# Patient Record
Sex: MALE | Race: WHITE | Employment: OTHER | URBAN - METROPOLITAN AREA
[De-identification: names, ages, dates, MRNs, and addresses within clinical notes are randomized per-mention and may not be internally consistent; named-entity substitution may affect disease eponyms.]

---

## 2018-12-14 ENCOUNTER — OFFICE VISIT (OUTPATIENT)
Dept: FAMILY MEDICINE CLINIC | Age: 53
End: 2018-12-14
Payer: COMMERCIAL

## 2018-12-14 VITALS
OXYGEN SATURATION: 97 % | HEIGHT: 72 IN | SYSTOLIC BLOOD PRESSURE: 138 MMHG | TEMPERATURE: 98.1 F | WEIGHT: 212 LBS | HEART RATE: 88 BPM | BODY MASS INDEX: 28.71 KG/M2 | DIASTOLIC BLOOD PRESSURE: 88 MMHG

## 2018-12-14 DIAGNOSIS — E34.9 HYPOTESTOSTERONISM: ICD-10-CM

## 2018-12-14 DIAGNOSIS — F33.0 MILD EPISODE OF RECURRENT MAJOR DEPRESSIVE DISORDER (HCC): ICD-10-CM

## 2018-12-14 DIAGNOSIS — G47.00 INSOMNIA, UNSPECIFIED TYPE: ICD-10-CM

## 2018-12-14 DIAGNOSIS — I10 HYPERTENSION, UNSPECIFIED TYPE: Primary | ICD-10-CM

## 2018-12-14 DIAGNOSIS — F41.9 ANXIETY: ICD-10-CM

## 2018-12-14 DIAGNOSIS — G47.33 OSA (OBSTRUCTIVE SLEEP APNEA): ICD-10-CM

## 2018-12-14 PROCEDURE — 99205 OFFICE O/P NEW HI 60 MIN: CPT | Performed by: FAMILY MEDICINE

## 2018-12-14 RX ORDER — LISINOPRIL 30 MG/1
60 TABLET ORAL DAILY
COMMUNITY
End: 2018-12-14 | Stop reason: SDUPTHER

## 2018-12-14 RX ORDER — ANASTROZOLE 1 MG/1
1 TABLET ORAL WEEKLY
Refills: 0 | COMMUNITY
Start: 2018-11-15 | End: 2018-12-21

## 2018-12-14 RX ORDER — ZOLPIDEM TARTRATE 12.5 MG/1
12.5 TABLET, FILM COATED, EXTENDED RELEASE ORAL NIGHTLY PRN
Qty: 30 TABLET | Refills: 0 | Status: SHIPPED | OUTPATIENT
Start: 2018-12-14 | End: 2019-01-13

## 2018-12-14 RX ORDER — TESTOSTERONE GEL, 1% 10 MG/G
GEL TRANSDERMAL DAILY
COMMUNITY
End: 2019-02-15 | Stop reason: SDUPTHER

## 2018-12-14 RX ORDER — FENOFIBRATE 160 MG/1
160 TABLET ORAL DAILY
Status: ON HOLD | COMMUNITY
End: 2019-06-09 | Stop reason: ALTCHOICE

## 2018-12-14 RX ORDER — LISINOPRIL 40 MG/1
40 TABLET ORAL DAILY
Qty: 30 TABLET | Refills: 2 | Status: SHIPPED | OUTPATIENT
Start: 2018-12-14 | End: 2019-01-15 | Stop reason: SDUPTHER

## 2018-12-14 RX ORDER — HYDROCHLOROTHIAZIDE 25 MG/1
25 TABLET ORAL DAILY
Qty: 30 TABLET | Refills: 2 | Status: SHIPPED | OUTPATIENT
Start: 2018-12-14 | End: 2019-01-15 | Stop reason: SDUPTHER

## 2018-12-14 RX ORDER — ZOLPIDEM TARTRATE 10 MG/1
TABLET ORAL
Refills: 0 | COMMUNITY
Start: 2018-10-21 | End: 2018-12-14 | Stop reason: ALTCHOICE

## 2018-12-14 ASSESSMENT — PATIENT HEALTH QUESTIONNAIRE - PHQ9
2. FEELING DOWN, DEPRESSED OR HOPELESS: 2
SUM OF ALL RESPONSES TO PHQ9 QUESTIONS 1 & 2: 2
SUM OF ALL RESPONSES TO PHQ QUESTIONS 1-9: 2
1. LITTLE INTEREST OR PLEASURE IN DOING THINGS: 0
SUM OF ALL RESPONSES TO PHQ QUESTIONS 1-9: 2

## 2018-12-14 NOTE — PROGRESS NOTES
[facility]  Office Progress Note - Dr. Girish Carr  12/14/18    Chief Complaint   Patient presents with    New Patient        S: Establishing    Patient presents with many concerns today. He reports that he is generally felt unwell on his current medication regimens. He has been struggling with insomnia, anxiety and depression, low testosterone, and hypertension. Insomnia  Chronic problem. He has tried many different medications. He is currently taking Ambien 10 mg nightly, Serzone 100 mg twice a day, and Klonopin 1 mg once to twice daily. He has a history of sleep apnea test about 3 years ago. He has not been able to tolerate a CPAP machine, though he tried many different times. He finds that he has difficulty sleeping at home at nighttime, but notes that after his family leaves for the day, he usually is able to sleep for 3-4 hours. He finds himself waking frequently after initially falling to sleep with use of the medication. Obstructive sleep apnea  As above, but  Dx with sleep apnea about 3 years ago. Tried CPAP machine and just wasn't able to adjust to it. He did attempt to lose some weight. Was about 240 pounds, and now 212 today. Walking 2-3 miles 6 days weekly. Had irregular heartbeat - PVCs. Saw cardio. Had Cath.stress test, All good. He hasn't been retested since his weight loss. Hypertension  BP has not been well controlled. Often sees 160s-170s / 90s - 100s/. Worsens throughout the day   Has tried arbs, beta blocker and calcium channel blockers. Had sexual side effects from some meds. This is important to him to not have the side effects. He occasionally thinks he has anginal symptoms. He currently takes 60 mg of lisinopril daily. We discussed that this is an unusual dose. He has never had a workup for renal artery stenosis. Question if other causes of secondary hypertension have been worked up.   He is not able to tolerate treatment for obstructive

## 2018-12-21 ENCOUNTER — TELEPHONE (OUTPATIENT)
Dept: FAMILY MEDICINE CLINIC | Age: 53
End: 2018-12-21

## 2018-12-21 DIAGNOSIS — F41.9 ANXIETY: ICD-10-CM

## 2018-12-21 DIAGNOSIS — E34.9 HYPOTESTOSTERONISM: ICD-10-CM

## 2018-12-21 DIAGNOSIS — I10 HYPERTENSION, UNSPECIFIED TYPE: ICD-10-CM

## 2018-12-27 ENCOUNTER — HOSPITAL ENCOUNTER (OUTPATIENT)
Dept: ULTRASOUND IMAGING | Age: 53
Discharge: HOME | End: 2018-12-29
Payer: COMMERCIAL

## 2018-12-27 DIAGNOSIS — I10 HYPERTENSION, ESSENTIAL: ICD-10-CM

## 2018-12-27 DIAGNOSIS — I10 HYPERTENSION, UNSPECIFIED TYPE: ICD-10-CM

## 2018-12-27 PROCEDURE — 93975 VASCULAR STUDY: CPT

## 2018-12-27 PROCEDURE — 76770 US EXAM ABDO BACK WALL COMP: CPT

## 2019-01-15 ENCOUNTER — OFFICE VISIT (OUTPATIENT)
Dept: FAMILY MEDICINE CLINIC | Age: 54
End: 2019-01-15
Payer: COMMERCIAL

## 2019-01-15 VITALS
OXYGEN SATURATION: 97 % | SYSTOLIC BLOOD PRESSURE: 120 MMHG | DIASTOLIC BLOOD PRESSURE: 76 MMHG | TEMPERATURE: 98.3 F | WEIGHT: 215 LBS | HEIGHT: 72 IN | BODY MASS INDEX: 29.12 KG/M2 | HEART RATE: 99 BPM

## 2019-01-15 DIAGNOSIS — I10 HYPERTENSION, UNSPECIFIED TYPE: ICD-10-CM

## 2019-01-15 DIAGNOSIS — F32.A ANXIETY AND DEPRESSION: ICD-10-CM

## 2019-01-15 DIAGNOSIS — F41.9 ANXIETY AND DEPRESSION: ICD-10-CM

## 2019-01-15 DIAGNOSIS — F51.01 PRIMARY INSOMNIA: ICD-10-CM

## 2019-01-15 DIAGNOSIS — I10 ESSENTIAL HYPERTENSION: ICD-10-CM

## 2019-01-15 DIAGNOSIS — E34.9 HYPOTESTOSTERONEMIA: Primary | ICD-10-CM

## 2019-01-15 PROCEDURE — 99214 OFFICE O/P EST MOD 30 MIN: CPT | Performed by: FAMILY MEDICINE

## 2019-01-15 RX ORDER — LISINOPRIL 40 MG/1
40 TABLET ORAL DAILY
Qty: 90 TABLET | Refills: 1 | Status: SHIPPED | OUTPATIENT
Start: 2019-01-15 | End: 2019-10-22 | Stop reason: SDUPTHER

## 2019-01-15 RX ORDER — HYDROCHLOROTHIAZIDE 25 MG/1
25 TABLET ORAL DAILY
Qty: 90 TABLET | Refills: 1 | Status: SHIPPED | OUTPATIENT
Start: 2019-01-15 | End: 2019-05-21

## 2019-01-25 ENCOUNTER — NURSE ONLY (OUTPATIENT)
Dept: FAMILY MEDICINE CLINIC | Age: 54
End: 2019-01-25
Payer: COMMERCIAL

## 2019-01-25 ENCOUNTER — HOSPITAL ENCOUNTER (OUTPATIENT)
Age: 54
Discharge: HOME | End: 2019-01-27
Payer: COMMERCIAL

## 2019-01-25 DIAGNOSIS — E34.9 HYPOTESTOSTERONEMIA: Primary | ICD-10-CM

## 2019-01-25 DIAGNOSIS — I10 ESSENTIAL HYPERTENSION: ICD-10-CM

## 2019-01-25 DIAGNOSIS — E34.9 HYPOTESTOSTERONEMIA: ICD-10-CM

## 2019-01-25 LAB
ALBUMIN SERPL-MCNC: 4.5 G/DL (ref 3.5–5.2)
ALP BLD-CCNC: 37 U/L (ref 40–129)
ALT SERPL-CCNC: 19 U/L (ref 0–40)
ANION GAP SERPL CALCULATED.3IONS-SCNC: 13 MMOL/L (ref 7–16)
AST SERPL-CCNC: 14 U/L (ref 0–39)
BILIRUB SERPL-MCNC: 0.2 MG/DL (ref 0–1.2)
BUN BLDV-MCNC: 17 MG/DL (ref 6–20)
CALCIUM SERPL-MCNC: 9.1 MG/DL (ref 8.6–10.2)
CHLORIDE BLD-SCNC: 102 MMOL/L (ref 98–107)
CO2: 29 MMOL/L (ref 22–29)
CREAT SERPL-MCNC: 1 MG/DL (ref 0.7–1.2)
FOLLICLE STIMULATING HORMONE: <0.1 MIU/ML
GFR AFRICAN AMERICAN: >60
GFR NON-AFRICAN AMERICAN: >60 ML/MIN/1.73
GLUCOSE BLD-MCNC: 93 MG/DL (ref 74–99)
LUTEINIZING HORMONE: <0.1 MIU/ML
POTASSIUM SERPL-SCNC: 4.1 MMOL/L (ref 3.5–5)
SODIUM BLD-SCNC: 144 MMOL/L (ref 132–146)
TOTAL PROTEIN: 7.2 G/DL (ref 6.4–8.3)

## 2019-01-25 PROCEDURE — 83001 ASSAY OF GONADOTROPIN (FSH): CPT

## 2019-01-25 PROCEDURE — 80053 COMPREHEN METABOLIC PANEL: CPT

## 2019-01-25 PROCEDURE — 83002 ASSAY OF GONADOTROPIN (LH): CPT

## 2019-01-25 PROCEDURE — 36415 COLL VENOUS BLD VENIPUNCTURE: CPT | Performed by: FAMILY MEDICINE

## 2019-01-25 PROCEDURE — 84403 ASSAY OF TOTAL TESTOSTERONE: CPT

## 2019-01-25 PROCEDURE — 84270 ASSAY OF SEX HORMONE GLOBUL: CPT

## 2019-01-29 LAB
SEX HORMONE BINDING GLOBULIN: 19 NMOL/L (ref 11–80)
TESTOSTERONE FREE-NONMALE: 59.2 PG/ML (ref 47–244)
TESTOSTERONE TOTAL: 232 NG/DL (ref 220–1000)

## 2019-02-15 ENCOUNTER — OFFICE VISIT (OUTPATIENT)
Dept: FAMILY MEDICINE CLINIC | Age: 54
End: 2019-02-15
Payer: COMMERCIAL

## 2019-02-15 VITALS
SYSTOLIC BLOOD PRESSURE: 120 MMHG | BODY MASS INDEX: 29.57 KG/M2 | DIASTOLIC BLOOD PRESSURE: 80 MMHG | HEART RATE: 72 BPM | TEMPERATURE: 97.5 F | WEIGHT: 218 LBS | OXYGEN SATURATION: 97 %

## 2019-02-15 DIAGNOSIS — F41.9 ANXIETY: ICD-10-CM

## 2019-02-15 DIAGNOSIS — E34.9 HYPOTESTOSTERONISM: ICD-10-CM

## 2019-02-15 DIAGNOSIS — F33.1 MODERATE EPISODE OF RECURRENT MAJOR DEPRESSIVE DISORDER (HCC): ICD-10-CM

## 2019-02-15 DIAGNOSIS — F51.01 PRIMARY INSOMNIA: Primary | ICD-10-CM

## 2019-02-15 PROCEDURE — 99214 OFFICE O/P EST MOD 30 MIN: CPT | Performed by: FAMILY MEDICINE

## 2019-02-15 RX ORDER — TESTOSTERONE GEL, 1% 10 MG/G
GEL TRANSDERMAL
Qty: 120 TUBE | Refills: 0 | Status: SHIPPED | OUTPATIENT
Start: 2019-02-15 | End: 2019-04-15 | Stop reason: SDUPTHER

## 2019-02-15 ASSESSMENT — PATIENT HEALTH QUESTIONNAIRE - PHQ9
1. LITTLE INTEREST OR PLEASURE IN DOING THINGS: 0
SUM OF ALL RESPONSES TO PHQ QUESTIONS 1-9: 1
SUM OF ALL RESPONSES TO PHQ QUESTIONS 1-9: 1
SUM OF ALL RESPONSES TO PHQ9 QUESTIONS 1 & 2: 1
2. FEELING DOWN, DEPRESSED OR HOPELESS: 1

## 2019-02-22 ENCOUNTER — TELEPHONE (OUTPATIENT)
Dept: FAMILY MEDICINE CLINIC | Age: 54
End: 2019-02-22

## 2019-03-21 ENCOUNTER — OFFICE VISIT (OUTPATIENT)
Dept: ENDOCRINOLOGY | Age: 54
End: 2019-03-21
Payer: COMMERCIAL

## 2019-03-21 VITALS
WEIGHT: 219.8 LBS | BODY MASS INDEX: 29.77 KG/M2 | SYSTOLIC BLOOD PRESSURE: 122 MMHG | HEIGHT: 72 IN | OXYGEN SATURATION: 98 % | HEART RATE: 85 BPM | RESPIRATION RATE: 16 BRPM | DIASTOLIC BLOOD PRESSURE: 70 MMHG

## 2019-03-21 DIAGNOSIS — E66.3 OVERWEIGHT: ICD-10-CM

## 2019-03-21 DIAGNOSIS — E23.0 HYPOGONADOTROPIC HYPOGONADISM (HCC): Primary | ICD-10-CM

## 2019-03-21 DIAGNOSIS — G47.00 INSOMNIA, UNSPECIFIED TYPE: ICD-10-CM

## 2019-03-21 PROCEDURE — 99204 OFFICE O/P NEW MOD 45 MIN: CPT | Performed by: INTERNAL MEDICINE

## 2019-03-25 ENCOUNTER — TELEPHONE (OUTPATIENT)
Dept: FAMILY MEDICINE CLINIC | Age: 54
End: 2019-03-25

## 2019-03-25 ENCOUNTER — OFFICE VISIT (OUTPATIENT)
Dept: FAMILY MEDICINE CLINIC | Age: 54
End: 2019-03-25
Payer: COMMERCIAL

## 2019-03-25 VITALS
SYSTOLIC BLOOD PRESSURE: 118 MMHG | OXYGEN SATURATION: 94 % | TEMPERATURE: 97.7 F | HEART RATE: 78 BPM | DIASTOLIC BLOOD PRESSURE: 62 MMHG | BODY MASS INDEX: 29.7 KG/M2 | WEIGHT: 219 LBS

## 2019-03-25 DIAGNOSIS — F51.01 PRIMARY INSOMNIA: Primary | ICD-10-CM

## 2019-03-25 DIAGNOSIS — F33.1 MODERATE EPISODE OF RECURRENT MAJOR DEPRESSIVE DISORDER (HCC): ICD-10-CM

## 2019-03-25 PROCEDURE — 99213 OFFICE O/P EST LOW 20 MIN: CPT | Performed by: FAMILY MEDICINE

## 2019-03-25 RX ORDER — CLONAZEPAM 1 MG/1
1 TABLET ORAL NIGHTLY PRN
Qty: 30 TABLET | Refills: 0 | Status: SHIPPED | OUTPATIENT
Start: 2019-03-25 | End: 2019-03-25 | Stop reason: SDUPTHER

## 2019-03-25 RX ORDER — CLONAZEPAM 1 MG/1
1 TABLET ORAL NIGHTLY PRN
Qty: 30 TABLET | Refills: 0 | Status: SHIPPED | OUTPATIENT
Start: 2019-03-25 | End: 2019-04-19 | Stop reason: SDUPTHER

## 2019-03-25 RX ORDER — BUPROPION HYDROCHLORIDE 150 MG/1
150 TABLET ORAL EVERY MORNING
Qty: 30 TABLET | Refills: 1 | Status: SHIPPED | OUTPATIENT
Start: 2019-03-25 | End: 2019-05-13 | Stop reason: SDUPTHER

## 2019-04-08 DIAGNOSIS — F51.01 PRIMARY INSOMNIA: ICD-10-CM

## 2019-04-08 RX ORDER — CLONAZEPAM 1 MG/1
1 TABLET ORAL NIGHTLY PRN
Qty: 30 TABLET | Refills: 0 | OUTPATIENT
Start: 2019-04-08 | End: 2019-05-08

## 2019-04-15 DIAGNOSIS — E34.9 HYPOTESTOSTERONISM: ICD-10-CM

## 2019-04-15 RX ORDER — TESTOSTERONE GEL, 1% 10 MG/G
GEL TRANSDERMAL
Qty: 600 G | Refills: 2 | Status: SHIPPED | OUTPATIENT
Start: 2019-04-15 | End: 2019-10-22 | Stop reason: SDUPTHER

## 2019-04-19 DIAGNOSIS — F51.01 PRIMARY INSOMNIA: ICD-10-CM

## 2019-04-23 RX ORDER — CLONAZEPAM 1 MG/1
1 TABLET ORAL NIGHTLY PRN
Qty: 30 TABLET | Refills: 0 | Status: SHIPPED | OUTPATIENT
Start: 2019-04-23 | End: 2019-05-13 | Stop reason: ALTCHOICE

## 2019-05-13 ENCOUNTER — OFFICE VISIT (OUTPATIENT)
Dept: FAMILY MEDICINE CLINIC | Age: 54
End: 2019-05-13
Payer: COMMERCIAL

## 2019-05-13 ENCOUNTER — HOSPITAL ENCOUNTER (OUTPATIENT)
Age: 54
Discharge: HOME | End: 2019-05-15
Payer: COMMERCIAL

## 2019-05-13 VITALS
DIASTOLIC BLOOD PRESSURE: 98 MMHG | HEIGHT: 72 IN | BODY MASS INDEX: 28.71 KG/M2 | OXYGEN SATURATION: 97 % | HEART RATE: 74 BPM | SYSTOLIC BLOOD PRESSURE: 140 MMHG | WEIGHT: 212 LBS | TEMPERATURE: 98.5 F

## 2019-05-13 DIAGNOSIS — F33.1 MODERATE EPISODE OF RECURRENT MAJOR DEPRESSIVE DISORDER (HCC): ICD-10-CM

## 2019-05-13 DIAGNOSIS — R10.30 LOWER ABDOMINAL PAIN: ICD-10-CM

## 2019-05-13 DIAGNOSIS — F51.01 PRIMARY INSOMNIA: Primary | ICD-10-CM

## 2019-05-13 DIAGNOSIS — I10 ESSENTIAL HYPERTENSION: ICD-10-CM

## 2019-05-13 PROCEDURE — 83735 ASSAY OF MAGNESIUM: CPT

## 2019-05-13 PROCEDURE — 80053 COMPREHEN METABOLIC PANEL: CPT

## 2019-05-13 PROCEDURE — 99214 OFFICE O/P EST MOD 30 MIN: CPT | Performed by: FAMILY MEDICINE

## 2019-05-13 RX ORDER — BUPROPION HYDROCHLORIDE 150 MG/1
150 TABLET ORAL EVERY MORNING
Qty: 90 TABLET | Refills: 1 | Status: SHIPPED | OUTPATIENT
Start: 2019-05-13 | End: 2019-10-22 | Stop reason: SDUPTHER

## 2019-05-13 RX ORDER — CLONAZEPAM 0.5 MG/1
0.75 TABLET ORAL NIGHTLY PRN
Qty: 45 TABLET | Refills: 0 | Status: SHIPPED | OUTPATIENT
Start: 2019-05-13 | End: 2019-06-11

## 2019-05-13 NOTE — PROGRESS NOTES
headaches, +cramping  No tingling, No numbness, No weakness,   +bowel changes, No hematochezia, No melena,  No bladder changes, No hematuria  No skin rashes, No skin lesions. No vision changes, No hearing changes,   No polyuria, polydipsia, polyphagia. Improved mood. ROS otherwise negative unless as listed in HPI. Chart reviewed and updated where appropriate for PMH, Fam, and Soc Hx. Physical Exam   BP (!) 140/98 (Site: Right Upper Arm, Position: Sitting, Cuff Size: Large Adult)   Pulse 74   Temp 98.5 °F (36.9 °C) (Oral)   Ht 6' (1.829 m)   Wt 212 lb (96.2 kg)   SpO2 97%   BMI 28.75 kg/m²   Wt Readings from Last 3 Encounters:   05/13/19 212 lb (96.2 kg)   03/25/19 219 lb (99.3 kg)   03/21/19 219 lb 12.8 oz (99.7 kg)       Constitutional:    He is oriented to person, place, and time. He appears well-developed and well-nourished. HENT:    Nose: Nose normal.    Mouth/Throat: Oropharynx is clear and moist.   Eyes:    Conjunctivae are normal.    Pupils are equal, round, and reactive to light. EOMI. Neck:    Normal range of motion. No thyromegaly or nodules noted. No bruit. Cardiovascular:    Normal rate, regular rhythm and normal heart sounds. No murmur. No gallop and no friction rub. Pulmonary/Chest:    Effort normal and breath sounds normal.    No wheezes. No rales or rhonchi. Abdominal:    Soft. Bowel sounds are normal.    No distension. No tenderness. Musculoskeletal:    Normal range of motion. No joint swelling noted. No peripheral edema. Skin:    Skin is warm and dry. No rashes, lesions. Psychiatric:    He has a normal mood and affect. Normal groom and dress.     Current Outpatient Medications on File Prior to Visit   Medication Sig Dispense Refill    testosterone (ANDROGEL; TESTIM) 50 MG/5GM (1%) GEL 1% gel APPLY THE CONTENTS OF 2 TUBES ON MONDAY, WEDNESDAY, AND FRIDAY AND APPLY THE CONTENTS OF 1 TUBE ON THE OTHER DAYS OF THE WEEK 600 g 2    nefazodone (SERZONE) 100 MG tablet Take 2 tablets by mouth in the evening and one tablet by mouth in the morning. 270 tablet 0    lisinopril (PRINIVIL;ZESTRIL) 40 MG tablet Take 1 tablet by mouth daily 90 tablet 1    hydrochlorothiazide (HYDRODIURIL) 25 MG tablet Take 1 tablet by mouth daily 90 tablet 1    fenofibrate 160 MG tablet Take 160 mg by mouth daily       No current facility-administered medications on file prior to visit. Patient Active Problem List   Diagnosis Code    Moderate episode of recurrent major depressive disorder ([facility] [address] F33.1    Primary insomnia F51.01    Anxiety F41.9    Hypogonadotropic hypogonadism ([facility] [address] E23.0    Hypertriglyceridemia E78.1    HTN (hypertension) I10    ZIA (obstructive sleep apnea) G47.33    Overweight E66.3    Insomnia G47.00       Assessment / Ruiz Terry was seen today for depression. Diagnoses and all orders for this visit:    Primary insomnia, improving with mood improvement  -   Continue to wean:  clonazePAM (KLONOPIN) 0.5 MG tablet; Take 1.5 tablets by mouth nightly as needed (sleep) for up to 30 days. 0.5mg nightly next month. Moderate episode of recurrent major depressive disorder (HCC), improving  -  Continue   buPROPion (WELLBUTRIN XL) 150 MG extended release tablet; Take 1 tablet by mouth every morning    Essential hypertension, elevated today  -     Comprehensive Metabolic Panel; Future  -     MAGNESIUM; Future  Continue current BP med regimen. Intensify or alter next month if still elevated. He thinks potassium supplement seems to improve symptoms. K+ ok on 2 most recent checks. If wanted to try 10mEq dose and see if feel improvement, I wouldn't be opposed, but would want to recheck K+    Abdominal Pain / cramping  Due for colonoscopy follow up after obtaining and reviewing last Cscope report. Will refer. He relates the symptom to HCTZ use, will explore further with him. May try K+ daily prescribed. No red flag symptoms.      Return in about 1 month (around 6/13/2019). Patient counseled to follow up sooner or seek more acute care if symptoms worsening. Electronically signed by Delilah Elias MD on 5/14/2019    This note may have been created using dictation software.  Efforts were made to reduce grammatical or syntax errors, but some may persist.

## 2019-05-14 DIAGNOSIS — I10 ESSENTIAL HYPERTENSION: ICD-10-CM

## 2019-05-14 LAB
ALBUMIN SERPL-MCNC: 4.3 G/DL (ref 3.5–5.2)
ALP BLD-CCNC: 46 U/L (ref 40–129)
ALT SERPL-CCNC: 15 U/L (ref 0–40)
ANION GAP SERPL CALCULATED.3IONS-SCNC: 8 MMOL/L (ref 7–16)
AST SERPL-CCNC: 18 U/L (ref 0–39)
BILIRUB SERPL-MCNC: <0.2 MG/DL (ref 0–1.2)
BUN BLDV-MCNC: 13 MG/DL (ref 6–20)
CALCIUM SERPL-MCNC: 9.3 MG/DL (ref 8.6–10.2)
CHLORIDE BLD-SCNC: 101 MMOL/L (ref 98–107)
CO2: 30 MMOL/L (ref 22–29)
CREAT SERPL-MCNC: 0.8 MG/DL (ref 0.7–1.2)
GFR AFRICAN AMERICAN: >60
GFR NON-AFRICAN AMERICAN: >60 ML/MIN/1.73
GLUCOSE BLD-MCNC: 88 MG/DL (ref 74–99)
MAGNESIUM: 2.3 MG/DL (ref 1.6–2.6)
POTASSIUM SERPL-SCNC: 4.4 MMOL/L (ref 3.5–5)
SODIUM BLD-SCNC: 139 MMOL/L (ref 132–146)
TOTAL PROTEIN: 7.4 G/DL (ref 6.4–8.3)

## 2019-05-20 ENCOUNTER — TELEPHONE (OUTPATIENT)
Dept: FAMILY MEDICINE CLINIC | Age: 54
End: 2019-05-20

## 2019-05-20 NOTE — TELEPHONE ENCOUNTER
Spoke with pt today and he states he stopped taking his HCTZ and is no longer experiencing the angina and fatigue. He states he is feeling much better. His BP was 140/90. Please advise.

## 2019-05-21 DIAGNOSIS — F41.9 ANXIETY: ICD-10-CM

## 2019-05-21 RX ORDER — AMLODIPINE BESYLATE 2.5 MG/1
2.5 TABLET ORAL DAILY
Qty: 30 TABLET | Refills: 0 | Status: SHIPPED | OUTPATIENT
Start: 2019-05-21 | End: 2019-06-13 | Stop reason: SDUPTHER

## 2019-06-08 ENCOUNTER — APPOINTMENT (OUTPATIENT)
Dept: CT IMAGING | Age: 54
End: 2019-06-08
Payer: COMMERCIAL

## 2019-06-08 ENCOUNTER — HOSPITAL ENCOUNTER (EMERGENCY)
Age: 54
Discharge: NURSING, REHAB OR LONG-TERM CARE | End: 2019-06-08
Attending: EMERGENCY MEDICINE
Payer: COMMERCIAL

## 2019-06-08 ENCOUNTER — HOSPITAL ENCOUNTER (OUTPATIENT)
Age: 54
Discharge: HOME | End: 2019-06-08
Payer: COMMERCIAL

## 2019-06-08 ENCOUNTER — HOSPITAL ENCOUNTER (OUTPATIENT)
Age: 54
Setting detail: OBSERVATION
Discharge: LEFT BEFORE DISCHARGE | End: 2019-06-09
Attending: INTERNAL MEDICINE | Admitting: INTERNAL MEDICINE
Payer: COMMERCIAL

## 2019-06-08 VITALS
WEIGHT: 215 LBS | HEART RATE: 70 BPM | RESPIRATION RATE: 16 BRPM | TEMPERATURE: 98.4 F | OXYGEN SATURATION: 96 % | BODY MASS INDEX: 29.12 KG/M2 | SYSTOLIC BLOOD PRESSURE: 158 MMHG | HEIGHT: 72 IN | DIASTOLIC BLOOD PRESSURE: 100 MMHG

## 2019-06-08 DIAGNOSIS — R10.9 ABDOMINAL PAIN, UNSPECIFIED ABDOMINAL LOCATION: ICD-10-CM

## 2019-06-08 DIAGNOSIS — G45.9 TIA (TRANSIENT ISCHEMIC ATTACK): Primary | ICD-10-CM

## 2019-06-08 LAB
ALBUMIN SERPL-MCNC: 4.4 G/DL (ref 3.5–5.2)
ALP BLD-CCNC: 47 U/L (ref 40–129)
ALT SERPL-CCNC: 17 U/L (ref 0–40)
ANION GAP SERPL CALCULATED.3IONS-SCNC: 14 MMOL/L (ref 7–16)
AST SERPL-CCNC: 14 U/L (ref 0–39)
BASOPHILS ABSOLUTE: 0.08 E9/L (ref 0–0.2)
BASOPHILS RELATIVE PERCENT: 1.1 % (ref 0–2)
BILIRUB SERPL-MCNC: 0.5 MG/DL (ref 0–1.2)
BILIRUBIN URINE: NEGATIVE
BLOOD, URINE: NEGATIVE
BUN BLDV-MCNC: 17 MG/DL (ref 6–20)
CALCIUM SERPL-MCNC: 10.1 MG/DL (ref 8.6–10.2)
CHLORIDE BLD-SCNC: 99 MMOL/L (ref 98–107)
CLARITY: CLEAR
CO2: 24 MMOL/L (ref 22–29)
COLOR: YELLOW
CREAT SERPL-MCNC: 0.8 MG/DL (ref 0.7–1.2)
EOSINOPHILS ABSOLUTE: 0.22 E9/L (ref 0.05–0.5)
EOSINOPHILS RELATIVE PERCENT: 3 % (ref 0–6)
GFR AFRICAN AMERICAN: >60
GFR NON-AFRICAN AMERICAN: >60 ML/MIN/1.73
GLUCOSE BLD-MCNC: 104 MG/DL (ref 74–99)
GLUCOSE URINE: NEGATIVE MG/DL
HCT VFR BLD CALC: 46.8 % (ref 37–54)
HEMOGLOBIN: 15.7 G/DL (ref 12.5–16.5)
IMMATURE GRANULOCYTES #: 0.02 E9/L
IMMATURE GRANULOCYTES %: 0.3 % (ref 0–5)
KETONES, URINE: ABNORMAL MG/DL
LACTIC ACID: 1.2 MMOL/L (ref 0.5–2.2)
LEUKOCYTE ESTERASE, URINE: NEGATIVE
LIPASE: 28 U/L (ref 13–60)
LYMPHOCYTES ABSOLUTE: 2.03 E9/L (ref 1.5–4)
LYMPHOCYTES RELATIVE PERCENT: 27.2 % (ref 20–42)
MCH RBC QN AUTO: 31 PG (ref 26–35)
MCHC RBC AUTO-ENTMCNC: 33.5 % (ref 32–34.5)
MCV RBC AUTO: 92.5 FL (ref 80–99.9)
MONOCYTES ABSOLUTE: 0.57 E9/L (ref 0.1–0.95)
MONOCYTES RELATIVE PERCENT: 7.7 % (ref 2–12)
NEUTROPHILS ABSOLUTE: 4.53 E9/L (ref 1.8–7.3)
NEUTROPHILS RELATIVE PERCENT: 60.7 % (ref 43–80)
NITRITE, URINE: NEGATIVE
PDW BLD-RTO: 13.3 FL (ref 11.5–15)
PH UA: 8.5 (ref 5–9)
PLATELET # BLD: 244 E9/L (ref 130–450)
PMV BLD AUTO: 9.7 FL (ref 7–12)
POTASSIUM SERPL-SCNC: 3.6 MMOL/L (ref 3.5–5)
PROTEIN UA: NEGATIVE MG/DL
RBC # BLD: 5.06 E12/L (ref 3.8–5.8)
SODIUM BLD-SCNC: 137 MMOL/L (ref 132–146)
SPECIFIC GRAVITY UA: 1.02 (ref 1–1.03)
TOTAL PROTEIN: 7.1 G/DL (ref 6.4–8.3)
TROPONIN: <0.01 NG/ML (ref 0–0.03)
TROPONIN: <0.01 NG/ML (ref 0–0.03)
UROBILINOGEN, URINE: 0.2 E.U./DL
WBC # BLD: 7.5 E9/L (ref 4.5–11.5)

## 2019-06-08 PROCEDURE — 84484 ASSAY OF TROPONIN QUANT: CPT

## 2019-06-08 PROCEDURE — 2580000003 HC RX 258: Performed by: INTERNAL MEDICINE

## 2019-06-08 PROCEDURE — 6360000002 HC RX W HCPCS: Performed by: STUDENT IN AN ORGANIZED HEALTH CARE EDUCATION/TRAINING PROGRAM

## 2019-06-08 PROCEDURE — A0425 GROUND MILEAGE: HCPCS

## 2019-06-08 PROCEDURE — 36415 COLL VENOUS BLD VENIPUNCTURE: CPT

## 2019-06-08 PROCEDURE — 99285 EMERGENCY DEPT VISIT HI MDM: CPT

## 2019-06-08 PROCEDURE — G0378 HOSPITAL OBSERVATION PER HR: HCPCS

## 2019-06-08 PROCEDURE — 70450 CT HEAD/BRAIN W/O DYE: CPT

## 2019-06-08 PROCEDURE — 2580000003 HC RX 258: Performed by: STUDENT IN AN ORGANIZED HEALTH CARE EDUCATION/TRAINING PROGRAM

## 2019-06-08 PROCEDURE — 83690 ASSAY OF LIPASE: CPT

## 2019-06-08 PROCEDURE — 96374 THER/PROPH/DIAG INJ IV PUSH: CPT

## 2019-06-08 PROCEDURE — 6370000000 HC RX 637 (ALT 250 FOR IP): Performed by: STUDENT IN AN ORGANIZED HEALTH CARE EDUCATION/TRAINING PROGRAM

## 2019-06-08 PROCEDURE — 96375 TX/PRO/DX INJ NEW DRUG ADDON: CPT

## 2019-06-08 PROCEDURE — A0426 ALS 1: HCPCS

## 2019-06-08 PROCEDURE — 85025 COMPLETE CBC W/AUTO DIFF WBC: CPT

## 2019-06-08 PROCEDURE — 81003 URINALYSIS AUTO W/O SCOPE: CPT

## 2019-06-08 PROCEDURE — 6370000000 HC RX 637 (ALT 250 FOR IP): Performed by: INTERNAL MEDICINE

## 2019-06-08 PROCEDURE — 83605 ASSAY OF LACTIC ACID: CPT

## 2019-06-08 PROCEDURE — 6360000004 HC RX CONTRAST MEDICATION: Performed by: RADIOLOGY

## 2019-06-08 PROCEDURE — 74177 CT ABD & PELVIS W/CONTRAST: CPT

## 2019-06-08 PROCEDURE — 80053 COMPREHEN METABOLIC PANEL: CPT

## 2019-06-08 PROCEDURE — 93005 ELECTROCARDIOGRAM TRACING: CPT | Performed by: EMERGENCY MEDICINE

## 2019-06-08 PROCEDURE — 2500000003 HC RX 250 WO HCPCS: Performed by: STUDENT IN AN ORGANIZED HEALTH CARE EDUCATION/TRAINING PROGRAM

## 2019-06-08 RX ORDER — ATORVASTATIN CALCIUM 40 MG/1
40 TABLET, FILM COATED ORAL NIGHTLY
Status: DISCONTINUED | OUTPATIENT
Start: 2019-06-08 | End: 2019-06-09 | Stop reason: HOSPADM

## 2019-06-08 RX ORDER — ASPIRIN 81 MG/1
81 TABLET ORAL DAILY
Status: DISCONTINUED | OUTPATIENT
Start: 2019-06-09 | End: 2019-06-09 | Stop reason: HOSPADM

## 2019-06-08 RX ORDER — 0.9 % SODIUM CHLORIDE 0.9 %
1000 INTRAVENOUS SOLUTION INTRAVENOUS ONCE
Status: COMPLETED | OUTPATIENT
Start: 2019-06-08 | End: 2019-06-08

## 2019-06-08 RX ORDER — SODIUM CHLORIDE 0.9 % (FLUSH) 0.9 %
10 SYRINGE (ML) INJECTION EVERY 12 HOURS SCHEDULED
Status: DISCONTINUED | OUTPATIENT
Start: 2019-06-08 | End: 2019-06-09 | Stop reason: HOSPADM

## 2019-06-08 RX ORDER — SODIUM CHLORIDE 0.9 % (FLUSH) 0.9 %
10 SYRINGE (ML) INJECTION PRN
Status: DISCONTINUED | OUTPATIENT
Start: 2019-06-08 | End: 2019-06-09 | Stop reason: HOSPADM

## 2019-06-08 RX ORDER — PROCHLORPERAZINE EDISYLATE 5 MG/ML
10 INJECTION INTRAMUSCULAR; INTRAVENOUS ONCE
Status: COMPLETED | OUTPATIENT
Start: 2019-06-08 | End: 2019-06-08

## 2019-06-08 RX ORDER — ONDANSETRON 2 MG/ML
4 INJECTION INTRAMUSCULAR; INTRAVENOUS EVERY 6 HOURS PRN
Status: DISCONTINUED | OUTPATIENT
Start: 2019-06-08 | End: 2019-06-09 | Stop reason: HOSPADM

## 2019-06-08 RX ADMIN — ATORVASTATIN CALCIUM 40 MG: 40 TABLET, FILM COATED ORAL at 23:31

## 2019-06-08 RX ADMIN — SODIUM CHLORIDE 1000 ML: 9 INJECTION, SOLUTION INTRAVENOUS at 17:36

## 2019-06-08 RX ADMIN — Medication 10 ML: at 23:31

## 2019-06-08 RX ADMIN — FAMOTIDINE 20 MG: 10 INJECTION, SOLUTION INTRAVENOUS at 18:57

## 2019-06-08 RX ADMIN — LIDOCAINE HYDROCHLORIDE: 20 SOLUTION ORAL; TOPICAL at 18:57

## 2019-06-08 RX ADMIN — PROCHLORPERAZINE EDISYLATE 10 MG: 5 INJECTION INTRAMUSCULAR; INTRAVENOUS at 17:36

## 2019-06-08 RX ADMIN — IOPAMIDOL 110 ML: 755 INJECTION, SOLUTION INTRAVENOUS at 18:24

## 2019-06-08 ASSESSMENT — ENCOUNTER SYMPTOMS
SHORTNESS OF BREATH: 0
BACK PAIN: 0
WHEEZING: 0
VOMITING: 0
COUGH: 0
ABDOMINAL PAIN: 1
DIARRHEA: 0
CONSTIPATION: 0
NAUSEA: 1
RHINORRHEA: 0
BLOOD IN STOOL: 0
CHEST TIGHTNESS: 0
SORE THROAT: 0

## 2019-06-08 ASSESSMENT — PAIN DESCRIPTION - DESCRIPTORS: DESCRIPTORS: ACHING

## 2019-06-08 ASSESSMENT — PAIN SCALES - GENERAL
PAINLEVEL_OUTOF10: 0
PAINLEVEL_OUTOF10: 4

## 2019-06-08 ASSESSMENT — PAIN DESCRIPTION - ORIENTATION: ORIENTATION: UPPER

## 2019-06-08 ASSESSMENT — PAIN DESCRIPTION - PROGRESSION: CLINICAL_PROGRESSION: GRADUALLY WORSENING

## 2019-06-08 ASSESSMENT — PAIN DESCRIPTION - PAIN TYPE: TYPE: ACUTE PAIN

## 2019-06-08 ASSESSMENT — PAIN DESCRIPTION - LOCATION: LOCATION: ABDOMEN

## 2019-06-08 ASSESSMENT — PAIN DESCRIPTION - ONSET: ONSET: GRADUAL

## 2019-06-08 ASSESSMENT — PAIN DESCRIPTION - FREQUENCY: FREQUENCY: CONTINUOUS

## 2019-06-08 NOTE — ED PROVIDER NOTES
Patient is 82-year-old male with past medical history of hypertension who presents emergency department for abdominal pain. Patient states that he's had this abdominal pain for a few months. Stated that it makes it difficult for him to feel comfortable eating. States that the pain starts in the epigastric region and then will move up into his mid chest. States that earlier today  to him feeling like he was given have near-syncope event. States he did not pass out or hit his head at all. Just felt very weak all of a sudden. Patient also states that he was having some slurred speech at that time. Presentation emergency department he has 90 symptoms apart from the abdominal pain. Denies any nausea/vomiting, fevers, chest pain, shortness of breath. States that he has seen his primary care physician for this concern and they said lets follow-up in a few weeks and checked again if you're still having it. He is taking no medications to address his abdominal pain. Denies any hematemesis. Also denies any dark stool. Review of Systems   Constitutional: Negative for diaphoresis and fever. HENT: Negative for congestion, rhinorrhea and sore throat. Eyes: Negative for visual disturbance. Respiratory: Negative for cough, chest tightness, shortness of breath and wheezing. Cardiovascular: Negative for chest pain, palpitations and leg swelling. Gastrointestinal: Positive for abdominal pain and nausea. Negative for blood in stool, constipation, diarrhea and vomiting. Endocrine: Negative for polyuria. Genitourinary: Negative for decreased urine volume, discharge and dysuria. Musculoskeletal: Negative for back pain, neck pain and neck stiffness. Skin: Negative for rash. Neurological: Positive for light-headedness. Negative for syncope, weakness and headaches. Hematological: Negative for adenopathy. Psychiatric/Behavioral: Negative for confusion.        Physical Exam   Constitutional: He is limb holds 90 (or 45) degrees for full 10 seconds   5B: Test Right Arm Motor Drift 0 - no drift, limb holds 90 (or 45) degrees for full 10 seconds   6A: Test Left Leg Motor Drift 0 - no drift; leg holds 30 degree position for full 5 seconds   6B: Test Right Leg Motor Drift 0 - no drift; leg holds 30 degree position for full 5 seconds   7: Test Limb Ataxia   (FNF/Heel-Shin) 0 - absent   8: Test Sensation 0 - normal; no sensory loss   9: Test Language/Aphasia 0 - no aphasia, normal   10: Test Dysarthria 0 - normal   11: Test Extinction/Inattention 0 - no abnormality   Total NIH Stroke Score: 0     tPA Criteria*  Inclusion criteria:  - Ischemic stroke onset within 3 hours of drug administration  - Age 25 or older  - No hemorrhage or non-stroke cause of deficit on CT  - Measurable deficit on NIH Stroke Scale    Exclusion criteria: If the patient. ...  - has minor or improving symptoms  - had seizure at onset of stroke  - has had another stroke or serious head trauma within the last 3 months  - has had major surgery within the last 14 days  - has known history of intracranial hemorrhage  - has sustained systolic blood pressure >608 mmHg  - has sustained diastolic blood pressure >430 mmHg  - requires aggressive treatment is necessary to lower their blood pressure  - has symptoms suggestive of subarachnoid hemorrhage  - has had GI or urinary tract hemorrhage within the last 21 days  - has had an arterial puncture at a non-compressible site within the last 7 days  - received heparin within the last 48 hours and has an elevated PTT  - has a prothrombin time (PT) >15 seconds  - has a platelet count <153,492 uL  - serum blood glucose is <50 mg/dL or >400 mg/dL    Relative Contraindications:  - NIH Stroke score >22  - Patient's CT shows evidence of large MCA territory infarction (>1/3 the MCA territory)    *[facility] Policy Paper      Acute CVA Core Measures:       --------------------------------------------- PAST HISTORY ---------------------------------------------  Past Medical History:  has a past medical history of Anxiety, Depression, HTN (hypertension), Hypertriglyceridemia, Hypogonadotropic hypogonadism ([address] Insomnia, ZIA (obstructive sleep apnea), and Overweight. Past Surgical History:  has a past surgical history that includes Colonoscopy (04/05/2012) and Foot surgery. Social History:  reports that he quit smoking about 29 years ago. His smoking use included cigarettes. He has a 8.00 pack-year smoking history. He has never used smokeless tobacco. He reports that he drinks alcohol. He reports that he does not use drugs. Family History: family history includes ADHD in his daughter; Alcohol Abuse in his father; Depression in his daughter; No Known Problems in his mother; Other in his father. The patients home medications have been reviewed.     Allergies: Codeine    -------------------------------------------------- RESULTS -------------------------------------------------    Lab  Results for orders placed or performed during the hospital encounter of 06/08/19   CBC Auto Differential   Result Value Ref Range    WBC 7.5 4.5 - 11.5 E9/L    RBC 5.06 3.80 - 5.80 E12/L    Hemoglobin 15.7 12.5 - 16.5 g/dL    Hematocrit 46.8 37.0 - 54.0 %    MCV 92.5 80.0 - 99.9 fL    MCH 31.0 26.0 - 35.0 pg    MCHC 33.5 32.0 - 34.5 %    RDW 13.3 11.5 - 15.0 fL    Platelets 313 008 - 592 E9/L    MPV 9.7 7.0 - 12.0 fL    Neutrophils % 60.7 43.0 - 80.0 %    Immature Granulocytes % 0.3 0.0 - 5.0 %    Lymphocytes % 27.2 20.0 - 42.0 %    Monocytes % 7.7 2.0 - 12.0 %    Eosinophils % 3.0 0.0 - 6.0 %    Basophils % 1.1 0.0 - 2.0 %    Neutrophils # 4.53 1.80 - 7.30 E9/L    Immature Granulocytes # 0.02 E9/L    Lymphocytes # 2.03 1.50 - 4.00 E9/L    Monocytes # 0.57 0.10 - 0.95 E9/L    Eosinophils # 0.22 0.05 - 0.50 E9/L    Basophils # 0.08 0.00 - 0.20 E9/L   Comprehensive Metabolic Panel   Result Value Ref Range    Sodium 137 132 - 146 mmol/L Potassium 3.6 3.5 - 5.0 mmol/L    Chloride 99 98 - 107 mmol/L    CO2 24 22 - 29 mmol/L    Anion Gap 14 7 - 16 mmol/L    Glucose 104 (H) 74 - 99 mg/dL    BUN 17 6 - 20 mg/dL    CREATININE 0.8 0.7 - 1.2 mg/dL    GFR Non-African American >60 >=60 mL/min/1.73    GFR African American >60     Calcium 10.1 8.6 - 10.2 mg/dL    Total Protein 7.1 6.4 - 8.3 g/dL    Alb 4.4 3.5 - 5.2 g/dL    Total Bilirubin 0.5 0.0 - 1.2 mg/dL    Alkaline Phosphatase 47 40 - 129 U/L    ALT 17 0 - 40 U/L    AST 14 0 - 39 U/L   Lactic Acid, Plasma   Result Value Ref Range    Lactic Acid 1.2 0.5 - 2.2 mmol/L   Lipase   Result Value Ref Range    Lipase 28 13 - 60 U/L   Urinalysis   Result Value Ref Range    Color, UA Yellow Straw/Yellow    Clarity, UA Clear Clear    Glucose, Ur Negative Negative mg/dL    Bilirubin Urine Negative Negative    Ketones, Urine TRACE (A) Negative mg/dL    Specific Gravity, UA 1.020 1.005 - 1.030    Blood, Urine Negative Negative    pH, UA 8.5 5.0 - 9.0    Protein, UA Negative Negative mg/dL    Urobilinogen, Urine 0.2 <2.0 E.U./dL    Nitrite, Urine Negative Negative    Leukocyte Esterase, Urine Negative Negative   Troponin   Result Value Ref Range    Troponin <0.01 0.00 - 0.03 ng/mL       Radiology  CT Head WO Contrast   Final Result      1. This study done after a CT scan abdomen and pelvis IV contrast   which is demonstrate normal expected enhancement of major intracranial   arteries and cause some limitations in the evaluation of the more   discrete intracranial hemorrhagic events as above discussed. 2. Cannot see well the left middle cerebral artery M1 segment when   compared with the right middle cerebral artery M1 segment which is   better visualized. This is not a definite finding, as the present   examination is not a CTA of the brain. 3.  In presence of a referred clinical history of stroke, can consider   correlation with the CTA of the head/CT Perfusion, alternative would   be MRI with diffusion images/MRA of the brain. ALERT:  ABNORMAL REPORT. CT ABDOMEN PELVIS W IV CONTRAST Additional Contrast? None   Final Result      1. No acute intra-abdominal findings. 2. Enlarged prostate.                           ------------------------- NURSING NOTES AND VITALS REVIEWED ---------------------------  Date / Time Roomed:  6/8/2019  4:56 PM  ED Bed Assignment:  23/23    The nursing notes within the ED encounter and vital signs as below have been reviewed. Patient Vitals for the past 24 hrs:   BP Temp Temp src Pulse Resp SpO2 Height Weight   06/08/19 1700 (!) 157/94 98.4 °F (36.9 °C) Oral 64 16 95 % 6' (1.829 m) 215 lb (97.5 kg)       Oxygen Saturation Interpretation: Normal      ------------------------------------------ PROGRESS NOTES ------------------------------------------  I have spoken with the patient and discussed todays results, in addition to providing specific details for the plan of care and counseling regarding the diagnosis and prognosis. Their questions are answered at this time and they are agreeable with the plan. I have discussed the risks and benefits of transfer and they wish to proceed with the transfer. --------------------------------- ADDITIONAL PROVIDER NOTES ---------------------------------  Consultations:  Spoke with Dr. Lucero Mathews (Medicine). Discussed case. They will accept transfer patient to [place] [address].    Reason for transfer: Neurology not available this facility. This patient's ED course included: a personal history and physicial examination, re-evaluation prior to disposition, multiple bedside re-evaluations, cardiac monitoring, continuous pulse oximetry and complex medical decision making and emergency management     Discussed imaging and laboratory results the patient. Patient's CT head was negative for acute pathology. Abdomen was also negative for acute pathology.  Due to patient's complaint of slurred speech, consideration for TIA

## 2019-06-09 ENCOUNTER — APPOINTMENT (OUTPATIENT)
Dept: MRI IMAGING | Age: 54
End: 2019-06-09
Attending: INTERNAL MEDICINE
Payer: COMMERCIAL

## 2019-06-09 VITALS
HEART RATE: 87 BPM | SYSTOLIC BLOOD PRESSURE: 128 MMHG | TEMPERATURE: 98 F | RESPIRATION RATE: 16 BRPM | DIASTOLIC BLOOD PRESSURE: 82 MMHG | OXYGEN SATURATION: 96 %

## 2019-06-09 LAB
CHOLESTEROL, TOTAL: 179 MG/DL (ref 0–199)
EKG ATRIAL RATE: 75 BPM
EKG P AXIS: 23 DEGREES
EKG P-R INTERVAL: 188 MS
EKG Q-T INTERVAL: 390 MS
EKG QRS DURATION: 102 MS
EKG QTC CALCULATION (BAZETT): 435 MS
EKG R AXIS: 13 DEGREES
EKG T AXIS: 24 DEGREES
EKG VENTRICULAR RATE: 75 BPM
HDLC SERPL-MCNC: 49 MG/DL
LDL CHOLESTEROL CALCULATED: 102 MG/DL (ref 0–99)
LV EF: 65 %
LVEF MODALITY: NORMAL
TRIGL SERPL-MCNC: 139 MG/DL (ref 0–149)
TROPONIN: <0.01 NG/ML (ref 0–0.03)
VLDLC SERPL CALC-MCNC: 28 MG/DL

## 2019-06-09 PROCEDURE — 70551 MRI BRAIN STEM W/O DYE: CPT

## 2019-06-09 PROCEDURE — 36415 COLL VENOUS BLD VENIPUNCTURE: CPT

## 2019-06-09 PROCEDURE — G0378 HOSPITAL OBSERVATION PER HR: HCPCS

## 2019-06-09 PROCEDURE — 93010 ELECTROCARDIOGRAM REPORT: CPT | Performed by: INTERNAL MEDICINE

## 2019-06-09 PROCEDURE — 99253 IP/OBS CNSLTJ NEW/EST LOW 45: CPT | Performed by: PSYCHIATRY & NEUROLOGY

## 2019-06-09 PROCEDURE — 2580000003 HC RX 258: Performed by: INTERNAL MEDICINE

## 2019-06-09 PROCEDURE — 84484 ASSAY OF TROPONIN QUANT: CPT

## 2019-06-09 PROCEDURE — 80061 LIPID PANEL: CPT

## 2019-06-09 PROCEDURE — 6360000002 HC RX W HCPCS: Performed by: INTERNAL MEDICINE

## 2019-06-09 PROCEDURE — 6370000000 HC RX 637 (ALT 250 FOR IP): Performed by: INTERNAL MEDICINE

## 2019-06-09 PROCEDURE — 93306 TTE W/DOPPLER COMPLETE: CPT

## 2019-06-09 PROCEDURE — 97165 OT EVAL LOW COMPLEX 30 MIN: CPT

## 2019-06-09 RX ORDER — CLONAZEPAM 0.5 MG/1
0.75 TABLET ORAL NIGHTLY PRN
Status: DISCONTINUED | OUTPATIENT
Start: 2019-06-09 | End: 2019-06-09 | Stop reason: HOSPADM

## 2019-06-09 RX ORDER — FENOFIBRATE 54 MG/1
54 TABLET ORAL DAILY
Status: DISCONTINUED | OUTPATIENT
Start: 2019-06-09 | End: 2019-06-09 | Stop reason: HOSPADM

## 2019-06-09 RX ORDER — ASPIRIN 81 MG/1
81 TABLET ORAL DAILY
Qty: 30 TABLET | Refills: 3 | Status: SHIPPED | OUTPATIENT
Start: 2019-06-09

## 2019-06-09 RX ORDER — LISINOPRIL 20 MG/1
40 TABLET ORAL DAILY
Status: DISCONTINUED | OUTPATIENT
Start: 2019-06-09 | End: 2019-06-09 | Stop reason: CLARIF

## 2019-06-09 RX ORDER — AMLODIPINE BESYLATE 2.5 MG/1
2.5 TABLET ORAL DAILY
Status: DISCONTINUED | OUTPATIENT
Start: 2019-06-09 | End: 2019-06-09 | Stop reason: HOSPADM

## 2019-06-09 RX ORDER — NEFAZODONE HYDROCHLORIDE 50 MG/1
100 TABLET ORAL 2 TIMES DAILY
Status: DISCONTINUED | OUTPATIENT
Start: 2019-06-09 | End: 2019-06-09 | Stop reason: DRUGHIGH

## 2019-06-09 RX ORDER — ATORVASTATIN CALCIUM 40 MG/1
40 TABLET, FILM COATED ORAL NIGHTLY
Qty: 30 TABLET | Refills: 3 | Status: SHIPPED | OUTPATIENT
Start: 2019-06-09 | End: 2019-10-22 | Stop reason: SDUPTHER

## 2019-06-09 RX ORDER — BUPROPION HYDROCHLORIDE 150 MG/1
150 TABLET ORAL EVERY MORNING
Status: DISCONTINUED | OUTPATIENT
Start: 2019-06-09 | End: 2019-06-09 | Stop reason: HOSPADM

## 2019-06-09 RX ORDER — LISINOPRIL 40 MG/1
40 TABLET ORAL DAILY
Status: DISCONTINUED | OUTPATIENT
Start: 2019-06-09 | End: 2019-06-09 | Stop reason: HOSPADM

## 2019-06-09 RX ORDER — NEFAZODONE HYDROCHLORIDE 50 MG/1
100 TABLET ORAL DAILY
Status: DISCONTINUED | OUTPATIENT
Start: 2019-06-09 | End: 2019-06-09 | Stop reason: SDUPTHER

## 2019-06-09 RX ORDER — NEFAZODONE HYDROCHLORIDE 50 MG/1
200 TABLET ORAL NIGHTLY
Status: DISCONTINUED | OUTPATIENT
Start: 2019-06-09 | End: 2019-06-09 | Stop reason: SDUPTHER

## 2019-06-09 RX ADMIN — BUPROPION HYDROCHLORIDE 150 MG: 150 TABLET ORAL at 09:17

## 2019-06-09 RX ADMIN — LISINOPRIL 40 MG: 40 TABLET ORAL at 09:17

## 2019-06-09 RX ADMIN — ASPIRIN 81 MG: 81 TABLET ORAL at 09:17

## 2019-06-09 RX ADMIN — Medication 10 ML: at 09:17

## 2019-06-09 RX ADMIN — Medication 100 MG: at 12:02

## 2019-06-09 RX ADMIN — AMLODIPINE BESYLATE 2.5 MG: 2.5 TABLET ORAL at 09:17

## 2019-06-09 ASSESSMENT — PAIN SCALES - GENERAL: PAINLEVEL_OUTOF10: 0

## 2019-06-09 NOTE — PROGRESS NOTES
[facility] called with a bed assignment at this time patient going to [id number]  27 main room 3940 call Nurse to Nurse to [phone]  [address] 30 min. Approx.  [phone]

## 2019-06-09 NOTE — PROGRESS NOTES
Dr. Stephanie Burrows notified that patient is wanting to leave against medical advice and not waiting for Echo to be read.

## 2019-06-09 NOTE — PROGRESS NOTES
Physical Therapy    Facility/Department: [address] [facility]    NAME: Julienne Cortez  : 1965  MRN: [id number]    Date of Service: 2019  Chart reviewed. Observed pt ambulating in room independently to bathroom this morning. Per OT pam, pt is independent with all mobility. Will discontinue skilled PT as pt has no acute care needs.     Aleena Yeh, PT, DPT  [id number]

## 2019-06-09 NOTE — PROGRESS NOTES
Hospitalist Progress Note      PCP: Vaughn Peres MD    Date of Admission: 6/8/2019  Days in the hospital: 0    Chief Complaint: slurred speech     Hospital Course:     Admitted for slurred speech that had resolved. CT of the head reportedly showed no acute process. Neurology was consulted. Subjective  Patient seen and examined at bedside. NAD, feels back to his baseline, has no complaints. Patient denies any fevers, chills, chest pain, shortness of breath, nausea, vomiting. Medications:  Reviewed    Infusion Medications   Scheduled Medications    amLODIPine  2.5 mg Oral Daily    buPROPion  150 mg Oral QAM    fenofibrate  54 mg Oral Daily    lisinopril  40 mg Oral Daily    sodium chloride flush  10 mL Intravenous 2 times per day    enoxaparin  40 mg Subcutaneous Daily    aspirin  81 mg Oral Daily    atorvastatin  40 mg Oral Nightly     PRN Meds: clonazePAM, perflutren lipid microspheres, sodium chloride flush, magnesium hydroxide, ondansetron      Intake/Output Summary (Last 24 hours) at 6/9/2019 0832  Last data filed at 6/9/2019 0726  Gross per 24 hour   Intake --   Output 600 ml   Net -600 ml       Exam:    /88   Pulse 104   Temp 98.2 °F (36.8 °C) (Temporal)   Resp 16   SpO2 96%     General appearance: No apparent distress, appears stated age and cooperative. HEENT: Conjunctivae/corneas clear. Pupils equal and round. No injections noted. Neck: Supple. No lesions, scars or masses. Trachea midline. Respiratory:  Normal respiratory effort. Clear to auscultation, bilaterally without Rales/Wheezes/Rhonchi. Cardiovascular: Regular rate and rhythm with normal S1/S2 without murmurs, rubs or gallops. Abdomen: Soft, non-tender, non-distended with normal bowel sounds. Musculoskeletal: No clubbing, cyanosis or edema bilaterally. Brisk capillary refill. 2+ lower extremity pulses (dorsalis pedis).    Skin:  No rashes    Neurologic: awake, alert and following commands       Labs: Recent Labs     06/08/19  1730   WBC 7.5   HGB 15.7   HCT 46.8        Recent Labs     06/08/19  1730      K 3.6   CL 99   CO2 24   BUN 17   CREATININE 0.8   CALCIUM 10.1     Recent Labs     06/08/19  1730   AST 14   ALT 17   BILITOT 0.5   ALKPHOS 47     No results for input(s): INR in the last 72 hours. Recent Labs     06/08/19  1730 06/08/19  2316 06/09/19  0330   TROPONINI <0.01 <0.01 <0.01       Imaging:  VL DUP CAROTID BILATERAL    (Results Pending)         Assessment/Plan:  Active Hospital Problems    Diagnosis Date Noted    TIA (transient ischemic attack) [G45.9] 06/08/2019     Slurred speech: Symptoms completely resolved at this time. Possible differentials include TIA. At this time he has no neurological deficits. CT of the head reportedly showed no acute process. , us carotids. TTE - pending. Will continue to observe. Monitor closely. Consult neurology.     Hypertension protection: Continue home medications with holding parameters:      Hyperlipidemia:  Continue home medications with holding parameters     Depression: Continue home medications with holding parameters      · DVT Prophylaxis  · lovenox    · Diet  · DIET LOW SODIUM 2 GM;    · Code Status  · Full Code    · PT/OT Eval Status  · eval and treat    · Disposition  · obs/tele          Amber Olson MD  [employer]  Please contact me through perfect serve    NOTE: This report was transcribed using voice recognition software. Every effort was made to ensure accuracy; however, inadvertent computerized transcription errors may be present.

## 2019-06-09 NOTE — H&P
Hospital Medicine History & Physical      PCP: Lamonte Vitale MD    Date of Admission: 6/8/2019      Chief Complaint:  Slurred speech      History Of Present Illness: This is a 51-year-old man with a history of depression, hypertension, anxiety, who presents with an episode of slurred speech. This started at about 2 PM while shopping. It was associated with fatigue. He went home however his symptoms did not improve so he went to the emergency room. However by the time he arrived to the [facility] ER his symptoms had completely resolved. He reports a similar episode in the past to much lesser degree. He denies  fever, chills, nausea, vomiting, diarrhea, constipation, chest pain,  LOC,syncope, orthopnea, PND, leg swelling, cough. At this time he has no new complaints. Except for mild headache and occasional palpitations. Past Medical History:          Diagnosis Date    Anxiety     Depression     HTN (hypertension)     Hypertriglyceridemia     Hypogonadotropic hypogonadism (HCC)     Insomnia     ZIA (obstructive sleep apnea)     Overweight        Past Surgical History:          Procedure Laterality Date    COLONOSCOPY  04/05/2012    tubular adenoma and tubulovillous adenoma.  FOOT SURGERY         Medications Prior to Admission:      Prior to Admission medications    Medication Sig Start Date End Date Taking? Authorizing Provider   amLODIPine (NORVASC) 2.5 MG tablet Take 1 tablet by mouth daily 5/21/19   Nevaeh Kiran MD   nefazodone (SERZONE) 100 MG tablet TAKE 2 TABLETS IN THE      EVENING AND 1 TABLET IN [facility] 5/21/19   Nevaeh Kiran MD   clonazePAM (KLONOPIN) 0.5 MG tablet Take 1.5 tablets by mouth nightly as needed (sleep) for up to 30 days.  5/13/19 6/12/19  Nevaeh Kiran MD   buPROPion (WELLBUTRIN XL) 150 MG extended release tablet Take 1 tablet by mouth every morning 5/13/19   Nevaeh Kiran MD   testosterone (ANDROGEL; TESTIM) 50 MG/5GM (1%) GEL 1% gel APPLY THE CONTENTS OF 2 TUBES ON MONDAY, WEDNESDAY, AND FRIDAY AND APPLY THE CONTENTS OF 1 TUBE ON THE OTHER DAYS OF THE WEEK 4/15/19 7/14/19  Krystle Phelps MD   lisinopril (PRINIVIL;ZESTRIL) 40 MG tablet Take 1 tablet by mouth daily 1/15/19   Krystle Phelps MD   fenofibrate 160 MG tablet Take 160 mg by mouth daily    Historical Provider, MD       Allergies:  Codeine    Social History:          TOBACCO:   reports that he quit smoking about 29 years ago. His smoking use included cigarettes. He has a 8.00 pack-year smoking history. He has never used smokeless tobacco.  ETOH:   reports that he drinks alcohol. Family History:       Reviewed in detail and negative for DM, CAD, Cancer, CVA. Positive as follows:        Problem Relation Age of Onset    No Known Problems Mother     Alcohol Abuse Father     Other Father          meningitis, low testosterone    Depression Daughter     ADHD Daughter        REVIEW OF SYSTEMS:   Pertinent positives as noted in the HPI. All other systems reviewed and negative. PHYSICAL EXAM:    There were no vitals taken for this visit. General appearance: No apparent distress, appears stated age and cooperative. HEENT: Normal cephalic, atraumatic without obvious deformity. . Conjunctivae/corneas clear. Neck: Supple, No jugular venous distention. Respiratory:  Normal respiratory effort. Clear to auscultation, bilaterally without Rales/Wheezes/Rhonchi. Cardiovascular: Regular rate and rhythm with normal S1/S2 without murmurs, rubs or gallops. Abdomen: Soft, non-tender, non-distended with normal bowel sounds. Musculoskeletal: No  edema bilaterally. Skin:  No rashes or lesions. Neurologic:  Cranial nerves: II-XII intact, grossly non-focal.  Psychiatric: Alert and oriented, thought content appropriate.         Labs:     Recent Labs     06/08/19  1730   WBC 7.5   HGB 15.7   HCT 46.8        Recent Labs     06/08/19  1730      K 3.6   CL 99   CO2 24   BUN 17   CREATININE 0. 8   CALCIUM 10.1     Recent Labs     06/08/19  1730   AST 14   ALT 17   BILITOT 0.5   ALKPHOS 47     No results for input(s): INR in the last 72 hours. Recent Labs     06/08/19  1730   TROPONINI <0.01       Urinalysis:      Lab Results   Component Value Date    NITRU Negative 06/08/2019    WBCUA 2-4 08/24/2018    RBCUA 2-4 08/24/2018    BLOODU Negative 06/08/2019    SPECGRAV 1.020 06/08/2019    GLUCOSEU Negative 06/08/2019         ASSESSMENT:    Active Hospital Problems    Diagnosis Date Noted    TIA (transient ischemic attack) [G45.9] 06/08/2019       PLAN:  Slurred speech: Symptoms completely resolved at this time. Possible differentials include TIA. At this time he has no neurological deficits. CT of the head reportedly showed no acute process. Get echo , us carotids. Will continue to observe. Monitor closely. Consult neurology.     Hypertension protection: Continue home medications with holding parameters:     Hyperlipidemia:  Continue home medications with holding parameters    Depression: Continue home medications with holding parameters          DVT Prophylaxis: Lovenox  Diet: No diet orders on file  Code Status: No Order         Brady Coleman MD

## 2019-06-09 NOTE — PROGRESS NOTES
Dr. Christel Oconnor notified that patient is currently refusing MRI even with medication, will await response

## 2019-06-09 NOTE — PROGRESS NOTES
Occupational Therapy  OCCUPATIONAL THERAPY INITIAL EVALUATION      Date:2019  Patient Name: Brenda Patel  MRN: [id number]  : 1965  Room: [address] [place], [place] [address]    Modified [place] Scale   Score     Description  0             No symptoms  1             No significant disability despite symptoms  2             Slight disability; able to look after own affairs  3             Moderate disability; able to ambulate without assist/ requires assist with ADLs  4             Moderate/Severe disability;requires assist to ambulate/assist with ADLs  5             Severe disability;bedridden/incontinent   6               Score:  0    Evaluating OT: Sunshine Whitehead OTR/L [id number]     AM-PAC Daily Activity Raw Score:   Recommended Adaptive Equipment: none     Diagnosis: TIA   Surgery: none    Pertinent Medical History: HTN, Anxiety     Precautions:  Low fall risk     Home Living: Pt lives with wife  in a 2 story home with 6 step(s) to enter and B hand  rail(s); bed/bath on 2nd floor. There is a full bath on th 1st floor    Bathroom setup: tub./shower    Equipment owned: none   Prior Level of Function: Independent  with ADLs , Independent  with IADLs; using no for ambulation. Driving: pt drives   Occupation: works at [employer]     Pain Level: none   Cognition: A&O: 4/4; Follows multi  step directions   Memory:  good    Sequencing:  good    Problem solving:  good    Judgement/safety:  good      Functional Assessment:   Initial Eval Status  Date: 19  Treatment Status  Date: Short Term Goals  Treatment frequency: N/A   Feeding Independent      Grooming Independent      UB Dressing Independent       LB Dressing Independent       Bathing NT      Toileting Independent      Bed Mobility  Supine to sit: Independent   Sit to supine: Independent       Functional Transfers Sit to stand: Independent   Stand to sit:  Independent   Stand pivot: Independent      Functional Mobility Independent      Balance Sitting: Static:  Independent     Dynamic:independent   Standing: independent      Activity Tolerance Good      Visual/  Perceptual Glasses: pt does not wear glasses                 Hand dominance: right   UE ROM: RUE:  WFL  LUE:  WFL  Strength: RUE: grossly 5/5 LUE: grossly 5/5   Strength: B WFL  Fine Motor Coordination:  WFL    Hearing: WFL  Sensation:  No c/o numbness or tingling  Tone:  WFL  Edema: none                             Comments/Treatment: Upon arrival, patient was supine in bed and agreeable to OT eval .  At end of session, patient was seated edge of bed with wife present and  call light and phone within reach. Pt does not require any skilled OT services at this time . Eval Complexity: Low    Assessment of current deficits N/A OT eval only   Functional mobility []  ADLs [] Strength []  Cognition []  Functional transfers  [] IADLs [] Safety Awareness []  Endurance []  Fine Motor Coordination [] Balance [] Vision/perception [] Sensation []   Gross Motor Coordination [] ROM [] Delirium []                  Motor Control []    Plan of Care: N/A OT eval only   ADL retraining []   Equipment needs []   Neuromuscular re-education [] Energy Conservation Techniques []  Functional Transfer training [] Patient and/or Family Education []  Functional Mobility training []  Environmental Modifications []  Cognitive re-training []   Compensatory techniques for ADLs []  Splinting Needs []   Positioning to improve overall function []   Therapeutic Activity []  Therapeutic Exercise  []  Visual/Perceptual: []    Delirium prevention/treatment  []   Other:  []    Rehab Potential: N/A OT eval only. No skilled OT services recommended pt is independent with self care, transfers and mobility     Patient / Family Goal: get home     Patient and/or family were instructed diagnosis, prognosis/goals and plan of care. Demonstrated good  understanding.     [] Malnutrition indicators have been identified and nursing has been notified to

## 2019-06-09 NOTE — PROGRESS NOTES
Left message with Echo regarding Echo needing read in morning so patient can discharge, will inform oncoming staff

## 2019-06-09 NOTE — PROGRESS NOTES
Dr. Isatu Mosher notified that Dr. Randall Woodward inquiring about possible atrial fibrillation on telemetry readings and consult to EP if necessary, will await orders

## 2019-06-09 NOTE — CONSULTS
Consult to Neurology  Consult performed by: Darien Gonzalez MD  Consult ordered by: Suki Hackett MD        Neurology Consult Note    6/9/2019     REASON FOR CONSULTATION:   TIA     HISTORY OF PRESENT ILLNESS:     65-year-old man with a history of hypertension presented to the hospital because of abdominal pain. Over in the ED he revealed that he felt suddenly weak on the day of admission with some slurred speech. He was outside with his wife shopping when he gradually felt nausea, lightheaded and his speech per wife was as if he was drunk to the extent that she was not able to make sense of some of the words. This resolved gradually over the next 2 hours. At the time there was no double vision, blurriness, weakness or numbness in face, arms or legs. He is now back to baseline with no visual disturbances, headache, dysphagia. He says he has hx of PVC and yesterday he felt the episode was related to frequent PVCs. He has similar symptoms in past.     Past Medical History:   Diagnosis Date    Anxiety     Depression     HTN (hypertension)     Hypertriglyceridemia     Hypogonadotropic hypogonadism (HCC)     Insomnia     ZIA (obstructive sleep apnea)     Overweight         Past Surgical History:   Procedure Laterality Date    COLONOSCOPY  04/05/2012    tubular adenoma and tubulovillous adenoma.  FOOT SURGERY         Prior to Admission medications    Medication Sig Start Date End Date Taking?  Authorizing Provider   aspirin 81 MG EC tablet Take 1 tablet by mouth daily 6/9/19  Yes Doreen Arambula MD   atorvastatin (LIPITOR) 40 MG tablet Take 1 tablet by mouth nightly 6/9/19  Yes Doreen Arambula MD   amLODIPine (NORVASC) 2.5 MG tablet Take 1 tablet by mouth daily 5/21/19   Aaron Santillan MD   nefazodone (SERZONE) 100 MG tablet TAKE 2 TABLETS IN THE      EVENING AND 1 TABLET IN [facility] 5/21/19   Aaron Santillan MD   clonazePAM (KLONOPIN) 0.5 MG tablet Take 1.5 tablets by mouth nightly as needed (sleep) for up to 30 days. 19  Carlos Mccarty MD   buPROPion (WELLBUTRIN XL) 150 MG extended release tablet Take 1 tablet by mouth every morning 19   Carlos Mccarty MD   testosterone (ANDROGEL; TESTIM) 50 MG/5GM (1%) GEL 1% gel APPLY THE CONTENTS OF 2 TUBES ON MONDAY, WEDNESDAY, AND FRIDAY AND APPLY THE CONTENTS OF 1 TUBE ON THE OTHER DAYS OF THE WEEK 4/15/19 7/14/19  Carlos Mccarty MD   lisinopril (PRINIVIL;ZESTRIL) 40 MG tablet Take 1 tablet by mouth daily 1/15/19   Carlos Mccarty MD       Allergies:  Codeine     Family History   Problem Relation Age of Onset    No Known Problems Mother     Alcohol Abuse Father     Other Father          meningitis, low testosterone    Depression Daughter     ADHD Daughter        Social History     Tobacco Use    Smoking status: Former Smoker     Packs/day: 1.00     Years: 8.00     Pack years: 8.00     Types: Cigarettes     Last attempt to quit:      Years since quittin.4    Smokeless tobacco: Never Used   Substance Use Topics    Alcohol use: Yes     Comment: occasional     Drug use: No          ROS:  14 points review of sytem were checked and were neg except as above      EXAMINATION:  /88   Pulse 104   Temp 98.2 °F (36.8 °C) (Temporal)   Resp 16   SpO2 96%     AAOx3, follows commands, no aphasia/dysarthria. PERRL, EOMI, VFF, normal saccades and pursuit, no gaze preference/nystagmus. Intact facial sensation, no asymmetry. Intact hearing bilaterally. Tongue midline. Symmetric palate elevation. Neck muscles and shoulder shrug 5/5. Sensation: intact all over to LT and proprioception, no neglect. Motor: 5/5 all four extremities. Normal bulk and tone, No drift/orbiting. DTRs: +2 biceps/triceps/BR/Knees, +1 ankles, plantars down B/L. Coordination: intact F-N and H-S B/L. No dysmetria.  Intact PATRICIA.    ASSESSMENT:  In the CT head done after contrasted CT abdomen the brain tissue didn't reveal any significant intracranial process but per report by radiologist the left MCA was not visualized as good as the right side. LDL was 102. He refused MRI due to claustrophobia even with pre-medication. PLAN:   He received contrast yesterday. Therefore CTA head and neck can be done as outpatient in next week. HbA1C was added as \"add on\". He was started on aspirin 81 mg and Lipitor 40 mg. If CTA head and neck do not reveal any significant stenosis, ASA can be discontinued. I tried to define the \"irregular RR\" on the tele monitoring. I am not able to rule out atrial fibrillation and I defer this to primary doctor here and if appropriate per him/her, EP consult. Neurology will sign off. Please call us with questions/additional, persistent or new concerns.       Electronically signed by Dyan Elias MD on 6/9/2019 at 11:00 AM

## 2019-06-11 ENCOUNTER — OFFICE VISIT (OUTPATIENT)
Dept: FAMILY MEDICINE CLINIC | Age: 54
End: 2019-06-11
Payer: COMMERCIAL

## 2019-06-11 VITALS
HEIGHT: 72 IN | TEMPERATURE: 97.9 F | BODY MASS INDEX: 28.04 KG/M2 | WEIGHT: 207 LBS | SYSTOLIC BLOOD PRESSURE: 90 MMHG | HEART RATE: 82 BPM | OXYGEN SATURATION: 98 % | DIASTOLIC BLOOD PRESSURE: 62 MMHG

## 2019-06-11 DIAGNOSIS — R10.30 LOWER ABDOMINAL PAIN: ICD-10-CM

## 2019-06-11 DIAGNOSIS — R29.818 TRANSIENT NEUROLOGICAL SYMPTOMS: Primary | ICD-10-CM

## 2019-06-11 DIAGNOSIS — F51.01 PRIMARY INSOMNIA: ICD-10-CM

## 2019-06-11 DIAGNOSIS — I10 ESSENTIAL HYPERTENSION: ICD-10-CM

## 2019-06-11 PROCEDURE — 1111F DSCHRG MED/CURRENT MED MERGE: CPT | Performed by: FAMILY MEDICINE

## 2019-06-11 PROCEDURE — 99214 OFFICE O/P EST MOD 30 MIN: CPT | Performed by: FAMILY MEDICINE

## 2019-06-11 RX ORDER — CLONAZEPAM 0.5 MG/1
0.5 TABLET ORAL NIGHTLY PRN
Qty: 30 TABLET | Refills: 0 | Status: SHIPPED | OUTPATIENT
Start: 2019-06-11 | End: 2019-07-15 | Stop reason: SDUPTHER

## 2019-06-11 NOTE — PROGRESS NOTES
Post-Discharge Transitional Care Management Services or Hospital Follow Up      Kellie Desai   YOB: 1965    Date of Office Visit:  6/11/2019  Date of Hospital Admission: 6/8/19  Date of Hospital Discharge: 6/9/19  Risk of hospital readmission (high >=14%. Medium >=10%) :Readmission Risk Score: 0      Care management risk score Rising risk (score 2-5) and Complex Care (Scores >=6): 1     Non face to face  following discharge, date last encounter closed (first attempt may have been earlier): *No documented post hospital discharge outreach found in the last 14 days    Call initiated 2 business days of discharge: *No response recorded in the last 14 days    Patient Active Problem List   Diagnosis    Moderate episode of recurrent major depressive disorder ([facility] [address]    Primary insomnia    Anxiety    Hypogonadotropic hypogonadism ([facility] [address]    Hypertriglyceridemia    HTN (hypertension)    ZIA (obstructive sleep apnea)    Overweight    Insomnia    TIA (transient ischemic attack)       Allergies   Allergen Reactions    Codeine Other (See Comments)     Hallucinations and sweating    Hctz [Hydrochlorothiazide]      Causes fatigue, angina. Medications listed as ordered at the time of discharge from [facility] Medication Instructions ERICKA:    Printed on:06/11/19 2751   Medication Information                      amLODIPine (NORVASC) 2.5 MG tablet  Take 1 tablet by mouth daily             aspirin 81 MG EC tablet  Take 1 tablet by mouth daily             atorvastatin (LIPITOR) 40 MG tablet  Take 1 tablet by mouth nightly             buPROPion (WELLBUTRIN XL) 150 MG extended release tablet  Take 1 tablet by mouth every morning             clonazePAM (KLONOPIN) 0.5 MG tablet  Take 1 tablet by mouth nightly as needed (sleep) for up to 30 days.              lisinopril (PRINIVIL;ZESTRIL) 40 MG tablet  Take 1 tablet by mouth daily             nefazodone (SERZONE) 100 MG tablet  TAKE 2 TABLETS IN THE      EVENING AND 1 TABLET IN THEMORNING             testosterone (ANDROGEL; TESTIM) 50 MG/5GM (1%) GEL 1% gel  APPLY THE CONTENTS OF 2 TUBES ON MONDAY, WEDNESDAY, AND FRIDAY AND APPLY THE CONTENTS OF 1 TUBE ON THE OTHER DAYS OF THE WEEK                   Medications marked \"taking\" at this time  Outpatient Medications Marked as Taking for the 6/11/19 encounter (Office Visit) with Silverio Mares MD   Medication Sig Dispense Refill    clonazePAM (KLONOPIN) 0.5 MG tablet Take 1 tablet by mouth nightly as needed (sleep) for up to 30 days. 30 tablet 0    aspirin 81 MG EC tablet Take 1 tablet by mouth daily 30 tablet 3    atorvastatin (LIPITOR) 40 MG tablet Take 1 tablet by mouth nightly 30 tablet 3    amLODIPine (NORVASC) 2.5 MG tablet Take 1 tablet by mouth daily 30 tablet 0    nefazodone (SERZONE) 100 MG tablet TAKE 2 TABLETS IN THE      EVENING AND 1 TABLET IN THEMORNING 270 tablet 0    buPROPion (WELLBUTRIN XL) 150 MG extended release tablet Take 1 tablet by mouth every morning 90 tablet 1    testosterone (ANDROGEL; TESTIM) 50 MG/5GM (1%) GEL 1% gel APPLY THE CONTENTS OF 2 TUBES ON MONDAY, WEDNESDAY, AND FRIDAY AND APPLY THE CONTENTS OF 1 TUBE ON THE OTHER DAYS OF THE WEEK 600 g 2    lisinopril (PRINIVIL;ZESTRIL) 40 MG tablet Take 1 tablet by mouth daily 90 tablet 1        Medications patient taking as of now reconciled against medications ordered at time of hospital discharge: Yes    Chief Complaint   Patient presents with    Care Management     TCM-TIA       History of Present illness - Follow up of Hospital diagnosis(es): abdominal pain, ? TIA    Recently, he was transitioned to amlodipine from HCTZ bc he thought the HCTZ was giving him problems -see telephone encounter. He ended up switching back to the HCTZ about 2 days prior to hospital admission.   He was shopping at [facility] when he became somewhat confused really had to think about what he was doing, had some slurred speech and record. Since returning home, he is actually felt excellent. His blood pressure has been controlled, which was unexpected. It is actually low today, but he is asymptomatic. On my recheck I got 96/66. His abdominal pain has been resolved. We discussed that if he actually did have a stroke or TIA, testosterone therapy probably ought to be discontinued. He has already considered that. He said that \"from the patient point of view, if you told me I had to pick [between stopping the testosterone and having a low to risk of heart attack and stroke or continuing the testosterone and having a high risk of heart attack or stroke] - id take the testosterone because I feel useless without out. \"  As such, we are discontinuing it yet. It was recommended by neurology that he have outpatient CTA of her head and neck performed to see if he truly does have decreased flow to the left M1 segment of the middle cerebral artery. He did not have any right-sided weakness during his possible neurologic event. Vitals:    06/11/19 1350   BP: 90/62   Site: Right Upper Arm   Position: Sitting   Cuff Size: Medium Adult   Pulse: 82   Temp: 97.9 °F (36.6 °C)   TempSrc: Oral   SpO2: 98%   Weight: 207 lb (93.9 kg)   Height: 6' (1.829 m)     Body mass index is 28.07 kg/m².    Wt Readings from Last 3 Encounters:   06/11/19 207 lb (93.9 kg)   06/08/19 215 lb (97.5 kg)   05/13/19 212 lb (96.2 kg)     BP Readings from Last 3 Encounters:   06/11/19 90/62   06/09/19 128/82   06/08/19 (!) 158/100        Physical Exam:  General Appearance: alert and oriented to person, place and time, well developed and well- nourished, in no acute distress  Skin: warm and dry, no rash or erythema  Head: normocephalic and atraumatic  Eyes: pupils equal, round, and reactive to light, extraocular eye movements intact, conjunctivae normal  ENT: tympanic membrane, external ear and ear canal normal bilaterally, nose without deformity, nasal mucosa and turbinates normal without polyps  Neck: supple and non-tender without mass, no thyromegaly or thyroid nodules, no cervical lymphadenopathy  Pulmonary/Chest: clear to auscultation bilaterally- no wheezes, rales or rhonchi, normal air movement, no respiratory distress  Cardiovascular: normal rate, regular rhythm, normal S1 and S2, no murmurs, rubs, clicks, or gallops, distal pulses intact, no carotid bruits  Abdomen: soft, non-tender, non-distended, normal bowel sounds, no masses or organomegaly  Extremities: no cyanosis, clubbing or edema  Musculoskeletal: normal range of motion, no joint swelling, deformity or tenderness  Neurologic: reflexes normal and symmetric, no cranial nerve deficit, gait, coordination and speech normal    Assessment/Plan:  1. Transient neurological symptoms  Unclear etiology. This could have potentially been from hypotension while shopping in [employer]. His blood pressure was low today, though he is asymptomatic. Symptoms also could have been from the reaction to hydrochlorothiazide which he has not done well with in the past.  I added it to his medication intolerances. His symptoms could have potentially been from a true neurologic event. There is no evidence of this on CT scan and there was an incomplete evaluation of his intracranial vasculature. He should have follow-up scans performed in an outpatient setting. These have been ordered, and needs scheduled. In the meantime he will continue aspirin and Lipitor. 2. Essential hypertension  Blood pressure is now controlled, possibly overtreated with current doses of lisinopril and amlodipine. This will be continued given that he is asymptomatic and he will monitor his blood pressure closely. 3. Primary insomnia  He continues to tolerate decreasing doses of Klonopin.   We will lower his dose from 0.75 mg nightly down to 0.5 mg nightly as this is the dose he was taking while hospitalized and did fine with it.  - clonazePAM (Abbe Bears)

## 2019-06-11 NOTE — Clinical Note
Please make sure patient gets scheduled for CTA of head and neck that was ordered during his hospitalization and to be completed as an outpatient.

## 2019-06-13 RX ORDER — AMLODIPINE BESYLATE 2.5 MG/1
2.5 TABLET ORAL DAILY
Qty: 30 TABLET | Refills: 3 | Status: SHIPPED
Start: 2019-06-13 | End: 2019-07-15 | Stop reason: SDUPTHER

## 2019-06-24 ENCOUNTER — HOSPITAL ENCOUNTER (OUTPATIENT)
Dept: CT IMAGING | Age: 54
Discharge: HOME | End: 2019-06-26
Payer: COMMERCIAL

## 2019-06-24 DIAGNOSIS — G45.9 TIA (TRANSIENT ISCHEMIC ATTACK): ICD-10-CM

## 2019-06-24 PROCEDURE — 70498 CT ANGIOGRAPHY NECK: CPT

## 2019-06-24 PROCEDURE — 6360000004 HC RX CONTRAST MEDICATION: Performed by: RADIOLOGY

## 2019-06-24 PROCEDURE — 70496 CT ANGIOGRAPHY HEAD: CPT

## 2019-06-24 RX ADMIN — IOPAMIDOL 110 ML: 755 INJECTION, SOLUTION INTRAVENOUS at 15:08

## 2019-07-02 NOTE — DISCHARGE SUMMARY
Hospital Medicine Discharge Summary    Patient ID: Hieu Hernandez      Patient's PCP: Fred Lr MD    Admit Date: 6/8/2019     Discharge Date:  6/9/2019      Admitting Physician: Tammy Hagan MD     Discharge Physician: Tyesha Caraballo MD     Discharge Diagnoses: Active Hospital Problems    Diagnosis Date Noted    TIA (transient ischemic attack) [G45.9] 06/08/2019       The patient was seen and examined on day of discharge and this discharge summary is in conjunction with any daily progress note from day of discharge. Admission HPI: This is a 27-year-old man with a history of depression, hypertension, anxiety, who presents with an episode of slurred speech. This started at about 2 PM while shopping. It was associated with fatigue. He went home however his symptoms did not improve so he went to the emergency room. However by the time he arrived to the [place] [address] ER his symptoms had completely resolved. He reports a similar episode in the past to much lesser degree. He denies  fever, chills, nausea, vomiting, diarrhea, constipation, chest pain,  LOC,syncope, orthopnea, PND, leg swelling, cough. At this time he has no new complaints. Except for mild headache and occasional palpitations. Hospital Course:   Mr. Hieu Hernandez, a 48y.o. year old male  who  has a past medical history of Anxiety, Depression, HTN (hypertension), Hypertriglyceridemia, Hypogonadotropic hypogonadism ([address] Insomnia, ZIA (obstructive sleep apnea), and Overweight. During the course the patient's hospital stay Admitted for slurred speech that had resolved. CT of the head reportedly showed no acute process. Neurology was consulted. He was started on aspirin 81 mg and Lipitor 40 mg. He refused MRI. He was in stable condition and signed out AMA.         Consults:     IP CONSULT TO NEUROLOGY    Significant Diagnostic Studies:  As above      Discharge Instructions/Follow-up:  As above       Activity: activity as tolerated    Physical Exam:  Vitals:    06/09/19 1120   BP: 128/82   Pulse: 87   Resp: 16   Temp: 98 °F (36.7 °C)   SpO2:        General appearance: No apparent distress, appears stated age and cooperative. HEENT: Conjunctivae/corneas clear. Pupils equal and round. No injections noted. Neck: Supple. No lesions, scars or masses. Trachea midline. Respiratory:  Normal respiratory effort. Clear to auscultation, bilaterally without Rales/Wheezes/Rhonchi. Cardiovascular: Regular rate and rhythm with normal S1/S2 without murmurs, rubs or gallops. Abdomen: Soft, non-tender, non-distended with normal bowel sounds. Musculoskeletal: No clubbing, cyanosis or edema bilaterally. Brisk capillary refill. 2+ lower extremity pulses (dorsalis pedis). Skin:  No rashes    Neurologic: awake, alert and following commands         Labs:  For convenience and continuity at follow-up the following most recent labs are provided:      CBC:    Lab Results   Component Value Date    WBC 7.5 06/08/2019    HGB 15.7 06/08/2019    HCT 46.8 06/08/2019     06/08/2019       Renal:    Lab Results   Component Value Date     06/08/2019    K 3.6 06/08/2019    CL 99 06/08/2019    CO2 24 06/08/2019    BUN 17 06/08/2019    CREATININE 0.8 06/08/2019    CALCIUM 10.1 06/08/2019       Imaging:  No orders to display         Discharge Medications:     Discharge Medication List as of 6/9/2019  8:27 PM           Details   aspirin 81 MG EC tablet Take 1 tablet by mouth daily, Disp-30 tablet, R-3Normal      atorvastatin (LIPITOR) 40 MG tablet Take 1 tablet by mouth nightly, Disp-30 tablet, R-3Normal              Details   nefazodone (SERZONE) 100 MG tablet TAKE 2 TABLETS IN THE      EVENING AND 1 TABLET IN THEMORNING, Disp-270 tablet, R-0Normal      amLODIPine (NORVASC) 2.5 MG tablet Take 1 tablet by mouth daily, Disp-30 tablet, R-0Normal      buPROPion (WELLBUTRIN XL) 150 MG extended release tablet Take 1 tablet by mouth every morning, Disp-90 tablet, R-1Normal      clonazePAM (KLONOPIN) 0.5 MG tablet Take 1.5 tablets by mouth nightly as needed (sleep) for up to 30 days. , Disp-45 tablet, R-0Normal      testosterone (ANDROGEL; TESTIM) 50 MG/5GM (1%) GEL 1% gel APPLY THE CONTENTS OF 2 TUBES ON MONDAY, WEDNESDAY, AND FRIDAY AND APPLY THE CONTENTS OF 1 TUBE ON THE OTHER DAYS OF THE WEEK, Disp-600 g, R-2Normal      lisinopril (PRINIVIL;ZESTRIL) 40 MG tablet Take 1 tablet by mouth daily, Disp-90 tablet, R-1Normal             Time Spent on discharge is more than 31 min in the examination, evaluation, counseling and review of medications and discharge plan. Toni Benoit MD   7/2/2019      Thank you Fred Lr MD for the opportunity to be involved in this patient's care. If you have any questions or concerns please feel free to contact me. NOTE: This report was transcribed using voice recognition software. Every effort was made to ensure accuracy; however, inadvertent computerized transcription errors may be present.

## 2019-07-15 ENCOUNTER — OFFICE VISIT (OUTPATIENT)
Dept: FAMILY MEDICINE CLINIC | Age: 54
End: 2019-07-15
Payer: COMMERCIAL

## 2019-07-15 VITALS
OXYGEN SATURATION: 95 % | TEMPERATURE: 98.1 F | BODY MASS INDEX: 28.48 KG/M2 | DIASTOLIC BLOOD PRESSURE: 80 MMHG | HEART RATE: 92 BPM | WEIGHT: 210 LBS | SYSTOLIC BLOOD PRESSURE: 122 MMHG

## 2019-07-15 DIAGNOSIS — F51.01 PRIMARY INSOMNIA: ICD-10-CM

## 2019-07-15 DIAGNOSIS — I10 ESSENTIAL HYPERTENSION: Primary | ICD-10-CM

## 2019-07-15 PROCEDURE — 99213 OFFICE O/P EST LOW 20 MIN: CPT | Performed by: FAMILY MEDICINE

## 2019-07-15 RX ORDER — CLONAZEPAM 0.5 MG/1
.25-.5 TABLET ORAL NIGHTLY PRN
Qty: 20 TABLET | Refills: 0 | Status: SHIPPED | OUTPATIENT
Start: 2019-07-15 | End: 2019-08-13 | Stop reason: SDUPTHER

## 2019-07-15 RX ORDER — AMLODIPINE BESYLATE 2.5 MG/1
2.5 TABLET ORAL DAILY
Qty: 90 TABLET | Refills: 1 | Status: SHIPPED | OUTPATIENT
Start: 2019-07-15 | End: 2019-12-16

## 2019-07-15 NOTE — PROGRESS NOTES
sob, No cough,   No abd pain, No heartburn,   No headaches,   No tingling, No numbness, No weakness,   No bowel changes, No hematochezia, No melena,  No bladder changes, No hematuria  No skin rashes, No skin lesions. No vision changes, No hearing changes,   No polyuria, polydipsia, polyphagia. Stable mood. ROS otherwise negative unless as listed in HPI. Chart reviewed and updated where appropriate for PMH, Fam, and Soc Hx. Physical Exam   /80 (Site: Left Upper Arm, Position: Sitting, Cuff Size: Medium Adult)   Pulse 92   Temp 98.1 °F (36.7 °C) (Oral)   Wt 210 lb (95.3 kg)   SpO2 95%   BMI 28.48 kg/m²   Wt Readings from Last 3 Encounters:   07/15/19 210 lb (95.3 kg)   06/11/19 207 lb (93.9 kg)   06/08/19 215 lb (97.5 kg)       Constitutional:    He is oriented to person, place, and time. He appears well-developed and well-nourished. HENT:    Right Ear: Tympanic membrane, external ear and ear canal normal.  Suspect probable effusions bilaterally. Left Ear: Tympanic membrane, external ear and ear canal normal.    Nose: Nose normal.    Mouth/Throat: Oropharynx is clear and moist.   Eyes:    Conjunctivae are normal.    Pupils are equal, round, and reactive to light. EOMI. Neck:    Normal range of motion. No thyromegaly or nodules noted. No bruit. Cardiovascular:    Normal rate, regular rhythm and normal heart sounds. No murmur. No gallop and no friction rub. Pulmonary/Chest:    Effort normal and breath sounds normal.    No wheezes. No rales or rhonchi. Abdominal:    Soft. Bowel sounds are normal.    No distension. No tenderness. Psychiatric:    He has a normal mood and affect. Normal groom and dress.     Current Outpatient Medications on File Prior to Visit   Medication Sig Dispense Refill    aspirin 81 MG EC tablet Take 1 tablet by mouth daily 30 tablet 3    atorvastatin (LIPITOR) 40 MG tablet Take 1 tablet by mouth nightly 30 tablet 3    nefazodone (SERZONE) 100 MG tablet TAKE 2 TABLETS IN THE      EVENING AND 1 TABLET IN THEMORNING 270 tablet 0    buPROPion (WELLBUTRIN XL) 150 MG extended release tablet Take 1 tablet by mouth every morning 90 tablet 1    lisinopril (PRINIVIL;ZESTRIL) 40 MG tablet Take 1 tablet by mouth daily 90 tablet 1    testosterone (ANDROGEL; TESTIM) 50 MG/5GM (1%) GEL 1% gel APPLY THE CONTENTS OF 2 TUBES ON MONDAY, WEDNESDAY, AND FRIDAY AND APPLY THE CONTENTS OF 1 TUBE ON THE OTHER DAYS OF THE WEEK 600 g 2     No current facility-administered medications on file prior to visit. Patient Active Problem List   Diagnosis Code    Moderate episode of recurrent major depressive disorder ([facility] F33.1    Primary insomnia F51.01    Anxiety F41.9    Hypogonadotropic hypogonadism ([facility] E23.0    Hypertriglyceridemia E78.1    HTN (hypertension) I10    ZIA (obstructive sleep apnea) G47.33    Overweight E66.3    Insomnia G47.00    TIA (transient ischemic attack) G45.9       Assessment / Sarabjit Marrero was seen today for other. Diagnoses and all orders for this visit:    Essential hypertension  Continue lisinopril at same dose. Refill amlodipine. Blood pressure nicely controlled and stable. Primary insomnia, stable  -Lower dose. ClonazePAM (KLONOPIN) 0.5 MG tablet; Take 0.5-1 tablets by mouth nightly as needed (sleep) for up to 30 days. Continue goal of stopping medication. He has weaned down over time. Oars report reviewed and consistent. Down to 0.25 mg nightly, with the option to take 0.5 mg nightly if needed/doing poorly. Other orders  -     amLODIPine (NORVASC) 2.5 MG tablet; Take 1 tablet by mouth daily    Return in about 3 months (around 10/15/2019). Patient counseled to follow up sooner or seek more acute care if symptoms worsening. Electronically signed by Ailyn Lozano MD on 7/15/2019    This note may have been created using dictation software.  Efforts were made to reduce grammatical or syntax errors, but some may

## 2019-08-13 DIAGNOSIS — F51.01 PRIMARY INSOMNIA: ICD-10-CM

## 2019-08-13 RX ORDER — CLONAZEPAM 0.5 MG/1
0.5 TABLET ORAL NIGHTLY PRN
Qty: 30 TABLET | Refills: 0 | Status: SHIPPED | OUTPATIENT
Start: 2019-08-13 | End: 2019-09-10 | Stop reason: SDUPTHER

## 2019-08-19 ENCOUNTER — OFFICE VISIT (OUTPATIENT)
Dept: FAMILY MEDICINE CLINIC | Age: 54
End: 2019-08-19
Payer: COMMERCIAL

## 2019-08-19 VITALS
TEMPERATURE: 98.3 F | BODY MASS INDEX: 28.44 KG/M2 | HEART RATE: 85 BPM | SYSTOLIC BLOOD PRESSURE: 130 MMHG | HEIGHT: 72 IN | DIASTOLIC BLOOD PRESSURE: 80 MMHG | OXYGEN SATURATION: 97 % | WEIGHT: 210 LBS

## 2019-08-19 DIAGNOSIS — F41.9 ANXIETY: ICD-10-CM

## 2019-08-19 DIAGNOSIS — F51.01 PRIMARY INSOMNIA: ICD-10-CM

## 2019-08-19 DIAGNOSIS — R10.84 GENERALIZED ABDOMINAL CRAMPING: Primary | ICD-10-CM

## 2019-08-19 DIAGNOSIS — R19.4 BOWEL HABIT CHANGES: ICD-10-CM

## 2019-08-19 PROCEDURE — 99214 OFFICE O/P EST MOD 30 MIN: CPT | Performed by: FAMILY MEDICINE

## 2019-08-19 RX ORDER — DICYCLOMINE HCL 20 MG
20 TABLET ORAL EVERY 6 HOURS
Qty: 120 TABLET | Refills: 3 | Status: SHIPPED | OUTPATIENT
Start: 2019-08-19 | End: 2020-01-17 | Stop reason: ALTCHOICE

## 2019-08-19 NOTE — PROGRESS NOTES
obstruction. The appendix is   unremarkable. MSK: No acute osseous findings. OTHER: None. Family History   Problem Relation Age of Onset    No Known Problems Mother     Alcohol Abuse Father     Other Father          meningitis, low testosterone    Depression Daughter     ADHD Daughter        Past Surgical History:   Procedure Laterality Date    COLONOSCOPY  2012    tubular adenoma and tubulovillous adenoma.  FOOT SURGERY         Social History     Tobacco Use    Smoking status: Former Smoker     Packs/day: 1.00     Years: 8.00     Pack years: 8.00     Types: Cigarettes     Last attempt to quit: 1990     Years since quittin.6    Smokeless tobacco: Never Used   Substance Use Topics    Alcohol use: Yes     Comment: occasional     Drug use: No       ROS:  No CP, No palpitations,   No sob, No cough,   Yes abd pain, No heartburn,   No headaches,   No tingling, No numbness, No weakness,   + for bowel changes, No hematochezia, No melena,  No bladder changes, No hematuria  No skin rashes, No skin lesions. No vision changes, No hearing changes,   No polyuria, polydipsia, polyphagia.  + for racing thoughts and anxiety. ROS otherwise negative unless as listed in HPI. Chart reviewed and updated where appropriate for PMH, Fam, and Soc Hx. Physical Exam   /80 (Site: Left Upper Arm, Position: Sitting, Cuff Size: Large Adult)   Pulse 85   Temp 98.3 °F (36.8 °C) (Oral)   Ht 6' (1.829 m)   Wt 210 lb (95.3 kg)   SpO2 97%   BMI 28.48 kg/m²    Wt Readings from Last 3 Encounters:   19 210 lb (95.3 kg)   07/15/19 210 lb (95.3 kg)   19 207 lb (93.9 kg)       Constitutional:    He is oriented to person, place, and time. He appears well-developed and well-nourished.    HENT:    Right Ear: Tympanic membrane, external ear and ear canal normal.    Left Ear: Tympanic membrane, external ear and ear canal normal.    Nose: Nose normal.    Mouth/Throat: Oropharynx is clear and moist.   Eyes:    Conjunctivae are normal.    Pupils are equal, round, and reactive to light. EOMI. Neck:    Normal range of motion. No thyromegaly or nodules noted. No bruit. Cardiovascular:    Normal rate, regular rhythm and normal heart sounds. No murmur. No gallop and no friction rub. Pulmonary/Chest:    Effort normal and breath sounds normal.    No wheezes. No rales or rhonchi. Abdominal:    Soft. Bowel sounds are normal. No peritoneal signs    No distension. Mild discomfort to palpation generally. Musculoskeletal:    Normal range of motion. No joint swelling noted. No peripheral edema. Skin:    Skin is warm and dry. No rashes, lesions. Psychiatric:    He has a normal mood and affect. Normal groom and dress. Current Outpatient Medications on File Prior to Visit   Medication Sig Dispense Refill    clonazePAM (KLONOPIN) 0.5 MG tablet Take 1 tablet by mouth nightly as needed (sleep) for up to 30 days. 30 tablet 0    amLODIPine (NORVASC) 2.5 MG tablet Take 1 tablet by mouth daily 90 tablet 1    aspirin 81 MG EC tablet Take 1 tablet by mouth daily 30 tablet 3    atorvastatin (LIPITOR) 40 MG tablet Take 1 tablet by mouth nightly 30 tablet 3    nefazodone (SERZONE) 100 MG tablet TAKE 2 TABLETS IN THE      EVENING AND 1 TABLET IN THEMORNING 270 tablet 0    buPROPion (WELLBUTRIN XL) 150 MG extended release tablet Take 1 tablet by mouth every morning 90 tablet 1    lisinopril (PRINIVIL;ZESTRIL) 40 MG tablet Take 1 tablet by mouth daily 90 tablet 1    testosterone (ANDROGEL; TESTIM) 50 MG/5GM (1%) GEL 1% gel APPLY THE CONTENTS OF 2 TUBES ON MONDAY, WEDNESDAY, AND FRIDAY AND APPLY THE CONTENTS OF 1 TUBE ON THE OTHER DAYS OF THE WEEK 600 g 2     No current facility-administered medications on file prior to visit.         Patient Active Problem List   Diagnosis Code    Moderate episode of recurrent major depressive disorder ([facility] [address] F33.1    Primary insomnia F51.01    Anxiety

## 2019-08-26 ENCOUNTER — TELEPHONE (OUTPATIENT)
Dept: FAMILY MEDICINE CLINIC | Age: 54
End: 2019-08-26

## 2019-08-28 DIAGNOSIS — F41.9 ANXIETY: ICD-10-CM

## 2019-09-10 DIAGNOSIS — F51.01 PRIMARY INSOMNIA: ICD-10-CM

## 2019-09-10 RX ORDER — CLONAZEPAM 0.5 MG/1
0.5 TABLET ORAL NIGHTLY PRN
Qty: 30 TABLET | Refills: 1 | Status: SHIPPED | OUTPATIENT
Start: 2019-09-12 | End: 2019-11-01 | Stop reason: SDUPTHER

## 2019-10-22 ENCOUNTER — OFFICE VISIT (OUTPATIENT)
Dept: FAMILY MEDICINE CLINIC | Age: 54
End: 2019-10-22
Payer: COMMERCIAL

## 2019-10-22 VITALS
HEIGHT: 72 IN | SYSTOLIC BLOOD PRESSURE: 128 MMHG | DIASTOLIC BLOOD PRESSURE: 78 MMHG | HEART RATE: 86 BPM | TEMPERATURE: 97.9 F | BODY MASS INDEX: 29.04 KG/M2 | WEIGHT: 214.4 LBS | OXYGEN SATURATION: 95 %

## 2019-10-22 DIAGNOSIS — M54.2 NECK PAIN: Primary | ICD-10-CM

## 2019-10-22 DIAGNOSIS — R22.1 NECK SWELLING: ICD-10-CM

## 2019-10-22 DIAGNOSIS — I10 HYPERTENSION, UNSPECIFIED TYPE: ICD-10-CM

## 2019-10-22 DIAGNOSIS — F33.1 MODERATE EPISODE OF RECURRENT MAJOR DEPRESSIVE DISORDER (HCC): ICD-10-CM

## 2019-10-22 DIAGNOSIS — E34.9 HYPOTESTOSTERONISM: ICD-10-CM

## 2019-10-22 PROCEDURE — 99214 OFFICE O/P EST MOD 30 MIN: CPT | Performed by: FAMILY MEDICINE

## 2019-10-22 PROCEDURE — 90471 IMMUNIZATION ADMIN: CPT | Performed by: FAMILY MEDICINE

## 2019-10-22 PROCEDURE — 90686 IIV4 VACC NO PRSV 0.5 ML IM: CPT | Performed by: FAMILY MEDICINE

## 2019-10-22 RX ORDER — TESTOSTERONE GEL, 1% 10 MG/G
GEL TRANSDERMAL
Qty: 600 G | Refills: 2 | Status: SHIPPED | OUTPATIENT
Start: 2019-10-22 | End: 2019-12-16 | Stop reason: DRUGHIGH

## 2019-10-22 RX ORDER — LISINOPRIL 40 MG/1
40 TABLET ORAL DAILY
Qty: 90 TABLET | Refills: 0 | Status: SHIPPED
Start: 2019-10-22 | End: 2020-03-01 | Stop reason: SDUPTHER

## 2019-10-22 RX ORDER — BUPROPION HYDROCHLORIDE 150 MG/1
150 TABLET ORAL EVERY MORNING
Qty: 90 TABLET | Refills: 0 | Status: SHIPPED | OUTPATIENT
Start: 2019-10-22 | End: 2019-12-27

## 2019-10-22 RX ORDER — CYCLOBENZAPRINE HCL 5 MG
5 TABLET ORAL 2 TIMES DAILY PRN
Qty: 30 TABLET | Refills: 0 | Status: SHIPPED | OUTPATIENT
Start: 2019-10-22 | End: 2019-11-01

## 2019-10-22 RX ORDER — ATORVASTATIN CALCIUM 40 MG/1
40 TABLET, FILM COATED ORAL NIGHTLY
Qty: 90 TABLET | Refills: 0 | Status: SHIPPED | OUTPATIENT
Start: 2019-10-22 | End: 2020-01-22 | Stop reason: SDUPTHER

## 2019-11-01 DIAGNOSIS — F51.01 PRIMARY INSOMNIA: ICD-10-CM

## 2019-11-04 RX ORDER — CLONAZEPAM 0.5 MG/1
0.5 TABLET ORAL NIGHTLY PRN
Qty: 30 TABLET | Refills: 2 | Status: SHIPPED | OUTPATIENT
Start: 2019-11-07 | End: 2020-01-17 | Stop reason: ALTCHOICE

## 2019-11-05 ENCOUNTER — TELEPHONE (OUTPATIENT)
Dept: FAMILY MEDICINE CLINIC | Age: 54
End: 2019-11-05

## 2019-12-12 ENCOUNTER — HOSPITAL ENCOUNTER (OUTPATIENT)
Age: 54
Discharge: HOME | End: 2019-12-14
Payer: COMMERCIAL

## 2019-12-12 DIAGNOSIS — E34.9 HYPOTESTOSTERONISM: ICD-10-CM

## 2019-12-12 DIAGNOSIS — I10 HYPERTENSION, UNSPECIFIED TYPE: ICD-10-CM

## 2019-12-12 LAB
ALBUMIN SERPL-MCNC: 4.1 G/DL (ref 3.5–5.2)
ALP BLD-CCNC: 62 U/L (ref 40–129)
ALT SERPL-CCNC: 30 U/L (ref 0–40)
ANION GAP SERPL CALCULATED.3IONS-SCNC: 14 MMOL/L (ref 7–16)
AST SERPL-CCNC: 17 U/L (ref 0–39)
BASOPHILS ABSOLUTE: 0.06 E9/L (ref 0–0.2)
BASOPHILS RELATIVE PERCENT: 1 % (ref 0–2)
BILIRUB SERPL-MCNC: 0.3 MG/DL (ref 0–1.2)
BUN BLDV-MCNC: 13 MG/DL (ref 6–20)
CALCIUM SERPL-MCNC: 8.8 MG/DL (ref 8.6–10.2)
CHLORIDE BLD-SCNC: 105 MMOL/L (ref 98–107)
CHOLESTEROL, TOTAL: 136 MG/DL (ref 0–199)
CO2: 23 MMOL/L (ref 22–29)
CREAT SERPL-MCNC: 0.8 MG/DL (ref 0.7–1.2)
EOSINOPHILS ABSOLUTE: 0.26 E9/L (ref 0.05–0.5)
EOSINOPHILS RELATIVE PERCENT: 4.4 % (ref 0–6)
GFR AFRICAN AMERICAN: >60
GFR NON-AFRICAN AMERICAN: >60 ML/MIN/1.73
GLUCOSE BLD-MCNC: 157 MG/DL (ref 74–99)
HCT VFR BLD CALC: 45.4 % (ref 37–54)
HDLC SERPL-MCNC: 50 MG/DL
HEMOGLOBIN: 14.4 G/DL (ref 12.5–16.5)
IMMATURE GRANULOCYTES #: 0.03 E9/L
IMMATURE GRANULOCYTES %: 0.5 % (ref 0–5)
LDL CHOLESTEROL CALCULATED: 54 MG/DL (ref 0–99)
LYMPHOCYTES ABSOLUTE: 1.76 E9/L (ref 1.5–4)
LYMPHOCYTES RELATIVE PERCENT: 29.7 % (ref 20–42)
MCH RBC QN AUTO: 30 PG (ref 26–35)
MCHC RBC AUTO-ENTMCNC: 31.7 % (ref 32–34.5)
MCV RBC AUTO: 94.6 FL (ref 80–99.9)
MONOCYTES ABSOLUTE: 0.44 E9/L (ref 0.1–0.95)
MONOCYTES RELATIVE PERCENT: 7.4 % (ref 2–12)
NEUTROPHILS ABSOLUTE: 3.38 E9/L (ref 1.8–7.3)
NEUTROPHILS RELATIVE PERCENT: 57 % (ref 43–80)
PDW BLD-RTO: 13.5 FL (ref 11.5–15)
PLATELET # BLD: 236 E9/L (ref 130–450)
PMV BLD AUTO: 10.2 FL (ref 7–12)
POTASSIUM SERPL-SCNC: 4 MMOL/L (ref 3.5–5)
RBC # BLD: 4.8 E12/L (ref 3.8–5.8)
SODIUM BLD-SCNC: 142 MMOL/L (ref 132–146)
TESTOSTERONE TOTAL: 514 NG/DL
TOTAL PROTEIN: 7 G/DL (ref 6.4–8.3)
TRIGL SERPL-MCNC: 160 MG/DL (ref 0–149)
VLDLC SERPL CALC-MCNC: 32 MG/DL
WBC # BLD: 5.9 E9/L (ref 4.5–11.5)

## 2019-12-12 PROCEDURE — 82671 ASSAY OF ESTROGENS: CPT

## 2019-12-12 PROCEDURE — 80053 COMPREHEN METABOLIC PANEL: CPT

## 2019-12-12 PROCEDURE — 84403 ASSAY OF TOTAL TESTOSTERONE: CPT

## 2019-12-12 PROCEDURE — 85025 COMPLETE CBC W/AUTO DIFF WBC: CPT

## 2019-12-12 PROCEDURE — 36415 COLL VENOUS BLD VENIPUNCTURE: CPT

## 2019-12-12 PROCEDURE — 80061 LIPID PANEL: CPT

## 2019-12-16 ENCOUNTER — HOSPITAL ENCOUNTER (OUTPATIENT)
Age: 54
Discharge: HOME | End: 2019-12-18
Payer: COMMERCIAL

## 2019-12-16 ENCOUNTER — OFFICE VISIT (OUTPATIENT)
Dept: FAMILY MEDICINE CLINIC | Age: 54
End: 2019-12-16
Payer: COMMERCIAL

## 2019-12-16 VITALS
WEIGHT: 217 LBS | HEIGHT: 72 IN | TEMPERATURE: 96.7 F | DIASTOLIC BLOOD PRESSURE: 82 MMHG | OXYGEN SATURATION: 97 % | SYSTOLIC BLOOD PRESSURE: 136 MMHG | HEART RATE: 73 BPM | BODY MASS INDEX: 29.39 KG/M2

## 2019-12-16 DIAGNOSIS — M54.2 NECK PAIN: Primary | ICD-10-CM

## 2019-12-16 DIAGNOSIS — R19.4 BOWEL HABIT CHANGES: ICD-10-CM

## 2019-12-16 DIAGNOSIS — R60.0 SALIVARY GLAND SWELLING: ICD-10-CM

## 2019-12-16 DIAGNOSIS — Z12.11 COLON CANCER SCREENING: ICD-10-CM

## 2019-12-16 DIAGNOSIS — E34.9 HYPOTESTOSTERONISM: ICD-10-CM

## 2019-12-16 LAB
ESTRADIOL LEVEL: 31.6 PG/ML (ref 10–42)
ESTROGEN TOTAL: 59.6 PG/ML (ref 19–69)
ESTRONE: 28 PG/ML (ref 9–36)

## 2019-12-16 PROCEDURE — 86431 RHEUMATOID FACTOR QUANT: CPT

## 2019-12-16 PROCEDURE — 36415 COLL VENOUS BLD VENIPUNCTURE: CPT

## 2019-12-16 PROCEDURE — 86235 NUCLEAR ANTIGEN ANTIBODY: CPT

## 2019-12-16 PROCEDURE — 85651 RBC SED RATE NONAUTOMATED: CPT

## 2019-12-16 PROCEDURE — 86140 C-REACTIVE PROTEIN: CPT

## 2019-12-16 PROCEDURE — 86038 ANTINUCLEAR ANTIBODIES: CPT

## 2019-12-16 PROCEDURE — 99214 OFFICE O/P EST MOD 30 MIN: CPT | Performed by: FAMILY MEDICINE

## 2019-12-16 RX ORDER — TESTOSTERONE GEL, 1% 10 MG/G
GEL TRANSDERMAL
Qty: 600 G | Refills: 2 | Status: SHIPPED
Start: 2019-12-16 | End: 2020-01-22 | Stop reason: SDUPTHER

## 2019-12-17 LAB
ANTI-NUCLEAR ANTIBODY (ANA): NEGATIVE
C-REACTIVE PROTEIN: <0.1 MG/DL (ref 0–0.4)
RHEUMATOID FACTOR: <10 IU/ML (ref 0–13)
SEDIMENTATION RATE, ERYTHROCYTE: 0 MM/HR (ref 0–15)

## 2019-12-18 LAB
ENA TO SSA (RO) ANTIBODY: NEGATIVE
ENA TO SSB (LA) ANTIBODY: NEGATIVE

## 2019-12-27 RX ORDER — BUPROPION HYDROCHLORIDE 150 MG/1
150 TABLET, EXTENDED RELEASE ORAL 2 TIMES DAILY
Qty: 60 TABLET | Refills: 0 | Status: SHIPPED
Start: 2019-12-27 | End: 2020-02-04 | Stop reason: SDUPTHER

## 2020-01-02 ENCOUNTER — PREP FOR PROCEDURE (OUTPATIENT)
Dept: SURGERY | Age: 55
End: 2020-01-02

## 2020-01-02 ENCOUNTER — OFFICE VISIT (OUTPATIENT)
Dept: SURGERY | Age: 55
End: 2020-01-02
Payer: COMMERCIAL

## 2020-01-02 VITALS
TEMPERATURE: 97.5 F | DIASTOLIC BLOOD PRESSURE: 85 MMHG | OXYGEN SATURATION: 94 % | BODY MASS INDEX: 29.12 KG/M2 | HEART RATE: 108 BPM | HEIGHT: 72 IN | WEIGHT: 215 LBS | SYSTOLIC BLOOD PRESSURE: 132 MMHG | RESPIRATION RATE: 20 BRPM

## 2020-01-02 PROCEDURE — 99243 OFF/OP CNSLTJ NEW/EST LOW 30: CPT | Performed by: SURGERY

## 2020-01-02 RX ORDER — SODIUM CHLORIDE 9 MG/ML
INJECTION, SOLUTION INTRAVENOUS CONTINUOUS
Status: CANCELLED | OUTPATIENT
Start: 2020-01-02

## 2020-01-02 NOTE — LETTER
[facility] [place] [facility]  [address] [place] [address]  [id number]  Phone: [phone]  Fax: [phone]    Tiffany Allan MD        January 2, 2020       Patient: Alissa Lim   MR Number: [id number]   YOB: 1965   Date of Visit: 1/2/2020       Dear Dr. Traore Epp: Thank you for the request for consultation for Rod Jamil to me for the evaluation of his colon. Below are the relevant portions of my assessment and plan of care. ASSESSMENT AND PLAN:       Assessment: Alissa Lim is an 48 y.o. male who presents for a colonoscopy with LLQ pain and diarrhea    Plan: I will set the patient up for a colonoscopy, possible biopsy, possible polypectomy. If you have questions, please do not hesitate to call me. I look forward to following Marybel Leonardo along with you. Sincerely,        Ritika Roche MD    CC providers: MD Nataliya Wolf [address]  [address] [facility]

## 2020-01-02 NOTE — PROGRESS NOTES
General Surgery History and Physical    Patient's Name/Date of Birth: Cl Persaud / 1965    Date: 1/2/2020    PCP: Darline Sommer MD    Referring Physician:   Yessy Carcamo MD  [phone]    CHIEF COMPLAINT:    Chief Complaint   Patient presents with    Colon Cancer Screening     PREV COLONOSCOPY SEVEN YEARS AGO WITH POLYPS, NO FM HX OR PERSONAL HX OF COLON CANCER. HISTORY OF PRESENT ILLNESS:    Cl Persaud is an 48 y.o. male who presents for a colonoscopy. The patient said he has left sided pain and gurgling. This has been going on for about 7 or 8 months. He said he has diarrhea intermittently. He said he also has LLQ pain. His symptoms have been getting worse. No nausea, vomiting, constipation. No changes in stool caliber. No bloody or black stools. No unintentional weight loss. No family history of colon cancer. The patient has a known history of: colon polyps. The patient has had a colonoscopy before - 7 years ago he said he had polyps. Past Medical History:   Past Medical History:   Diagnosis Date    Anxiety     Depression     HTN (hypertension)     Hypertriglyceridemia     Hypogonadotropic hypogonadism (HCC)     Insomnia     ZIA (obstructive sleep apnea)     Overweight         Past Surgical History:   Past Surgical History:   Procedure Laterality Date    COLONOSCOPY  04/05/2012    tubular adenoma and tubulovillous adenoma.      FOOT SURGERY      TONSILLECTOMY  1972        Allergies: Codeine and Hctz [hydrochlorothiazide]     Medications:   Current Outpatient Medications   Medication Sig Dispense Refill    buPROPion (WELLBUTRIN SR) 150 MG extended release tablet Take 1 tablet by mouth 2 times daily 60 tablet 0    testosterone (ANDROGEL; TESTIM) 50 MG/5GM (1%) GEL 1% gel APPLY THE CONTENTS OF 1 TUBE ON MONDAY, WEDNESDAY, AND FRIDAY AND APPLY THE CONTENTS OF 2 TUBES ON THE OTHER DAYS OF THE WEEK 600 g 2    clonazePAM (KLONOPIN) 0.5 MG tablet Take 1 tablet by non-tender; bowel sounds normal; no masses,  no organomegaly  Skin: No skin abnormalities  Neurologic: Alert and oriented x 3. Grossly normal  Musculoskeletal: No clubbing cyanosis or edema. ASSESSMENT AND PLAN:       Assessment: Stephanie Garcia is an 48 y.o. male who presents for a colonoscopy with LLQ pain and diarrhea    Plan: I will set the patient up for a colonoscopy, possible biopsy, possible polypectomy. I explained the risks including but not limited to bleeding, perforation leading to possible surgery, or infection. The benefits, alternatives, and potential complications associated with the above procedure to be performed and transfusions when applicable with the patient/responsible person prior to the procedure. I discussed the risk of bowel peroration, postoperative bleeding, post-polypectomy syndrome, as well as the possibility of needing emergency surgery or another colonoscopy. All of the patient's questions were answered. The patient understands and agrees to the procedure.      Physician Signature: Electronically signed by Alexander Solis MD, General Surgery    Send copy of H&P to PCP, Lanning Mortimer, MD and referring physician, Abby Kennedy MD

## 2020-01-02 NOTE — PATIENT INSTRUCTIONS
and grape-flavored ice pops. It also includes fruit punch and cherry gelatin. · Drink the \"colon prep\" liquid as your doctor tells you. You will want to stay home, because the liquid will make you go to the bathroom a lot. Your stools will be loose and watery. It is very important to drink all of the liquid. If you have problems drinking it, call your doctor. Some doctors may have you take a tablet rather than drink a liquid. · Do not eat any solid foods after you drink the colon prep. · Stop drinking clear liquids 6 to 8 hours before the test.  What happens on the day of the procedure? · Follow the instructions exactly about when to stop eating and drinking. If you don't, your procedure may be canceled. If your doctor told you to take your medicines on the day of the procedure, take them with only a sip of water. · Take a bath or shower before you come in for your procedure. Do not apply lotions, perfumes, deodorants, or nail polish. · Take off all jewelry and piercings. And take out contact lenses, if you wear them. At the [address] [place] [address] or hospital  · Bring a picture ID. · You will be kept comfortable and safe by your anesthesia provider. The anesthesia may make you sleep. · You will lie on your back or your side with your knees drawn up toward your belly. The doctor will gently put a gloved finger into your anus. Then the doctor puts the scope in and moves it into your colon. The scope goes in easily because it is lubricated. · The doctor may also use small tools to take tissue samples for a biopsy or to remove polyps. This does not hurt. · The test usually takes 30 to 45 minutes. But it may take longer. It depends on what is found and what is done. Going home  · Be sure you have someone to drive you home. Anesthesia and pain medicine make it unsafe for you to drive. · You will be given more specific instructions about recovering from your procedure. When should you call your doctor?   · You have

## 2020-01-15 NOTE — PROGRESS NOTES
MA spoke with [facility] with Akash Valladares ref number [id number] and no prior auth is required for colonoscopy.    Electronically signed by Robert Cruz MA on 1/15/2020 at 2:47 PM

## 2020-01-17 RX ORDER — CLONAZEPAM 0.5 MG/1
0.5 TABLET ORAL NIGHTLY PRN
COMMUNITY
End: 2020-01-23 | Stop reason: SDUPTHER

## 2020-01-17 RX ORDER — M-VIT,TX,IRON,MINS/CALC/FOLIC 27MG-0.4MG
1 TABLET ORAL DAILY
COMMUNITY

## 2020-01-17 RX ORDER — MULTIVIT-MIN/IRON/FOLIC ACID/K 18-600-40
CAPSULE ORAL
COMMUNITY
End: 2020-10-20

## 2020-01-22 NOTE — TELEPHONE ENCOUNTER
Last Appointment:  12/16/2019  Future Appointments   Date Time Provider Rashel Gongora   2/17/2020  2:40 PM Lory Griffiths  [address]

## 2020-01-23 RX ORDER — ATORVASTATIN CALCIUM 40 MG/1
40 TABLET, FILM COATED ORAL NIGHTLY
Qty: 90 TABLET | Refills: 1 | Status: SHIPPED
Start: 2020-01-23 | End: 2020-04-21 | Stop reason: SDUPTHER

## 2020-01-23 RX ORDER — CLONAZEPAM 0.5 MG/1
0.5 TABLET ORAL NIGHTLY PRN
Qty: 30 TABLET | Refills: 2 | Status: SHIPPED
Start: 2020-01-23 | End: 2020-04-19 | Stop reason: SDUPTHER

## 2020-01-23 RX ORDER — TESTOSTERONE GEL, 1% 10 MG/G
GEL TRANSDERMAL
Qty: 600 G | Refills: 2 | Status: SHIPPED
Start: 2020-01-23 | End: 2020-04-21 | Stop reason: SDUPTHER

## 2020-01-27 ENCOUNTER — HOSPITAL ENCOUNTER (OUTPATIENT)
Age: 55
Setting detail: OUTPATIENT SURGERY
Discharge: HOME | End: 2020-01-27
Attending: SURGERY | Admitting: SURGERY
Payer: COMMERCIAL

## 2020-01-27 ENCOUNTER — ANESTHESIA EVENT (OUTPATIENT)
Dept: ENDOSCOPY | Age: 55
End: 2020-01-27
Payer: COMMERCIAL

## 2020-01-27 ENCOUNTER — ANESTHESIA (OUTPATIENT)
Dept: ENDOSCOPY | Age: 55
End: 2020-01-27
Payer: COMMERCIAL

## 2020-01-27 VITALS
RESPIRATION RATE: 19 BRPM | BODY MASS INDEX: 29.12 KG/M2 | HEART RATE: 75 BPM | DIASTOLIC BLOOD PRESSURE: 69 MMHG | SYSTOLIC BLOOD PRESSURE: 121 MMHG | OXYGEN SATURATION: 95 % | HEIGHT: 72 IN | WEIGHT: 215 LBS

## 2020-01-27 VITALS
SYSTOLIC BLOOD PRESSURE: 102 MMHG | OXYGEN SATURATION: 95 % | DIASTOLIC BLOOD PRESSURE: 59 MMHG | RESPIRATION RATE: 17 BRPM

## 2020-01-27 PROCEDURE — 45378 DIAGNOSTIC COLONOSCOPY: CPT | Performed by: SURGERY

## 2020-01-27 PROCEDURE — 3700000001 HC ADD 15 MINUTES (ANESTHESIA): Performed by: SURGERY

## 2020-01-27 PROCEDURE — 6360000002 HC RX W HCPCS: Performed by: NURSE ANESTHETIST, CERTIFIED REGISTERED

## 2020-01-27 PROCEDURE — 2709999900 HC NON-CHARGEABLE SUPPLY: Performed by: SURGERY

## 2020-01-27 PROCEDURE — 7100000011 HC PHASE II RECOVERY - ADDTL 15 MIN: Performed by: SURGERY

## 2020-01-27 PROCEDURE — 2580000003 HC RX 258: Performed by: NURSE ANESTHETIST, CERTIFIED REGISTERED

## 2020-01-27 PROCEDURE — 3700000000 HC ANESTHESIA ATTENDED CARE: Performed by: SURGERY

## 2020-01-27 PROCEDURE — 7100000010 HC PHASE II RECOVERY - FIRST 15 MIN: Performed by: SURGERY

## 2020-01-27 PROCEDURE — 3609027000 HC COLONOSCOPY: Performed by: SURGERY

## 2020-01-27 RX ORDER — PROPOFOL 10 MG/ML
INJECTION, EMULSION INTRAVENOUS PRN
Status: DISCONTINUED | OUTPATIENT
Start: 2020-01-27 | End: 2020-01-27 | Stop reason: SDUPTHER

## 2020-01-27 RX ORDER — SODIUM CHLORIDE 9 MG/ML
INJECTION, SOLUTION INTRAVENOUS CONTINUOUS
Status: DISCONTINUED | OUTPATIENT
Start: 2020-01-27 | End: 2020-01-27 | Stop reason: HOSPADM

## 2020-01-27 RX ORDER — SODIUM CHLORIDE 9 MG/ML
INJECTION, SOLUTION INTRAVENOUS CONTINUOUS PRN
Status: DISCONTINUED | OUTPATIENT
Start: 2020-01-27 | End: 2020-01-27 | Stop reason: SDUPTHER

## 2020-01-27 RX ADMIN — SODIUM CHLORIDE: 9 INJECTION, SOLUTION INTRAVENOUS at 10:12

## 2020-01-27 RX ADMIN — PROPOFOL 400 MG: 10 INJECTION, EMULSION INTRAVENOUS at 10:41

## 2020-01-27 NOTE — ANESTHESIA PRE PROCEDURE
Current Facility-Administered Medications   Medication Dose Route Frequency Provider Last Rate Last Dose    0.9 % sodium chloride infusion   Intravenous Continuous Karl Toney MD           Allergies: Allergies   Allergen Reactions    Codeine Other (See Comments)     Hallucinations and sweating    Hctz [Hydrochlorothiazide]      Causes fatigue, angina. Problem List:    Patient Active Problem List   Diagnosis Code    Moderate episode of recurrent major depressive disorder (HCC) F33.1    Primary insomnia F51.01    Anxiety F41.9    Hypogonadotropic hypogonadism (HCC) E23.0    Hypertriglyceridemia E78.1    HTN (hypertension) I10    ZIA (obstructive sleep apnea) G47.33    Overweight E66.3    Insomnia G47.00    TIA (transient ischemic attack) G45.9       Past Medical History:        Diagnosis Date    Anxiety     Depression     Diarrhea     for OR 20     HTN (hypertension)     Hypertriglyceridemia     Hypogonadotropic hypogonadism (HCC)     Insomnia     ZIA (obstructive sleep apnea)     no CPAP     Overweight        Past Surgical History:        Procedure Laterality Date    COLONOSCOPY  2012    tubular adenoma and tubulovillous adenoma.      CYST REMOVAL      back      FOOT SURGERY      left     KNEE ARTHROSCOPY      left     TONSILLECTOMY  1972       Social History:    Social History     Tobacco Use    Smoking status: Former Smoker     Packs/day: 1.00     Years: 8.00     Pack years: 8.00     Types: Cigarettes     Last attempt to quit:      Years since quittin.0    Smokeless tobacco: Never Used   Substance Use Topics    Alcohol use: Yes     Comment: occasional                                 Counseling given: Not Answered      Vital Signs (Current):   Vitals:    20 1401 20 0930   BP:  123/83   Pulse:  70   Resp:  16   SpO2:  94%   Weight: 215 lb (97.5 kg)    Height: 6' (1.829 m)                                               BP Readings from Last 3 Encounters:   01/27/20 123/83   01/02/20 132/85   12/16/19 136/82       NPO Status: Time of last liquid consumption: 0900                        Time of last solid consumption: 0000                        Date of last liquid consumption: 01/26/20                        Date of last solid food consumption: 01/26/20    BMI:   Wt Readings from Last 3 Encounters:   01/17/20 215 lb (97.5 kg)   01/02/20 215 lb (97.5 kg)   12/16/19 217 lb (98.4 kg)     Body mass index is 29.16 kg/m². CBC:   Lab Results   Component Value Date    WBC 5.9 12/12/2019    RBC 4.80 12/12/2019    HGB 14.4 12/12/2019    HCT 45.4 12/12/2019    MCV 94.6 12/12/2019    RDW 13.5 12/12/2019     12/12/2019       CMP:   Lab Results   Component Value Date     12/12/2019    K 4.0 12/12/2019     12/12/2019    CO2 23 12/12/2019    BUN 13 12/12/2019    CREATININE 0.8 12/12/2019    GFRAA >60 12/12/2019    LABGLOM >60 12/12/2019    LABGLOM >60 08/24/2018    GLUCOSE 157 12/12/2019    GLUCOSE 1+ 08/24/2018    PROT 7.0 12/12/2019    CALCIUM 8.8 12/12/2019    BILITOT 0.3 12/12/2019    BILITOT NEGATIVE 08/24/2018    ALKPHOS 62 12/12/2019    AST 17 12/12/2019    ALT 30 12/12/2019       POC Tests: No results for input(s): POCGLU, POCNA, POCK, POCCL, POCBUN, POCHEMO, POCHCT in the last 72 hours.     Coags: No results found for: PROTIME, INR, APTT    HCG (If Applicable): No results found for: PREGTESTUR, PREGSERUM, HCG, HCGQUANT     ABGs: No results found for: PHART, PO2ART, JPJ2VKI, SHW0TSN, BEART, C1EGXVSU     Type & Screen (If Applicable):  No results found for: [facility]    Anesthesia Evaluation  Patient summary reviewed and Nursing notes reviewed no history of anesthetic complications:   Airway: Mallampati: II  TM distance: >3 FB   Neck ROM: full   Dental: normal exam         Pulmonary: breath sounds clear to auscultation  (+) sleep apnea: on CPAP and noncompliant,                             Cardiovascular:    (+) hypertension:, Rhythm: regular  Rate: normal                    Neuro/Psych:   (+) TIA, psychiatric history:            GI/Hepatic/Renal: Neg GI/Hepatic/Renal ROS            Endo/Other: Negative Endo/Other ROS                    Abdominal:           Vascular: negative vascular ROS. Anesthesia Plan      MAC     ASA 3       Induction: intravenous. Anesthetic plan and risks discussed with patient.       Plan discussed with CRNA and surgical team.                Naomie Mendez MD   1/27/2020

## 2020-01-27 NOTE — H&P
Patient's office history and physical was reviewed. Patient examined. There has been no change in the patient's history and physical.      Physician Signature: Electronically signed by Dr. Karey Jaffe Surgery History and Physical    Patient's Name/Date of Birth: Oj Person / 1965    Date: 1/3/2020    PCP: Ashley Biggs MD    Referring Physician:   Herold Essex, MD  [phone]    CHIEF COMPLAINT:    No chief complaint on file. HISTORY OF PRESENT ILLNESS:    Oj Person is an 48 y.o. male who presents for a colonoscopy. The patient said he has left sided pain and gurgling. This has been going on for about 7 or 8 months. He said he has diarrhea intermittently. He said he also has LLQ pain. His symptoms have been getting worse. No nausea, vomiting, constipation. No changes in stool caliber. No bloody or black stools. No unintentional weight loss. No family history of colon cancer. The patient has a known history of: colon polyps. The patient has had a colonoscopy before - 7 years ago he said he had polyps. Past Medical History:   Past Medical History:   Diagnosis Date    Anxiety     Depression     Diarrhea     for OR 1-27-20     HTN (hypertension)     Hypertriglyceridemia     Hypogonadotropic hypogonadism (HCC)     Insomnia     ZIA (obstructive sleep apnea)     no CPAP     Overweight         Past Surgical History:   Past Surgical History:   Procedure Laterality Date    COLONOSCOPY  04/05/2012    tubular adenoma and tubulovillous adenoma.      CYST REMOVAL      back 2013     FOOT SURGERY      left     KNEE ARTHROSCOPY      left     TONSILLECTOMY  1972        Allergies: Codeine and Hctz [hydrochlorothiazide]     Medications:   Current Facility-Administered Medications   Medication Dose Route Frequency Provider Last Rate Last Dose    0.9 % sodium chloride infusion   Intravenous Continuous Trupti Botello MD             Social History: discussed the risk of bowel peroration, postoperative bleeding, post-polypectomy syndrome, as well as the possibility of needing emergency surgery or another colonoscopy. All of the patient's questions were answered. The patient understands and agrees to the procedure.      Physician Signature: Electronically signed by Kymberly Kurtz MD, General Surgery    Send copy of H&P to PCP, Nunu Da Silva MD and referring physician, Enzo Stroud MD

## 2020-01-28 ENCOUNTER — TELEPHONE (OUTPATIENT)
Dept: FAMILY MEDICINE CLINIC | Age: 55
End: 2020-01-28

## 2020-01-28 NOTE — TELEPHONE ENCOUNTER
Shannon from [facility] calling to verify the directions on testosterone sent in 01/23/2020. What days is he applying one or 2 tubes? This is the final attempt for clarification. They have placed rx on hold. Sig:apply the contents of 1 tube on mon,wed and fri and apply the contents of 2 tubes on the other days of the week. Note to pharmacy: apply the contents of 2 tubes on mon,wed and fri and apply the contents of 1 tube on the other days of the week.

## 2020-01-29 ENCOUNTER — TELEPHONE (OUTPATIENT)
Dept: FAMILY MEDICINE CLINIC | Age: 55
End: 2020-01-29

## 2020-01-29 NOTE — TELEPHONE ENCOUNTER
Sent PA via [employer] 1/29/20 for testosterone gel Key: [id number] - PA Case ID: [id number]. Waiting on reply. Prior auth approved. 2/3/20 approval letter scanned into chart.

## 2020-02-05 RX ORDER — BUPROPION HYDROCHLORIDE 150 MG/1
150 TABLET, EXTENDED RELEASE ORAL 2 TIMES DAILY
Qty: 180 TABLET | Refills: 1 | Status: SHIPPED
Start: 2020-02-05 | End: 2020-07-30 | Stop reason: SDUPTHER

## 2020-02-05 NOTE — TELEPHONE ENCOUNTER
Last Appointment:  12/16/2019  Future Appointments   Date Time Provider Rashel Gongora   2/17/2020  2:40 PM Misty Matt  [address]

## 2020-03-02 RX ORDER — LISINOPRIL 40 MG/1
40 TABLET ORAL DAILY
Qty: 90 TABLET | Refills: 1 | Status: SHIPPED
Start: 2020-03-02 | End: 2020-03-16

## 2020-03-16 ENCOUNTER — OFFICE VISIT (OUTPATIENT)
Dept: FAMILY MEDICINE CLINIC | Age: 55
End: 2020-03-16
Payer: COMMERCIAL

## 2020-03-16 VITALS
BODY MASS INDEX: 30.75 KG/M2 | TEMPERATURE: 98.2 F | SYSTOLIC BLOOD PRESSURE: 148 MMHG | WEIGHT: 227 LBS | OXYGEN SATURATION: 98 % | HEART RATE: 75 BPM | HEIGHT: 72 IN | DIASTOLIC BLOOD PRESSURE: 94 MMHG

## 2020-03-16 PROCEDURE — 99214 OFFICE O/P EST MOD 30 MIN: CPT | Performed by: FAMILY MEDICINE

## 2020-03-16 RX ORDER — LOSARTAN POTASSIUM 25 MG/1
25 TABLET ORAL DAILY
Qty: 30 TABLET | Refills: 0 | Status: SHIPPED
Start: 2020-03-16 | End: 2020-03-26

## 2020-03-16 NOTE — PROGRESS NOTES
[facility]  Office Progress Note - Dr. Wellington Drivers  3/16/20    Chief Complaint   Patient presents with    Hypertension        S:   Hypertension  Follow-up  Blood pressure is not controlled today. BP Readings from Last 3 Encounters:   03/16/20 (!) 148/94   01/27/20 121/69   01/27/20 (!) 102/59     Patient continues SOME medications regularly. Compliance is fair. Denies CP, sob, abd pain, headaches, vision changes, dizziness, hypotensive symptoms. No side effects from medications noted. He chose to stop lisinopril, see below. Chronic insomnia  Had been good for a while but worsened recently. He ended up deciding to stop his lisinopril and after doing that, he did start sleeping better. Much improved. Slept the whole night through for a few nights. Recognizes need to control BP. Has tried a lot of different meds, feels like he has unusual side effects. Willing to try another. Denies CP, sob, abd pain, headaches, vision changes. Continues benxo low dose, nefazodone nightly for sleep and mood. Mood fair recently. Always worse through the winter. Has gained some weight. Is feeling motivated to work on this. Starting to walk more again. Considering looking for a new job. Family History   Problem Relation Age of Onset    No Known Problems Mother     Alcohol Abuse Father     Other Father          meningitis, low testosterone    Depression Daughter     ADHD Daughter        Past Surgical History:   Procedure Laterality Date    COLONOSCOPY  04/05/2012    tubular adenoma and tubulovillous adenoma.      COLONOSCOPY N/A 1/27/2020    COLORECTAL CANCER SCREENING, NOT HIGH RISK performed by Suellen Figueroa MD at [facility] 2013     FOOT SURGERY      left     KNEE ARTHROSCOPY      left     TONSILLECTOMY  1972       Social History     Tobacco Use    Smoking status: Former Smoker     Packs/day: 1.00     Years: 8.00     Pack years: 8.00 Refill    nefazodone (SERZONE) 100 MG tablet TAKE 2 TABLETS IN THE      EVENING AND 1 TABLET IN THEMORNING 270 tablet 1    buPROPion (WELLBUTRIN SR) 150 MG extended release tablet Take 1 tablet by mouth 2 times daily 180 tablet 1    atorvastatin (LIPITOR) 40 MG tablet Take 1 tablet by mouth nightly 90 tablet 1    testosterone (ANDROGEL; TESTIM) 50 MG/5GM (1%) GEL 1% gel APPLY THE CONTENTS OF 1 TUBE ON MONDAY, WEDNESDAY, AND FRIDAY AND APPLY THE CONTENTS OF 2 TUBES ON THE OTHER DAYS OF THE WEEK 600 g 2    clonazePAM (KLONOPIN) 0.5 MG tablet Take 1 tablet by mouth nightly as needed (sleep) for up to 90 days. 30 tablet 2    Multiple Vitamins-Minerals (THERAPEUTIC MULTIVITAMIN-MINERALS) tablet Take 1 tablet by mouth daily LD 1-18-20      Cholecalciferol (VITAMIN D) 50 MCG (2000 UT) CAPS capsule Take by mouth LD 1-18-20      aspirin 81 MG EC tablet Take 1 tablet by mouth daily (Patient taking differently: Take 81 mg by mouth daily LD 1/17/20) 30 tablet 3     No current facility-administered medications on file prior to visit. Patient Active Problem List   Diagnosis Code    Moderate episode of recurrent major depressive disorder ([facility] [address] F33.1    Primary insomnia F51.01    Anxiety F41.9    Hypogonadotropic hypogonadism ([facility] E23.0    Hypertriglyceridemia E78.1    HTN (hypertension) I10    ZIA (obstructive sleep apnea) G47.33    Overweight E66.3    Insomnia G47.00    TIA (transient ischemic attack) G45.9       Anne / Carollee Castleman was seen today for hypertension. Diagnoses and all orders for this visit:    Hypertension, unspecified type, worsened  - START    losartan (COZAAR) 25 MG tablet; Take 1 tablet by mouth daily  Continue other meds as above. Check pressure a few times a week. Call if regularly seeing >140/90    Primary insomnia  Improved with cessation of lisinopril.      Anxiety    Moderate episode of recurrent major depressive disorder (HCC)    Stable to improved recently on current unchanged med regimen. [address] [employer] and CAST[employer] activity seems to be helping. Also considering trying new occupation when [address] down. RTO 3 mos sooner as needed. Patient counseled to follow up sooner or seek more acute care if symptoms worsening. Electronically signed by Ponce Crews MD on 3/17/2020    This note may have been created using dictation software.  Efforts were made to reduce grammatical or syntax errors, but some may persist.

## 2020-03-19 ENCOUNTER — PATIENT MESSAGE (OUTPATIENT)
Dept: FAMILY MEDICINE CLINIC | Age: 55
End: 2020-03-19

## 2020-03-26 RX ORDER — LOSARTAN POTASSIUM 100 MG/1
100 TABLET ORAL DAILY
Qty: 30 TABLET | Refills: 0 | Status: SHIPPED
Start: 2020-03-26 | End: 2020-04-21

## 2020-04-20 ENCOUNTER — PATIENT MESSAGE (OUTPATIENT)
Dept: FAMILY MEDICINE CLINIC | Age: 55
End: 2020-04-20

## 2020-04-21 RX ORDER — CLONAZEPAM 0.5 MG/1
0.5 TABLET ORAL NIGHTLY PRN
Qty: 30 TABLET | Refills: 2 | Status: SHIPPED
Start: 2020-04-21 | End: 2020-06-12

## 2020-04-21 RX ORDER — TESTOSTERONE GEL, 1% 10 MG/G
GEL TRANSDERMAL
Qty: 600 G | Refills: 2 | Status: SHIPPED
Start: 2020-04-21 | End: 2020-08-15 | Stop reason: SDUPTHER

## 2020-04-21 RX ORDER — ATORVASTATIN CALCIUM 40 MG/1
40 TABLET, FILM COATED ORAL NIGHTLY
Qty: 90 TABLET | Refills: 1 | Status: SHIPPED
Start: 2020-04-21 | End: 2020-07-06 | Stop reason: SDUPTHER

## 2020-04-21 RX ORDER — DILTIAZEM HYDROCHLORIDE 180 MG/1
180 CAPSULE, COATED, EXTENDED RELEASE ORAL DAILY
Qty: 30 CAPSULE | Refills: 0 | Status: SHIPPED
Start: 2020-04-21 | End: 2020-05-11 | Stop reason: SDUPTHER

## 2020-05-11 RX ORDER — DILTIAZEM HYDROCHLORIDE 180 MG/1
180 CAPSULE, COATED, EXTENDED RELEASE ORAL DAILY
Qty: 30 CAPSULE | Refills: 5 | Status: SHIPPED
Start: 2020-05-11 | End: 2020-07-30 | Stop reason: SDUPTHER

## 2020-06-10 ENCOUNTER — OFFICE VISIT (OUTPATIENT)
Dept: FAMILY MEDICINE CLINIC | Age: 55
End: 2020-06-10
Payer: COMMERCIAL

## 2020-06-10 ENCOUNTER — HOSPITAL ENCOUNTER (OUTPATIENT)
Age: 55
Discharge: HOME | End: 2020-06-12
Payer: COMMERCIAL

## 2020-06-10 VITALS
WEIGHT: 228 LBS | HEIGHT: 72 IN | HEART RATE: 89 BPM | DIASTOLIC BLOOD PRESSURE: 80 MMHG | BODY MASS INDEX: 30.88 KG/M2 | OXYGEN SATURATION: 94 % | TEMPERATURE: 98.8 F | SYSTOLIC BLOOD PRESSURE: 134 MMHG

## 2020-06-10 LAB
ALBUMIN SERPL-MCNC: 4.4 G/DL (ref 3.5–5.2)
ALP BLD-CCNC: 56 U/L (ref 40–129)
ALT SERPL-CCNC: 28 U/L (ref 0–40)
ANION GAP SERPL CALCULATED.3IONS-SCNC: 18 MMOL/L (ref 7–16)
AST SERPL-CCNC: 23 U/L (ref 0–39)
BASOPHILS ABSOLUTE: 0.04 E9/L (ref 0–0.2)
BASOPHILS RELATIVE PERCENT: 0.5 % (ref 0–2)
BILIRUB SERPL-MCNC: 0.4 MG/DL (ref 0–1.2)
BUN BLDV-MCNC: 12 MG/DL (ref 6–20)
CALCIUM SERPL-MCNC: 9 MG/DL (ref 8.6–10.2)
CHLORIDE BLD-SCNC: 99 MMOL/L (ref 98–107)
CO2: 22 MMOL/L (ref 22–29)
CREAT SERPL-MCNC: 0.9 MG/DL (ref 0.7–1.2)
EOSINOPHILS ABSOLUTE: 0.04 E9/L (ref 0.05–0.5)
EOSINOPHILS RELATIVE PERCENT: 0.5 % (ref 0–6)
GFR AFRICAN AMERICAN: >60
GFR NON-AFRICAN AMERICAN: >60 ML/MIN/1.73
GLUCOSE BLD-MCNC: 123 MG/DL (ref 74–99)
HCT VFR BLD CALC: 46.4 % (ref 37–54)
HEMOGLOBIN: 15.2 G/DL (ref 12.5–16.5)
IMMATURE GRANULOCYTES #: 0.03 E9/L
IMMATURE GRANULOCYTES %: 0.4 % (ref 0–5)
LYMPHOCYTES ABSOLUTE: 1.46 E9/L (ref 1.5–4)
LYMPHOCYTES RELATIVE PERCENT: 20 % (ref 20–42)
MAGNESIUM: 2.5 MG/DL (ref 1.6–2.6)
MCH RBC QN AUTO: 31.2 PG (ref 26–35)
MCHC RBC AUTO-ENTMCNC: 32.8 % (ref 32–34.5)
MCV RBC AUTO: 95.3 FL (ref 80–99.9)
MONOCYTES ABSOLUTE: 0.77 E9/L (ref 0.1–0.95)
MONOCYTES RELATIVE PERCENT: 10.5 % (ref 2–12)
NEUTROPHILS ABSOLUTE: 4.96 E9/L (ref 1.8–7.3)
NEUTROPHILS RELATIVE PERCENT: 68.1 % (ref 43–80)
PDW BLD-RTO: 14.1 FL (ref 11.5–15)
PLATELET # BLD: 252 E9/L (ref 130–450)
PMV BLD AUTO: 10.4 FL (ref 7–12)
POTASSIUM SERPL-SCNC: 4.1 MMOL/L (ref 3.5–5)
RBC # BLD: 4.87 E12/L (ref 3.8–5.8)
SODIUM BLD-SCNC: 139 MMOL/L (ref 132–146)
TESTOSTERONE TOTAL: 794.8 NG/DL
TOTAL CK: 320 U/L (ref 20–200)
TOTAL PROTEIN: 7.6 G/DL (ref 6.4–8.3)
TSH SERPL DL<=0.05 MIU/L-ACNC: 2.23 UIU/ML (ref 0.27–4.2)
WBC # BLD: 7.3 E9/L (ref 4.5–11.5)

## 2020-06-10 PROCEDURE — 80053 COMPREHEN METABOLIC PANEL: CPT

## 2020-06-10 PROCEDURE — 84630 ASSAY OF ZINC: CPT

## 2020-06-10 PROCEDURE — 99215 OFFICE O/P EST HI 40 MIN: CPT | Performed by: FAMILY MEDICINE

## 2020-06-10 PROCEDURE — 84443 ASSAY THYROID STIM HORMONE: CPT

## 2020-06-10 PROCEDURE — 82550 ASSAY OF CK (CPK): CPT

## 2020-06-10 PROCEDURE — 83735 ASSAY OF MAGNESIUM: CPT

## 2020-06-10 PROCEDURE — 84403 ASSAY OF TOTAL TESTOSTERONE: CPT

## 2020-06-10 PROCEDURE — 36415 COLL VENOUS BLD VENIPUNCTURE: CPT

## 2020-06-10 PROCEDURE — 93000 ELECTROCARDIOGRAM COMPLETE: CPT | Performed by: FAMILY MEDICINE

## 2020-06-10 PROCEDURE — 85025 COMPLETE CBC W/AUTO DIFF WBC: CPT

## 2020-06-10 PROCEDURE — 82671 ASSAY OF ESTROGENS: CPT

## 2020-06-12 ENCOUNTER — TELEPHONE (OUTPATIENT)
Dept: FAMILY MEDICINE CLINIC | Age: 55
End: 2020-06-12

## 2020-06-12 RX ORDER — CLONAZEPAM 1 MG/1
1-2 TABLET ORAL NIGHTLY PRN
Qty: 14 TABLET | Refills: 2 | Status: SHIPPED
Start: 2020-06-12 | End: 2020-06-29 | Stop reason: SDUPTHER

## 2020-06-12 NOTE — TELEPHONE ENCOUNTER
Correct. I sent new 1mg klonopin Rx to his pharmacy for next 7 days. Please advise him on strength of pill change so he knows to follow the new directions. Thanks.

## 2020-06-13 LAB — ZINC: 88.8 UG/DL (ref 60–120)

## 2020-06-14 LAB
ESTRADIOL LEVEL: 48.1 PG/ML (ref 10–42)
ESTROGEN TOTAL: 107.4 PG/ML (ref 19–69)
ESTRONE: 59.3 PG/ML (ref 9–36)

## 2020-06-19 ENCOUNTER — OFFICE VISIT (OUTPATIENT)
Dept: FAMILY MEDICINE CLINIC | Age: 55
End: 2020-06-19
Payer: COMMERCIAL

## 2020-06-19 VITALS
WEIGHT: 223 LBS | TEMPERATURE: 98.4 F | HEART RATE: 82 BPM | BODY MASS INDEX: 30.2 KG/M2 | SYSTOLIC BLOOD PRESSURE: 128 MMHG | HEIGHT: 72 IN | DIASTOLIC BLOOD PRESSURE: 74 MMHG | OXYGEN SATURATION: 96 %

## 2020-06-19 PROCEDURE — 99214 OFFICE O/P EST MOD 30 MIN: CPT | Performed by: FAMILY MEDICINE

## 2020-06-19 NOTE — PROGRESS NOTES
[facility]  Office Progress Note - Dr. Tigre Mcnulty  6/19/20    Chief Complaint   Patient presents with    Hypertension        HPI: Anxiety  Patient reports last week is been a little bit better. We increased his Klonopin dose to 2 mg nightly maximum. He has been taking 2 mg nightly and he has been sleeping better. The increased sleep has helped with his daytime mood. He is only sleeping for about 4 hours at a time and then tosses and turns for the rest of the night on and off, which is an improvement. Reviewed labs together and one pointing out that his estrogen was again high, likely from overtreatment with testosterone, he says he experienced this once in the past when he lived in [place] and he actually felt very similar. He reports that he had hot flashes, sleep disruptions, irritability, and abdominal pains. This summarizes his recent feelings. Testosterone was upper normal.  Acknowledged that my goal is to keep him in the low normal range and his goal is to stay in the high normal range. He understands the need to lower the testosterone dose in order to decrease the amount of estrogen that is being converted to. Abdominal pains have improved somewhat since stopping all the supplements he was taking.  ______________________________________________________  Family History   Problem Relation Age of Onset    No Known Problems Mother     Alcohol Abuse Father     Other Father          meningitis, low testosterone    Depression Daughter     ADHD Daughter        Past Surgical History:   Procedure Laterality Date    COLONOSCOPY  04/05/2012    tubular adenoma and tubulovillous adenoma.      COLONOSCOPY N/A 1/27/2020    COLORECTAL CANCER SCREENING, NOT HIGH RISK performed by Tiff Sanchez MD at [facility] 2013     FOOT SURGERY      left     KNEE ARTHROSCOPY      left     TONSILLECTOMY  1972       Social History     Tobacco Use    Smoking status: Former Smoker     Packs/day: 1.00     Years: 8.00     Pack years: 8.00     Types: Cigarettes     Last attempt to quit: 1990     Years since quittin.4    Smokeless tobacco: Never Used   Substance Use Topics    Alcohol use: Yes     Comment: occasional     Drug use: No     ______________________________________________________  ROS: POSITIVE: Anxiety, abdominal pain, restlessness, sleeplessness. Otherwise:  No CP, No palpitations,   No sob, No cough,   +abd pain, +heartburn,   +headaches, +vision changes, No hearing changes,   No tingling, No numbness, +weakness,   +bowel changes, No hematochezia, No melena,  No bladder changes, No hematuria  No skin rashes, No skin lesions. No polyuria, polydipsia, polyphagia. Anxious and irritable mood. ROS otherwise negative unless as listed in HPI. Chart reviewed and updated where appropriate for PMH, Fam, and Soc Hx.  _______________________________________________________  Physical Exam   /74 (Site: Left Upper Arm, Position: Sitting, Cuff Size: Large Adult)   Pulse 82   Temp 98.4 °F (36.9 °C) (Temporal)   Ht 6' (1.829 m)   Wt 223 lb (101.2 kg)   SpO2 96%   BMI 30.24 kg/m²   Wt Readings from Last 3 Encounters:   20 223 lb (101.2 kg)   06/10/20 228 lb (103.4 kg)   20 227 lb (103 kg)       Constitutional:    He is oriented to person, place, and time. He appears well-developed and well-nourished. HENT:    Nose: Nose normal.    Mouth/Throat: Oropharynx is clear and moist.   Eyes:    Conjunctivae are normal.    Pupils are equal, round, and reactive to light. EOMI. Neck:    Normal range of motion. No thyromegaly or nodules noted. No bruit. Cardiovascular:    Normal rate, regular rhythm and normal heart sounds. No murmur. No gallop and no friction rub. Pulmonary/Chest:    Effort normal and breath sounds normal.    No wheezes. No rales or rhonchi. Abdominal:    Soft. Bowel sounds are normal.    No distension.

## 2020-06-29 ENCOUNTER — PATIENT MESSAGE (OUTPATIENT)
Dept: FAMILY MEDICINE CLINIC | Age: 55
End: 2020-06-29

## 2020-06-29 RX ORDER — CLONAZEPAM 1 MG/1
1-1.5 TABLET ORAL NIGHTLY PRN
Qty: 45 TABLET | Refills: 0 | Status: SHIPPED
Start: 2020-07-02 | End: 2020-07-20 | Stop reason: DRUGHIGH

## 2020-06-29 NOTE — TELEPHONE ENCOUNTER
From: Jass Roman Vuletic  To: Kip Ramirez MD  Sent: 6/29/2020 1:12 PM EDT  Subject: Prescription Question    I will run out of klonopin this friday. Will need refill at whatever dose you choose to continue with. The two tablets before bed, it helped some. But not the knockout punch I expected it to have. Moderate on the helpfulness scale. It did leave me feeling a bit hungover the next day. But while lessened, sleep is still rough. The hormonal changes are seemingly doing good so far. Can tell estrogen levels are coming down. Thinking process is much clearer and I'm getting back to normal feeling.

## 2020-07-07 NOTE — TELEPHONE ENCOUNTER
Last Appointment:  6/19/2020  Future Appointments   Date Time Provider Rashel Gongora   7/20/2020  2:20 PM Latoya Sevilla  [address] [facility] [address]

## 2020-07-09 RX ORDER — ATORVASTATIN CALCIUM 40 MG/1
40 TABLET, FILM COATED ORAL NIGHTLY
Qty: 90 TABLET | Refills: 1 | Status: SHIPPED
Start: 2020-07-09 | End: 2021-01-05 | Stop reason: SDUPTHER

## 2020-07-17 ENCOUNTER — HOSPITAL ENCOUNTER (OUTPATIENT)
Age: 55
Discharge: HOME | End: 2020-07-19
Payer: COMMERCIAL

## 2020-07-17 LAB — TESTOSTERONE TOTAL: 535.1 NG/DL

## 2020-07-17 PROCEDURE — 82671 ASSAY OF ESTROGENS: CPT

## 2020-07-17 PROCEDURE — 84403 ASSAY OF TOTAL TESTOSTERONE: CPT

## 2020-07-17 PROCEDURE — 36415 COLL VENOUS BLD VENIPUNCTURE: CPT

## 2020-07-20 ENCOUNTER — OFFICE VISIT (OUTPATIENT)
Dept: FAMILY MEDICINE CLINIC | Age: 55
End: 2020-07-20
Payer: COMMERCIAL

## 2020-07-20 VITALS
DIASTOLIC BLOOD PRESSURE: 80 MMHG | HEART RATE: 93 BPM | SYSTOLIC BLOOD PRESSURE: 138 MMHG | TEMPERATURE: 98.3 F | BODY MASS INDEX: 30.2 KG/M2 | HEIGHT: 72 IN | OXYGEN SATURATION: 97 % | WEIGHT: 223 LBS

## 2020-07-20 PROCEDURE — 99213 OFFICE O/P EST LOW 20 MIN: CPT | Performed by: FAMILY MEDICINE

## 2020-07-20 RX ORDER — CLONAZEPAM 0.5 MG/1
0.25 TABLET ORAL NIGHTLY
Qty: 15 TABLET | Refills: 0 | Status: SHIPPED
Start: 2020-07-20 | End: 2021-01-21

## 2020-07-20 RX ORDER — CLONAZEPAM 1 MG/1
1 TABLET ORAL NIGHTLY PRN
Qty: 45 TABLET | Refills: 0 | Status: SHIPPED
Start: 2020-07-20 | End: 2020-08-11 | Stop reason: SDUPTHER

## 2020-07-20 NOTE — PROGRESS NOTES
heartburn,   No headaches, No vision changes, No hearing changes,   No tingling, No numbness, No weakness,   No bowel changes, No hematochezia, No melena,  No bladder changes, No hematuria  No skin rashes, No skin lesions. No polyuria, polydipsia, polyphagia. Stable mood. ROS otherwise negative unless as listed in HPI. Chart reviewed and updated where appropriate for PMH, Fam, and Soc Hx.  _______________________________________________________  Physical Exam   /80 (Site: Left Upper Arm, Position: Sitting, Cuff Size: Large Adult)   Pulse 93   Temp 98.3 °F (36.8 °C) (Temporal)   Ht 6' (1.829 m)   Wt 223 lb (101.2 kg)   SpO2 97%   BMI 30.24 kg/m²   Wt Readings from Last 3 Encounters:   07/20/20 223 lb (101.2 kg)   06/19/20 223 lb (101.2 kg)   06/10/20 228 lb (103.4 kg)       Constitutional:    He is oriented to person, place, and time. He appears well-developed and well-nourished. HENT:    Nose: Nose normal.    Mouth/Throat: Oropharynx is clear and moist.   Eyes:    Conjunctivae are normal.    Pupils are equal, round, and reactive to light. EOMI. Neck:    Normal range of motion. No thyromegaly or nodules noted. No bruit. Cardiovascular:    Normal rate, regular rhythm and normal heart sounds. No murmur. No gallop and no friction rub. Pulmonary/Chest:    Effort normal and breath sounds normal.    No wheezes. No rales or rhonchi. Abdominal:    Soft. Obese. Bowel sounds are normal.    No distension. No tenderness. Musculoskeletal:    Normal range of motion. No joint swelling noted. No peripheral edema. Skin:    Skin is warm and dry. No rashes, lesions. Shailesh complexion. Psychiatric:    He has a normal mood and flat affect. Normal groom and dress.  No SI or HI.   ________________________________________________________  Current Outpatient Medications on File Prior to Visit   Medication Sig Dispense Refill    atorvastatin (LIPITOR) 40 MG tablet Take 1 tablet by mouth nightly 90 tablet 1    dilTIAZem (CARDIZEM CD) 180 MG extended release capsule Take 1 capsule by mouth daily 30 capsule 5    testosterone (ANDROGEL; TESTIM) 50 MG/5GM (1%) GEL 1% gel APPLY THE CONTENTS OF 1 TUBE ON MONDAY, WEDNESDAY, AND FRIDAY AND APPLY THE CONTENTS OF 2 TUBES ON THE OTHER DAYS OF THE WEEK 600 g 2    nefazodone (SERZONE) 100 MG tablet TAKE 2 TABLETS IN THE      EVENING AND 1 TABLET IN THEMORNING 270 tablet 1    buPROPion (WELLBUTRIN SR) 150 MG extended release tablet Take 1 tablet by mouth 2 times daily 180 tablet 1    Multiple Vitamins-Minerals (THERAPEUTIC MULTIVITAMIN-MINERALS) tablet Take 1 tablet by mouth daily LD 1-18-20      Cholecalciferol (VITAMIN D) 50 MCG (2000 UT) CAPS capsule Take by mouth LD 1-18-20      aspirin 81 MG EC tablet Take 1 tablet by mouth daily 30 tablet 3     No current facility-administered medications on file prior to visit. Patient Active Problem List   Diagnosis Code    Moderate episode of recurrent major depressive disorder ([facility] [address] F33.1    Primary insomnia F51.01    Anxiety F41.9    Hypogonadotropic hypogonadism (HCC) E23.0    Hypertriglyceridemia E78.1    HTN (hypertension) I10    ZIA (obstructive sleep apnea) G47.33    Overweight E66.3    Insomnia G47.00    TIA (transient ischemic attack) G45.9     ________________________________________________________  Assessment / Dylan Stern was seen today for medication adjustment. Diagnoses and all orders for this visit:    Hypotestosteronism  T level better, now 535. He feels very low when he is in the low normal range, so mid normal range is probably his sweet spot. Estrogens pending. Most recent symptoms felt to be secondary to hyperestrogenism from too much T.  Understands risks of continued Tx - CAD, cancers, etc.  He feels this improves his QoL. Primary insomnia  -     clonazePAM (KLONOPIN) 0.5 MG tablet; Take 0.5 tablets by mouth nightly for 30 days.  Total dose 1.25mg nightly. -     clonazePAM (KLONOPIN) 1 MG tablet; Take 1 tablet by mouth nightly as needed (sleep) for up to 30 days. Will lower him to 1.25mg klonopin nightly. He was able to get down to 0.5mg nightly regularly over a year or so of slow weans. I bumped him back up due to acute worsening of mood and lack of sleep. I was worried he was moving toward a manic episode from lack of sleep. Return in about 6 weeks (around 8/31/2020). Patient counseled to follow up sooner or seek more acute care if symptoms worsening or not improving according to plan. .     Electronically signed by Winston Hannah MD on 7/20/2020    This note may have been created using dictation software.  Efforts were made to reduce grammatical or syntax errors, but some may persist.

## 2020-07-23 LAB
ESTRADIOL LEVEL: 38 PG/ML (ref 10–42)
ESTROGEN TOTAL: 76 PG/ML (ref 19–69)
ESTRONE: 38 PG/ML (ref 9–36)

## 2020-07-30 RX ORDER — DILTIAZEM HYDROCHLORIDE 180 MG/1
180 CAPSULE, COATED, EXTENDED RELEASE ORAL DAILY
Qty: 30 CAPSULE | Refills: 5 | Status: SHIPPED
Start: 2020-07-30 | End: 2021-02-22

## 2020-07-30 RX ORDER — BUPROPION HYDROCHLORIDE 150 MG/1
150 TABLET, EXTENDED RELEASE ORAL 2 TIMES DAILY
Qty: 180 TABLET | Refills: 1 | Status: SHIPPED
Start: 2020-07-30 | End: 2021-01-21

## 2020-07-30 NOTE — TELEPHONE ENCOUNTER
Last Appointment:  7/20/2020  Future Appointments   Date Time Provider Rashel Gongora   8/31/2020  2:00 PM Jose C Camacho  [address] [facility] [address]

## 2020-08-11 RX ORDER — CLONAZEPAM 1 MG/1
1 TABLET ORAL NIGHTLY PRN
Qty: 30 TABLET | Refills: 0 | Status: SHIPPED
Start: 2020-08-11 | End: 2020-09-08

## 2020-08-11 NOTE — TELEPHONE ENCOUNTER
Last Appointment:  7/20/2020  Future Appointments   Date Time Provider Rashel Gongora   8/31/2020  2:00 PM Rex Sykes  [address]

## 2020-08-17 RX ORDER — TESTOSTERONE GEL, 1% 10 MG/G
GEL TRANSDERMAL
Qty: 500 G | Refills: 1 | Status: SHIPPED
Start: 2020-08-17 | End: 2020-12-22 | Stop reason: SDUPTHER

## 2020-08-24 NOTE — TELEPHONE ENCOUNTER
I think I did this last week via fax - but he needs to use 8 tubes weekly. So how ever many tubes for a 90 day supply.

## 2020-09-08 ENCOUNTER — OFFICE VISIT (OUTPATIENT)
Dept: FAMILY MEDICINE CLINIC | Age: 55
End: 2020-09-08
Payer: COMMERCIAL

## 2020-09-08 ENCOUNTER — HOSPITAL ENCOUNTER (OUTPATIENT)
Age: 55
Discharge: HOME | End: 2020-09-10
Payer: COMMERCIAL

## 2020-09-08 VITALS
BODY MASS INDEX: 29.97 KG/M2 | DIASTOLIC BLOOD PRESSURE: 80 MMHG | HEART RATE: 80 BPM | SYSTOLIC BLOOD PRESSURE: 124 MMHG | WEIGHT: 221 LBS | OXYGEN SATURATION: 98 % | TEMPERATURE: 97.4 F

## 2020-09-08 LAB
BASOPHILS ABSOLUTE: 0.06 E9/L (ref 0–0.2)
BASOPHILS RELATIVE PERCENT: 1 % (ref 0–2)
EOSINOPHILS ABSOLUTE: 0.21 E9/L (ref 0.05–0.5)
EOSINOPHILS RELATIVE PERCENT: 3.6 % (ref 0–6)
HCT VFR BLD CALC: 47.2 % (ref 37–54)
HEMOGLOBIN: 15 G/DL (ref 12.5–16.5)
IMMATURE GRANULOCYTES #: 0.01 E9/L
IMMATURE GRANULOCYTES %: 0.2 % (ref 0–5)
LYMPHOCYTES ABSOLUTE: 1.44 E9/L (ref 1.5–4)
LYMPHOCYTES RELATIVE PERCENT: 24.8 % (ref 20–42)
MCH RBC QN AUTO: 30.7 PG (ref 26–35)
MCHC RBC AUTO-ENTMCNC: 31.8 % (ref 32–34.5)
MCV RBC AUTO: 96.5 FL (ref 80–99.9)
MONOCYTES ABSOLUTE: 0.55 E9/L (ref 0.1–0.95)
MONOCYTES RELATIVE PERCENT: 9.5 % (ref 2–12)
NEUTROPHILS ABSOLUTE: 3.53 E9/L (ref 1.8–7.3)
NEUTROPHILS RELATIVE PERCENT: 60.9 % (ref 43–80)
PARATHYROID HORMONE INTACT: 29 PG/ML (ref 15–65)
PDW BLD-RTO: 13.8 FL (ref 11.5–15)
PLATELET # BLD: 255 E9/L (ref 130–450)
PMV BLD AUTO: 10 FL (ref 7–12)
PROSTATE SPECIFIC ANTIGEN: 1.02 NG/ML (ref 0–4)
RBC # BLD: 4.89 E12/L (ref 3.8–5.8)
VITAMIN D 25-HYDROXY: 50 NG/ML (ref 30–100)
WBC # BLD: 5.8 E9/L (ref 4.5–11.5)

## 2020-09-08 PROCEDURE — 99214 OFFICE O/P EST MOD 30 MIN: CPT | Performed by: FAMILY MEDICINE

## 2020-09-08 PROCEDURE — 84403 ASSAY OF TOTAL TESTOSTERONE: CPT

## 2020-09-08 PROCEDURE — 85025 COMPLETE CBC W/AUTO DIFF WBC: CPT

## 2020-09-08 PROCEDURE — 82306 VITAMIN D 25 HYDROXY: CPT

## 2020-09-08 PROCEDURE — 83970 ASSAY OF PARATHORMONE: CPT

## 2020-09-08 PROCEDURE — G0103 PSA SCREENING: HCPCS

## 2020-09-08 PROCEDURE — 36415 COLL VENOUS BLD VENIPUNCTURE: CPT

## 2020-09-08 PROCEDURE — 82671 ASSAY OF ESTROGENS: CPT

## 2020-09-08 PROCEDURE — 83690 ASSAY OF LIPASE: CPT

## 2020-09-08 PROCEDURE — 85651 RBC SED RATE NONAUTOMATED: CPT

## 2020-09-08 PROCEDURE — 80053 COMPREHEN METABOLIC PANEL: CPT

## 2020-09-08 RX ORDER — CLONAZEPAM 0.5 MG/1
0.75 TABLET ORAL NIGHTLY PRN
Qty: 45 TABLET | Refills: 0 | Status: SHIPPED
Start: 2020-09-08 | End: 2020-10-06 | Stop reason: SDUPTHER

## 2020-09-08 RX ORDER — LAMOTRIGINE 50 MG/1
TABLET, ORALLY DISINTEGRATING ORAL
COMMUNITY
End: 2020-10-20

## 2020-09-08 NOTE — PROGRESS NOTES
Had CT abd without pathology noted in 2019 around time he had a flare in the past.  Also had colonoscopy. Suspect IBS. Alternating bowel habits, diarrhea sometimes after meals. Prostate enlargement noted on previous CT, seen on review today. Will check PSA. Asx. Lab Results   Component Value Date    CREATININE 0.9 06/10/2020       ______________________________________________________  Family History   Problem Relation Age of Onset    No Known Problems Mother     Alcohol Abuse Father     Other Father          meningitis, low testosterone    Depression Daughter     ADHD Daughter        Past Surgical History:   Procedure Laterality Date    COLONOSCOPY  2012    tubular adenoma and tubulovillous adenoma.  COLONOSCOPY N/A 2020    COLORECTAL CANCER SCREENING, NOT HIGH RISK performed by Kelin Walls MD at [facility]      FOOT SURGERY      left     KNEE ARTHROSCOPY      left     TONSILLECTOMY  1972       Social History     Tobacco Use    Smoking status: Former Smoker     Packs/day: 1.00     Years: 8.00     Pack years: 8.00     Types: Cigarettes     Last attempt to quit: 1990     Years since quittin.7    Smokeless tobacco: Never Used   Substance Use Topics    Alcohol use: Yes     Comment: occasional     Drug use: No     ______________________________________________________  ROS: POSITIVE:As in HPI, otherwise as below. Otherwise:  No CP, No palpitations,   No sob, No cough,   + abd pain, No heartburn,   +headaches, No vision changes, No hearing changes,   No tingling, No numbness, +weakness,   +bowel changes, No hematochezia, No melena,  No bladder changes, No hematuria  No skin rashes, No skin lesions. No polyuria, polydipsia, polyphagia. Anxious and Dep mood. ROS otherwise negative unless as listed in HPI.     Chart reviewed and updated where appropriate for PMH, Fam, and Soc Hx.  _______________________________________________________  Physical Exam   /80   Pulse 80   Temp 97.4 °F (36.3 °C)   Wt 221 lb (100.2 kg)   SpO2 98%   BMI 29.97 kg/m²   Wt Readings from Last 3 Encounters:   09/08/20 221 lb (100.2 kg)   07/20/20 223 lb (101.2 kg)   06/19/20 223 lb (101.2 kg)       Constitutional:    He is oriented to person, place, and time. He appears well-developed and well-nourished. HENT:    Nose: Nose normal.    Mouth/Throat: Oropharynx is clear and moist.   Eyes:    Conjunctivae are normal.    Pupils are equal, round, and reactive to light. EOMI. Neck:    Normal range of motion. No thyromegaly or nodules noted. No bruit. Cardiovascular:    Normal rate, regular rhythm and normal heart sounds. No murmur. No gallop and no friction rub. Pulmonary/Chest:    Effort normal and breath sounds normal.    No wheezes. No rales or rhonchi. Abdominal:    Soft. Obese, not tympanic. Bowel sounds are normal.    No distension. No tenderness. Musculoskeletal:    Normal range of motion. No joint swelling noted. No peripheral edema. Neurological:    He is A&Ox3. Motor and sensation grossly intact. Normal Gait. Skin:    Skin is warm and dry. No rashes, lesions. Psychiatric:    He has a sometimes anx sometimes dep mood and normal to flat affect. Normal groom and dress. No SI or HI.   ________________________________________________________  Current Outpatient Medications on File Prior to Visit   Medication Sig Dispense Refill    lamoTRIgine (LAMICTAL ODT) 50 MG TBDP Take by mouth      testosterone (ANDROGEL; TESTIM) 50 MG/5GM (1%) GEL 1% gel APPLY THE CONTENTS OF 2 TUBES ONE DAY WEEKLY. ON THE OTHER DAYS, APPLY JUST ONE TUBE.  500 g 1    buPROPion (WELLBUTRIN SR) 150 MG extended release tablet Take 1 tablet by mouth 2 times daily 180 tablet 1    nefazodone (SERZONE) 100 MG tablet TAKE 2 TABLETS IN THE      EVENING AND 1 TABLET IN THEMORNING 270 tablet

## 2020-09-09 LAB
ALBUMIN SERPL-MCNC: 4 G/DL (ref 3.5–5.2)
ALP BLD-CCNC: 55 U/L (ref 40–129)
ALT SERPL-CCNC: 29 U/L (ref 0–40)
ANION GAP SERPL CALCULATED.3IONS-SCNC: 18 MMOL/L (ref 7–16)
AST SERPL-CCNC: 22 U/L (ref 0–39)
BILIRUB SERPL-MCNC: 0.3 MG/DL (ref 0–1.2)
BUN BLDV-MCNC: 15 MG/DL (ref 6–20)
CALCIUM SERPL-MCNC: 9.1 MG/DL (ref 8.6–10.2)
CHLORIDE BLD-SCNC: 103 MMOL/L (ref 98–107)
CO2: 20 MMOL/L (ref 22–29)
CREAT SERPL-MCNC: 0.9 MG/DL (ref 0.7–1.2)
GFR AFRICAN AMERICAN: >60
GFR NON-AFRICAN AMERICAN: >60 ML/MIN/1.73
GLUCOSE BLD-MCNC: 130 MG/DL (ref 74–99)
LIPASE: 28 U/L (ref 13–60)
POTASSIUM SERPL-SCNC: 4.5 MMOL/L (ref 3.5–5)
SEDIMENTATION RATE, ERYTHROCYTE: 0 MM/HR (ref 0–15)
SODIUM BLD-SCNC: 141 MMOL/L (ref 132–146)
TESTOSTERONE TOTAL: 216 NG/DL
TOTAL PROTEIN: 6.8 G/DL (ref 6.4–8.3)

## 2020-09-12 LAB
ESTRADIOL LEVEL: 30.2 PG/ML (ref 10–42)
ESTROGEN TOTAL: 68.9 PG/ML (ref 19–69)
ESTRONE: 38.7 PG/ML (ref 9–36)

## 2020-10-08 RX ORDER — CLONAZEPAM 0.5 MG/1
0.5 TABLET ORAL NIGHTLY PRN
Qty: 30 TABLET | Refills: 0 | Status: SHIPPED
Start: 2020-10-08 | End: 2020-11-04 | Stop reason: SDUPTHER

## 2020-10-20 ENCOUNTER — OFFICE VISIT (OUTPATIENT)
Dept: FAMILY MEDICINE CLINIC | Age: 55
End: 2020-10-20
Payer: COMMERCIAL

## 2020-10-20 VITALS
HEART RATE: 74 BPM | SYSTOLIC BLOOD PRESSURE: 138 MMHG | OXYGEN SATURATION: 96 % | DIASTOLIC BLOOD PRESSURE: 80 MMHG | TEMPERATURE: 97.7 F | HEIGHT: 72 IN | BODY MASS INDEX: 30.34 KG/M2 | RESPIRATION RATE: 18 BRPM | WEIGHT: 224 LBS

## 2020-10-20 PROCEDURE — 90686 IIV4 VACC NO PRSV 0.5 ML IM: CPT | Performed by: FAMILY MEDICINE

## 2020-10-20 PROCEDURE — 99214 OFFICE O/P EST MOD 30 MIN: CPT | Performed by: FAMILY MEDICINE

## 2020-10-20 PROCEDURE — 90471 IMMUNIZATION ADMIN: CPT | Performed by: FAMILY MEDICINE

## 2020-10-20 RX ORDER — ERGOCALCIFEROL 1.25 MG/1
50000 CAPSULE ORAL WEEKLY
Qty: 12 CAPSULE | Refills: 1 | Status: SHIPPED
Start: 2020-10-20 | End: 2021-08-09 | Stop reason: SDUPTHER

## 2020-10-20 RX ORDER — LAMOTRIGINE 100 MG/1
TABLET ORAL
COMMUNITY
Start: 2020-10-19 | End: 2021-01-21

## 2020-10-20 NOTE — PATIENT INSTRUCTIONS
Patient Education        Plantar Fasciitis: Exercises  Introduction  Here are some examples of exercises for you to try. The exercises may be suggested for a condition or for rehabilitation. Start each exercise slowly. Ease off the exercises if you start to have pain. You will be told when to start these exercises and which ones will work best for you. How to do the exercises  Towel stretch   1. Sit with your legs extended and knees straight. 2. Place a towel around your foot just under the toes. 3. Hold each end of the towel in each hand, with your hands above your knees. 4. Pull back with the towel so that your foot stretches toward you. 5. Hold the position for at least 15 to 30 seconds. 6. Repeat 2 to 4 times a session, up to 5 sessions a day. Calf stretch   This exercise stretches the muscles at the back of the lower leg (the calf) and the Achilles tendon. Do this exercise 3 or 4 times a day, 5 days a week. 1. Stand facing a wall with your hands on the wall at about eye level. Put the leg you want to stretch about a step behind your other leg. 2. Keeping your back heel on the floor, bend your front knee until you feel a stretch in the back leg. 3. Hold the stretch for 15 to 30 seconds. Repeat 2 to 4 times. Plantar fascia and calf stretch   Stretching the plantar fascia and calf muscles can increase flexibility and decrease heel pain. You can do this exercise several times each day and before and after activity. 1. Stand on a step as shown above. Be sure to hold on to the banister. 2. Slowly let your heels down over the edge of the step as you relax your calf muscles. You should feel a gentle stretch across the bottom of your foot and up the back of your leg to your knee. 3. Hold the stretch about 15 to 30 seconds, and then tighten your calf muscle a little to bring your heel back up to the level of the step. Repeat 2 to 4 times.     Towel curls   Make this exercise more challenging by placing a weighted object, such as a soup can, on the other end of the towel. 1. While sitting, place your foot on a towel on the floor and scrunch the towel toward you with your toes. 2. Then, also using your toes, push the towel away from you. [place] pickups   1. Put marbles on the floor next to a cup.  2. Using your toes, try to lift the marbles up from the floor and put them in the cup. Follow-up care is a key part of your treatment and safety. Be sure to make and go to all appointments, and call your doctor if you are having problems. It's also a good idea to know your test results and keep a list of the medicines you take. Where can you learn more? Go to [email or web address] org and sign in to your [facility] account. Enter Q404 in the [employer] box to learn more about \"Plantar Fasciitis: Exercises. \"     If you do not have an account, please click on the \"Sign Up Now\" link. Current as of: March 2, 2020               Content Version: 12.6  © 8986-0840 [employer]. Care instructions adapted under license by [facility] ([facility]). If you have questions about a medical condition or this instruction, always ask your healthcare professional. [address] any warranty or liability for your use of this information.

## 2020-10-20 NOTE — PROGRESS NOTES
[facility]  Office Progress Note - Dr. Brody Patient  10/20/20    Chief Complaint   Patient presents with    Follow-up     6 weeks    Other     Hypotestosteronism     Fatigue        HPI: hypogonadism  Thinks he is feeling a little better  Sexual function acceptable right now. Fatigue seems about average for him. He is using one pack of testosterone gel daily and an extra one 2 days weekly. Extra 3 days weekly was too much, and he felt too low at one extra weekly (and labs were lower as well)    He started back to a higher dose of vitamin D at about 11-12k units daily. He has bene feeling ok on that  Discussed this doesn't necessarily make objecive sense, but his level is ok at 50. Would rather switch to 50k once weekly, which I would prefer as well. Sleep has been ongoing problem. Cycle is off again  4-5 in the morning is about average time for him to fall asleep. He did have a sleep study before. Had sleep apnea, but lost some weight and symptoms seemed to improve. Mood seems to be doing well, Lamictal going ok. Maybe wrking better for him. 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 Counseling and CBT might help him more than medicine. He will consider and may ask his psychiatry prescriber. ______________________________________________________  Family History   Problem Relation Age of Onset    No Known Problems Mother     Alcohol Abuse Father     Other Father          meningitis, low testosterone    Depression Daughter     ADHD Daughter        Past Surgical History:   Procedure Laterality Date    COLONOSCOPY  04/05/2012    tubular adenoma and tubulovillous adenoma.      COLONOSCOPY N/A 1/27/2020    COLORECTAL CANCER SCREENING, NOT HIGH RISK performed by Baljit Hills MD at [facility] 2013     FOOT SURGERY      left     KNEE ARTHROSCOPY      left     TONSILLECTOMY  1972       Social History     Tobacco Use    Smoking status: Former Smoker     Packs/day: 1.00     Years: 8.00     Pack years: 8.00     Types: Cigarettes     Last attempt to quit: 1990     Years since quittin.8    Smokeless tobacco: Never Used   Substance Use Topics    Alcohol use: Yes     Comment: occasional     Drug use: No     ______________________________________________________  ROS: POSITIVE:insomnia  Otherwise:  No CP, No palpitations,   No sob, No cough,   No abd pain, No heartburn,   No headaches, No vision changes, No hearing changes,   No tingling, No numbness, No weakness,   No bowel changes, No hematochezia, No melena,  No bladder changes, No hematuria  No skin rashes, No skin lesions. No polyuria, polydipsia, polyphagia. Stable mood. ROS otherwise negative unless as listed in HPI. Chart reviewed and updated where appropriate for PMH, Fam, and Soc Hx.  _______________________________________________________  Physical Exam   /80   Pulse 74   Temp 97.7 °F (36.5 °C) (Temporal)   Resp 18   Ht 6' (1.829 m)   Wt 224 lb (101.6 kg)   SpO2 96%   BMI 30.38 kg/m²   Wt Readings from Last 3 Encounters:   10/20/20 224 lb (101.6 kg)   20 221 lb (100.2 kg)   20 223 lb (101.2 kg)       Constitutional:    He is oriented to person, place, and time. He appears well-developed and well-nourished. HENT:    Nose: Nose normal.    Mouth/Throat: Oropharynx is clear and moist.   Eyes:    Conjunctivae are normal.    Pupils are equal, round, and reactive to light. EOMI. Neck:    Normal range of motion. No thyromegaly or nodules noted. No bruit. Cardiovascular:    Normal rate, regular rhythm and normal heart sounds. No murmur. No gallop and no friction rub. Pulmonary/Chest:    Effort normal and breath sounds normal.    No wheezes. No rales or rhonchi. Abdominal:    Soft. Bowel sounds are normal.    No distension. No tenderness. Skin:    Skin is warm and dry. No rashes, lesions. Psychiatric:    He has a normal mood and affect. Fair eye contact. Sometimes avoidant. Normal groom and dress. No SI or HI.   ________________________________________________________  Current Outpatient Medications on File Prior to Visit   Medication Sig Dispense Refill    lamoTRIgine (LAMICTAL) 100 MG tablet       clonazePAM (KLONOPIN) 0.5 MG tablet Take 1 tablet by mouth nightly as needed (sleep) for up to 30 days. 30 tablet 0    testosterone (ANDROGEL; TESTIM) 50 MG/5GM (1%) GEL 1% gel APPLY THE CONTENTS OF 2 TUBES ONE DAY WEEKLY. ON THE OTHER DAYS, APPLY JUST ONE TUBE. 500 g 1    buPROPion (WELLBUTRIN SR) 150 MG extended release tablet Take 1 tablet by mouth 2 times daily 180 tablet 1    nefazodone (SERZONE) 100 MG tablet TAKE 2 TABLETS IN THE      EVENING AND 1 TABLET IN THEMORNING 270 tablet 1    dilTIAZem (CARDIZEM CD) 180 MG extended release capsule Take 1 capsule by mouth daily 30 capsule 5    clonazePAM (KLONOPIN) 0.5 MG tablet Take 0.5 tablets by mouth nightly for 30 days. Total dose 1.25mg nightly. 15 tablet 0    atorvastatin (LIPITOR) 40 MG tablet Take 1 tablet by mouth nightly 90 tablet 1    Multiple Vitamins-Minerals (THERAPEUTIC MULTIVITAMIN-MINERALS) tablet Take 1 tablet by mouth daily LD 1-18-20      aspirin 81 MG EC tablet Take 1 tablet by mouth daily 30 tablet 3     No current facility-administered medications on file prior to visit.         Patient Active Problem List   Diagnosis Code    Moderate episode of recurrent major depressive disorder ([facility] F33.1    Primary insomnia F51.01    Anxiety F41.9    Hypogonadotropic hypogonadism ([facility] E23.0    Hypertriglyceridemia E78.1    HTN (hypertension) I10    ZIA (obstructive sleep apnea) G47.33    Overweight E66.3    Insomnia G47.00    TIA (transient ischemic attack) G45.9     ________________________________________________________  Assessment / Delores Robison was seen today for follow-up, other and fatigue. Diagnoses and all orders for this visit:    Primary insomnia  Worse recently. Hypervigilant. CBT recommended. Will consider. Continue same meds. Hypotestosteronism  Improved. Current 1 oacket every day. Additional packet 2 days weekly. Vitamin D deficiency  Level = 50, ok. Switch to 50k units weekly. Arthralgia, unspecified joint  Improved some  Workup neg for inflammatory process. Anxiety  Stable to improved. Tolerating 0.5mg klonopin daily. Thinks might be able to come off it. Has been doing well with lamictal.     Other orders  -     vitamin D (ERGOCALCIFEROL) 1.25 MG (60404 UT) CAPS capsule; Take 1 capsule by mouth once a week  -     INFLUENZA, QUADV, 3 YRS AND OLDER, IM PF, PREFILL SYR OR SDV, 0.5ML (AFLURIA QUADV, PF)      Labs in 6 weeks. Return in about 3 months (around 1/20/2021). Patient counseled to follow up sooner or seek more acute care if symptoms worsening or not improving according to plan. .     Electronically signed by Eugenie Espana MD on 10/21/2020    This note may have been created using dictation software.  Efforts were made to reduce grammatical or syntax errors, but some may persist.

## 2020-10-27 RX ORDER — NEFAZODONE HYDROCHLORIDE 200 MG/1
TABLET ORAL
Qty: 180 TABLET | Refills: 0 | Status: SHIPPED
Start: 2020-10-27 | End: 2021-01-21

## 2020-10-28 ENCOUNTER — TELEPHONE (OUTPATIENT)
Dept: FAMILY MEDICINE CLINIC | Age: 55
End: 2020-10-28

## 2020-10-28 NOTE — TELEPHONE ENCOUNTER
Need to notify pt that Nefazodone 100mg is on backorder and Doctor put in a new script with new directions, 200mg 1/2 tablet every morning and 1 tablet every evening.

## 2020-10-30 ENCOUNTER — TELEPHONE (OUTPATIENT)
Dept: FAMILY MEDICINE CLINIC | Age: 55
End: 2020-10-30

## 2020-11-05 RX ORDER — CLONAZEPAM 0.5 MG/1
0.5 TABLET ORAL NIGHTLY PRN
Qty: 30 TABLET | Refills: 0 | Status: SHIPPED
Start: 2020-11-05 | End: 2020-11-30 | Stop reason: SDUPTHER

## 2020-11-05 NOTE — TELEPHONE ENCOUNTER
Last Appointment:  10/20/2020  Future Appointments   Date Time Provider Rashel Gongora   1/21/2021  1:20 PM Ev Up  W [facility] [address]

## 2020-11-30 RX ORDER — CLONAZEPAM 0.5 MG/1
.25-.5 TABLET ORAL NIGHTLY PRN
Qty: 30 TABLET | Refills: 2 | Status: SHIPPED
Start: 2020-11-30 | End: 2021-02-18 | Stop reason: SDUPTHER

## 2020-11-30 NOTE — TELEPHONE ENCOUNTER
Last Appointment:  10/20/2020  Future Appointments   Date Time Provider Rashel Gongora   1/21/2021  1:20 PM Sammi Rod  W [address] [place] [address]

## 2020-12-21 DIAGNOSIS — E34.9 HYPOTESTOSTERONISM: ICD-10-CM

## 2020-12-23 NOTE — TELEPHONE ENCOUNTER
Last Appointment:  10/20/2020  Future Appointments   Date Time Provider Rashel Gongora   1/21/2021  1:20 PM Jalen Hays  W [facility] [address]

## 2020-12-25 LAB
SEX HORMONE BINDING GLOBULIN: 23 NMOL/L (ref 11–80)
TESTOSTERONE FREE-NONMALE: 13.9 PG/ML (ref 47–244)
TESTOSTERONE TOTAL: 63 NG/DL (ref 220–1000)

## 2020-12-26 LAB
ESTRADIOL LEVEL: 13.4 PG/ML (ref 10–42)
ESTROGEN TOTAL: 43.5 PG/ML (ref 19–69)
ESTRONE: 30.1 PG/ML (ref 9–36)

## 2020-12-28 RX ORDER — TESTOSTERONE GEL, 1% 10 MG/G
GEL TRANSDERMAL
Qty: 500 G | Refills: 1 | Status: SHIPPED
Start: 2020-12-28 | End: 2020-12-29

## 2020-12-29 PROBLEM — E34.9 HYPOTESTOSTERONISM: Status: ACTIVE | Noted: 2020-12-29

## 2020-12-29 RX ORDER — TESTOSTERONE GEL, 1% 10 MG/G
GEL TRANSDERMAL
Qty: 500 G | Refills: 1 | Status: SHIPPED
Start: 2020-12-29 | End: 2021-02-22 | Stop reason: SDUPTHER

## 2021-01-05 ENCOUNTER — PATIENT MESSAGE (OUTPATIENT)
Dept: FAMILY MEDICINE CLINIC | Age: 56
End: 2021-01-05

## 2021-01-05 RX ORDER — ATORVASTATIN CALCIUM 40 MG/1
40 TABLET, FILM COATED ORAL NIGHTLY
Qty: 90 TABLET | Refills: 1 | Status: SHIPPED
Start: 2021-01-05 | End: 2021-04-14 | Stop reason: SDUPTHER

## 2021-01-05 NOTE — TELEPHONE ENCOUNTER
From: Mike Jensen Vuletic  To: Jet Manuel MD  Sent: 1/5/2021 1:55 PM EST  Subject: Non-Urgent Yi Del Rosario    Since we increased the testosterone, sleep, mood, sex drive are all getting better. So far no signs of any estrogen problems. Those things all seem to be on the road to being right. However, my blood pressures are not good. This has been common for me over the years as my testosterone levels are increased. Had some top numbers in the 170, bottom numbers over 100. I have an appointment to see you on the 21st of this month. I wanted to advise you beforehand and see if you wanted to make any Diltiazem dose increases. Thanks.

## 2021-01-05 NOTE — TELEPHONE ENCOUNTER
Last Appointment:  10/20/2020  Future Appointments   Date Time Provider Rashel Gongora   1/21/2021  1:20 PM Laura Dixon  W [facility] [address]

## 2021-01-21 ENCOUNTER — OFFICE VISIT (OUTPATIENT)
Dept: FAMILY MEDICINE CLINIC | Age: 56
End: 2021-01-21
Payer: COMMERCIAL

## 2021-01-21 VITALS
TEMPERATURE: 97.6 F | SYSTOLIC BLOOD PRESSURE: 142 MMHG | HEART RATE: 77 BPM | DIASTOLIC BLOOD PRESSURE: 86 MMHG | BODY MASS INDEX: 30.07 KG/M2 | OXYGEN SATURATION: 99 % | WEIGHT: 222 LBS | HEIGHT: 72 IN

## 2021-01-21 DIAGNOSIS — E23.0 HYPOGONADOTROPIC HYPOGONADISM (HCC): ICD-10-CM

## 2021-01-21 DIAGNOSIS — F41.9 ANXIETY: ICD-10-CM

## 2021-01-21 DIAGNOSIS — F33.1 MODERATE EPISODE OF RECURRENT MAJOR DEPRESSIVE DISORDER (HCC): ICD-10-CM

## 2021-01-21 DIAGNOSIS — F51.01 PRIMARY INSOMNIA: ICD-10-CM

## 2021-01-21 DIAGNOSIS — I10 HYPERTENSION, UNSPECIFIED TYPE: ICD-10-CM

## 2021-01-21 DIAGNOSIS — E34.9 HYPOTESTOSTERONISM: Primary | ICD-10-CM

## 2021-01-21 PROCEDURE — 99214 OFFICE O/P EST MOD 30 MIN: CPT | Performed by: FAMILY MEDICINE

## 2021-01-21 RX ORDER — DESVENLAFAXINE 50 MG/1
50 TABLET, EXTENDED RELEASE ORAL DAILY
Qty: 30 TABLET | Refills: 3 | Status: SHIPPED
Start: 2021-01-21 | End: 2021-03-23 | Stop reason: SDUPTHER

## 2021-01-21 RX ORDER — BLOOD PRESSURE TEST KIT
KIT MISCELLANEOUS
COMMUNITY
Start: 2020-11-23

## 2021-01-21 RX ORDER — LAMOTRIGINE 150 MG/1
TABLET ORAL
COMMUNITY
Start: 2021-01-18 | End: 2021-02-02 | Stop reason: SDUPTHER

## 2021-01-21 NOTE — PROGRESS NOTES
[facility]  Office Progress Note - Dr. Joselito King  1/21/21    CC:   Chief Complaint   Patient presents with    Hypertension      Checking at home, has gotten 170/100 readings        HPI:   Hypertension  Follow-up  Blood pressure is borderline controlled today. Better than home readings. He relates to maybe inc in testosterone dose. BP Readings from Last 3 Encounters:   01/21/21 (!) 142/86   10/20/20 138/80   09/08/20 124/80     Patient continues medications regularly. Compliance is good. Denies CP, sob, abd pain, headaches, vision changes, dizziness, hypotensive symptoms. No side effects from medications noted. Mood  wellbutrin and lamictal  He is concerned about stopping wellbutrin - psych had suggested this, and he is wanting to try something else for depression. Would like to transition care back here - \"less copays\" and feels gets to tlak longer here. But also considering if GI SE could be related to wellbutrin as they started around same time  Notes took pristiq in past without issues but thought maybe wasn't digesting/absorbing, bc of frequent and loose bms at the time. Sleep is fair - stable. reduiced to klonopin 0.25 to 0.5 nightly, usually 0.5. He is thinking would toby to get off of that and switch to eSolar, which I am not completely against.   One thing / change at a time though. .     In past he has used:  SNRIs seemed to help beter than SSRIs  Pristiq: stopped due to bowel problems which may have not bene related to the med. BMs have been improved with one immodium a day. Gut pain has improved as well. Usually has one BM daily now.        _________________________________________________________    Assessment / Tori Pino was seen today for hypertension.     Diagnoses and all orders for this visit:    Hypotestosteronism    Hypogonadotropic hypogonadism ([facility]    T low and estrogen back to normal. Increased testosterone dose slightly to an additional double packet day weekly. Will monitor closely - another 4-6 weeks then labs. Next visit. Feels fairly well. Primary insomnia  Continue klonopin same dose for now. Slow wean of benzo over time. May switch to [place]. Anxiety    Moderate episode of recurrent major depressive disorder (HCC)    Anxiety and depression  Notes lamictal does decrease irritability which has bene nice. Would like to try something else for depression. W ewill switch wellbutrin to pristiq and see how he does. F/u 1 month. -     desvenlafaxine succinate (PRISTIQ) 50 MG TB24 extended release tablet; Take 1 tablet by mouth daily    Hypertension, unspecified type  Borderline. Better readings recently. Continue to monitor. Return in about 1 month (around 2/21/2021). or as scheduled. Patient counseled to follow up sooner or seek more acute care if symptoms worsening or not improving according to plan. Electronically signed by Jet Manuel MD on 1/22/2021    _________________________________________________________  Current Outpatient Medications on File Prior to Visit   Medication Sig Dispense Refill    lamoTRIgine (LAMICTAL) 150 MG tablet       Blood Pressure Monitoring (OMRON 5 SERIES BP MONITOR) SHAILESH       atorvastatin (LIPITOR) 40 MG tablet Take 1 tablet by mouth nightly 90 tablet 1    testosterone (ANDROGEL; TESTIM) 50 MG/5GM (1%) GEL 1% gel APPLY THE CONTENTS OF 2 TUBES THREE DAYS WEEKLY. ON THE OTHER DAYS, APPLY JUST ONE TUBE. 500 g 1    clonazePAM (KLONOPIN) 0.5 MG tablet Take 0.5-1 tablets by mouth nightly as needed (sleep) for up to 90 days.  30 tablet 2    vitamin D (ERGOCALCIFEROL) 1.25 MG (33738 UT) CAPS capsule Take 1 capsule by mouth once a week 12 capsule 1    dilTIAZem (CARDIZEM CD) 180 MG extended release capsule Take 1 capsule by mouth daily 30 capsule 5

## 2021-02-02 RX ORDER — LAMOTRIGINE 150 MG/1
150 TABLET ORAL DAILY
Qty: 90 TABLET | Refills: 0 | Status: SHIPPED
Start: 2021-02-02 | End: 2021-05-14 | Stop reason: SDUPTHER

## 2021-02-02 NOTE — TELEPHONE ENCOUNTER
Last Appointment:  1/21/2021  Future Appointments   Date Time Provider Rashel Gongora   2/22/2021  1:00 PM William Soares  W [address] [place] [address]

## 2021-02-18 DIAGNOSIS — F51.01 PRIMARY INSOMNIA: ICD-10-CM

## 2021-02-19 RX ORDER — CLONAZEPAM 0.5 MG/1
.25-.5 TABLET ORAL NIGHTLY PRN
Qty: 30 TABLET | Refills: 0 | Status: SHIPPED
Start: 2021-02-19 | End: 2021-03-23 | Stop reason: SDUPTHER

## 2021-02-22 ENCOUNTER — OFFICE VISIT (OUTPATIENT)
Dept: FAMILY MEDICINE CLINIC | Age: 56
End: 2021-02-22
Payer: COMMERCIAL

## 2021-02-22 VITALS
HEIGHT: 72 IN | OXYGEN SATURATION: 96 % | WEIGHT: 224 LBS | DIASTOLIC BLOOD PRESSURE: 80 MMHG | SYSTOLIC BLOOD PRESSURE: 148 MMHG | HEART RATE: 89 BPM | BODY MASS INDEX: 30.34 KG/M2 | TEMPERATURE: 97.9 F

## 2021-02-22 DIAGNOSIS — E23.0 HYPOGONADOTROPIC HYPOGONADISM (HCC): ICD-10-CM

## 2021-02-22 DIAGNOSIS — E34.9 HYPOTESTOSTERONISM: ICD-10-CM

## 2021-02-22 DIAGNOSIS — I10 ESSENTIAL HYPERTENSION: ICD-10-CM

## 2021-02-22 DIAGNOSIS — F41.9 ANXIETY: ICD-10-CM

## 2021-02-22 DIAGNOSIS — F33.1 MODERATE EPISODE OF RECURRENT MAJOR DEPRESSIVE DISORDER (HCC): Primary | ICD-10-CM

## 2021-02-22 PROCEDURE — 99214 OFFICE O/P EST MOD 30 MIN: CPT | Performed by: FAMILY MEDICINE

## 2021-02-22 RX ORDER — TESTOSTERONE GEL, 1% 10 MG/G
GEL TRANSDERMAL
Qty: 500 G | Refills: 2 | Status: SHIPPED
Start: 2021-02-22 | End: 2021-06-28 | Stop reason: SDUPTHER

## 2021-02-22 RX ORDER — DILTIAZEM HYDROCHLORIDE 240 MG/1
240 CAPSULE, COATED, EXTENDED RELEASE ORAL DAILY
Qty: 30 CAPSULE | Refills: 1 | Status: SHIPPED
Start: 2021-02-22 | End: 2021-04-28 | Stop reason: SDUPTHER

## 2021-02-22 NOTE — TELEPHONE ENCOUNTER
Last Appointment:  2/22/2021  Future Appointments   Date Time Provider Rashel Evansi   3/23/2021  1:20 PM MD ED [facility]      Pt forgot when in office, that he needs refill on testosterone.

## 2021-02-22 NOTE — PROGRESS NOTES
[facility]  Office Progress Note - Dr. Urbano Gregory  2/22/21    CC:   Chief Complaint   Patient presents with    Insomnia        HPI: switch from Wellbutrin to pristiq  Rough for a week or so and then seems to get evened out. Feels like maybe this is better than the Wellbutrin for the depression. No significant Se   \"im more level\"  Would like to continue vitamin D 23464 units through the winter - as feels SAD did not feel as strong this year. Hypertension  Follow-up  Blood pressure is not controlled today. BP Readings from Last 3 Encounters:   02/22/21 (!) 148/80   01/21/21 (!) 142/86   10/20/20 138/80   has been seeing 150s/90s at home. Patient continues medications regularly. Compliance is good. Denies CP, sob, abd pain, headaches, vision changes, dizziness, hypotensive symptoms. No side effects from medications noted. Low testosterone  Thinks things have been feeling well recently. Continues generic AndroGel. Notes improved wellbeing, sexual function, mood when using the therapy. Understands risks versus benefits. Understands that this could increase the risk of heart disease and possibly prostate disease. Need to update labs before his next appointment.  _________________________________________________________    Assessment / Michelle Randa was seen today for insomnia. Diagnoses and all orders for this visit:    Moderate episode of recurrent major depressive disorder ([address]    Anxiety    Mood improved on current regimen. Continue Pristiq, Lamictal, nefazodone, and bedtime low-dose Klonopin for sleep. No major side effects noted. Overall he feels this is a better regimen than the previous Wellbutrin. We will continue at the same dose of all medicines for another month for him to continue to tested out. Hypogonadotropic hypogonadism (HCC)  -     Testosterone, free, total; Future  -     ESTROGENS, FRACTIONATED;  Future Continue testosterone replacement therapy with AndroGel. Essential hypertension -persistently borderline or high  -   Increase dilTIAZem (CARDIZEM CD) 240 MG extended release capsule; Take 1 capsule by mouth daily    Labs before next appt. Return in about 1 month (around 3/22/2021). or as scheduled. Patient counseled to follow up sooner or seek more acute care if symptoms worsening or not improving according to plan. Electronically signed by Belen Gleason MD on 2/22/2021    _________________________________________________________  Current Outpatient Medications on File Prior to Visit   Medication Sig Dispense Refill    clonazePAM (KLONOPIN) 0.5 MG tablet Take 0.5-1 tablets by mouth nightly as needed (sleep) for up to 30 days. 30 tablet 0    lamoTRIgine (LAMICTAL) 150 MG tablet Take 1 tablet by mouth daily 90 tablet 0    Blood Pressure Monitoring (OMRON 5 SERIES BP MONITOR) SHAILESH       desvenlafaxine succinate (PRISTIQ) 50 MG TB24 extended release tablet Take 1 tablet by mouth daily 30 tablet 3    atorvastatin (LIPITOR) 40 MG tablet Take 1 tablet by mouth nightly 90 tablet 1    vitamin D (ERGOCALCIFEROL) 1.25 MG (52160 UT) CAPS capsule Take 1 capsule by mouth once a week 12 capsule 1    Multiple Vitamins-Minerals (THERAPEUTIC MULTIVITAMIN-MINERALS) tablet Take 1 tablet by mouth daily LD 1-18-20      aspirin 81 MG EC tablet Take 1 tablet by mouth daily 30 tablet 3     No current facility-administered medications on file prior to visit.         Patient Active Problem List   Diagnosis Code    Moderate episode of recurrent major depressive disorder (HCC) F33.1    Primary insomnia F51.01    Anxiety F41.9    Hypogonadotropic hypogonadism (HCC) E23.0    Hypertriglyceridemia E78.1    HTN (hypertension) I10    ZIA (obstructive sleep apnea) G47.33    Overweight E66.3    Insomnia G47.00    TIA (transient ischemic attack) G45.9    Hypotestosteronism E34.9 _________________________________________________________  Past Medical History:   Diagnosis Date    Anxiety     Depression     Diarrhea     for OR 20     HTN (hypertension)     Hypertriglyceridemia     Hypogonadotropic hypogonadism (HCC)     Insomnia     ZIA (obstructive sleep apnea)     no CPAP     Overweight        Family History   Problem Relation Age of Onset    No Known Problems Mother     Alcohol Abuse Father     Other Father          meningitis, low testosterone    Depression Daughter     ADHD Daughter        Past Surgical History:   Procedure Laterality Date    COLONOSCOPY  2012    tubular adenoma and tubulovillous adenoma.  COLONOSCOPY N/A 2020    COLORECTAL CANCER SCREENING, NOT HIGH RISK performed [facility]h Rossi [facility]      FOOT SURGERY      left     KNEE ARTHROSCOPY      left     TONSILLECTOMY  1972       Social History     Tobacco Use    Smoking status: Former Smoker     Packs/day: 1.00     Years: 8.00     Pack years: 8.00     Types: Cigarettes     Quit date:      Years since quittin.1    Smokeless tobacco: Never Used   Substance Use Topics    Alcohol use: Yes     Comment: occasional     Drug use: No       Chart reviewed and updated where appropriate for PMH, Fam, and Soc Hx.  _________________________________________________________  ROS: POSITIVE: As in the HPI. Otherwise Pertinent negatives are negative.    __________________________________________________________  Physical Exam   BP (!) 148/80 (Site: Left Upper Arm, Position: Sitting, Cuff Size: Large Adult)   Pulse 89   Temp 97.9 °F (36.6 °C) (Temporal)   Ht 6' (1.829 m)   Wt 224 lb (101.6 kg)   SpO2 96%   BMI 30.38 kg/m²   Wt Readings from Last 3 Encounters:   21 224 lb (101.6 kg)   21 222 lb (100.7 kg)   10/20/20 224 lb (101.6 kg)       Constitutional:    He is oriented to person, place, and time. He appears well-developed and well-nourished. Cardiovascular:    Normal rate, regular rhythm and normal heart sounds. No murmur. No gallop and no friction rub. Pulmonary/Chest:    Effort normal and breath sounds normal.    No wheezes. No rales or rhonchi. Abdominal:    Soft. Bowel sounds are normal.    No distension. No tenderness. Musculoskeletal:    Normal range of motion. No joint swelling noted. No peripheral edema. Skin:    Skin is warm and dry. No rashes, lesions. Psychiatric:    He has a normal mood and affect. Normal groom and dress. No SI or HI.   ________________________________________________________    This note may have been created using dictation software.  Efforts were made to reduce grammatical or syntax errors, but some may persist.

## 2021-03-11 DIAGNOSIS — E23.0 HYPOGONADOTROPIC HYPOGONADISM (HCC): ICD-10-CM

## 2021-03-16 LAB
SEX HORMONE BINDING GLOBULIN: 19 NMOL/L (ref 11–80)
TESTOSTERONE FREE-NONMALE: 169.8 PG/ML (ref 47–244)
TESTOSTERONE TOTAL: 601 NG/DL (ref 220–1000)

## 2021-03-22 LAB
ESTRADIOL LEVEL: 36.2 PG/ML (ref 10–42)
ESTROGEN TOTAL: 74.5 PG/ML (ref 19–69)
ESTRONE: 38.3 PG/ML (ref 9–36)

## 2021-03-23 ENCOUNTER — OFFICE VISIT (OUTPATIENT)
Dept: FAMILY MEDICINE CLINIC | Age: 56
End: 2021-03-23
Payer: COMMERCIAL

## 2021-03-23 VITALS
HEIGHT: 72 IN | SYSTOLIC BLOOD PRESSURE: 138 MMHG | TEMPERATURE: 98.1 F | WEIGHT: 224 LBS | DIASTOLIC BLOOD PRESSURE: 80 MMHG | BODY MASS INDEX: 30.34 KG/M2 | HEART RATE: 95 BPM | OXYGEN SATURATION: 97 %

## 2021-03-23 DIAGNOSIS — E34.9 HYPOTESTOSTERONISM: Primary | ICD-10-CM

## 2021-03-23 DIAGNOSIS — F33.1 MODERATE EPISODE OF RECURRENT MAJOR DEPRESSIVE DISORDER (HCC): ICD-10-CM

## 2021-03-23 DIAGNOSIS — F51.01 PRIMARY INSOMNIA: ICD-10-CM

## 2021-03-23 DIAGNOSIS — I10 ESSENTIAL HYPERTENSION: ICD-10-CM

## 2021-03-23 PROCEDURE — 99214 OFFICE O/P EST MOD 30 MIN: CPT | Performed by: FAMILY MEDICINE

## 2021-03-23 RX ORDER — HYDRALAZINE HYDROCHLORIDE 10 MG/1
10 TABLET, FILM COATED ORAL 2 TIMES DAILY
Qty: 60 TABLET | Refills: 0 | Status: SHIPPED
Start: 2021-03-23 | End: 2021-04-22

## 2021-03-23 RX ORDER — DESVENLAFAXINE 50 MG/1
50 TABLET, EXTENDED RELEASE ORAL DAILY
Qty: 90 TABLET | Refills: 1 | Status: SHIPPED
Start: 2021-03-23 | End: 2021-06-28

## 2021-03-23 RX ORDER — CLONAZEPAM 0.5 MG/1
.25-.5 TABLET ORAL NIGHTLY PRN
Qty: 30 TABLET | Refills: 0 | Status: SHIPPED
Start: 2021-03-23 | End: 2021-04-14 | Stop reason: SDUPTHER

## 2021-03-23 NOTE — PROGRESS NOTES
[facility]  Office Progress Note - Dr. Nila Vila  3/23/21    CC:   Chief Complaint   Patient presents with    Hypertension    Depression        HPI:   Hypertension  Follow-up  Blood pressure is borderline controlled today. Has bene seeing elevated readings at home regularly ,not near goal, but maybe 10 pounds lower with inc in diltiazem last month. Pulse 95 today. BP Readings from Last 3 Encounters:   03/23/21 138/80   02/22/21 (!) 148/80   01/21/21 (!) 142/86     Patient continues medications regularly. Compliance is good. Denies CP, sob, abd pain, headaches, vision changes, dizziness, hypotensive symptoms. No side effects from medications noted. Mood has bene pretty positive over past month  \"I think its a keeper\"  Continues pristiq 50mg. Mild sexual side effects  He feels like was not absorbing, seeing swollen capsules in stool that did not appear to have absorbed. So he cut them in half and has been taking BID and feels the difference. slepe has bene improved, not great but improved. Hypotestosteronism. He is feeling all right. Sexual function is fair. Results for Hernandez Gaitan (MRN [id number]) as of 3/23/2021 13:41   Ref. Range 3/11/2021 15:46   Estradiol Latest Ref Range: 10.0 - 42.0 pg/mL 36.2   Estrone Latest Ref Range: 9.0 - 36.0 pg/mL 38.3 (H)   Sex Hormone Binding Latest Ref Range: 11 - 80 nmol/L 19   Testosterone Latest Ref Range: 220 - 1,000 ng/dL 601   Testosterone, Free Latest Ref Range: 47 - 244 pg/mL 169.8   Estrogen Total Latest Ref Range: 19.0 - 69.0 pg/mL 74.5 (H)   Levels are starting to get a little high again. Estrogen level starting to climb from higher amounts. He has been considering not washing film off from the previous day        _________________________________________________________    Assessment / Irais Cramer was seen today for hypertension and depression.     Diagnoses and all orders for this visit:    Hypotestosteronism  His testosterone and estrogen are starting to trend up again, even after a slight increase in dose. Going to have him take just 1 packet of testosterone gel daily over the next week, and then: Then Sunday next week start new regimen. He will alternate 9 and 10 testosterone packets weekly every other week. 2 packets two days weekly, one packet on the other days. The following week 2 packets three days weekly, one packet other days. The third week, 2 packets two days weekly, one packet other days. The fourth week 2 packets three days weekly, one packet other days. And so on. Primary insomnia, stable  -Continue    clonazePAM (KLONOPIN) 0.5 MG tablet; Take 0.5-1 tablets by mouth nightly as needed (sleep) for up to 30 days. Essential hypertension, still not controlled with regular elevations at home  -Start    hydrALAZINE (APRESOLINE) 10 MG tablet; Take 1 tablet by mouth 2 times daily  Discussed common side effects of this medication and problems that would necessitate calling the office for advice or stopping the medication. All questions answered. Continue diltiazem at current dose. Tolerating higher dose without bradycardia. Wants to avoid beta-blockers. Moderate episode of recurrent major depressive disorder (HCC)  Mood has been good over the past couple of months with the current regimen. Would like to continue Pristiq at the same dose. Other orders  -     desvenlafaxine succinate (PRISTIQ) 50 MG TB24 extended release tablet; Take 1 tablet by mouth daily    Return in about 1 month (around 4/23/2021). or as scheduled. Patient counseled to follow up sooner or seek more acute care if symptoms worsening or not improving according to plan.      Electronically signed by Jori Loera MD on 3/23/2021    _________________________________________________________  Current Outpatient Medications on File Prior to Visit   Medication Sig Dispense Refill    dilTIAZem (CARDIZEM CD) 240 MG extended release capsule Take 1 capsule by mouth daily 30 capsule 1    testosterone (ANDROGEL; TESTIM) 50 MG/5GM (1%) GEL 1% gel APPLY THE CONTENTS OF 2 TUBES THREE DAYS WEEKLY. ON THE OTHER DAYS, APPLY JUST ONE TUBE. 500 g 2    lamoTRIgine (LAMICTAL) 150 MG tablet Take 1 tablet by mouth daily 90 tablet 0    Blood Pressure Monitoring (OMRON 5 SERIES BP MONITOR) SHAILESH       atorvastatin (LIPITOR) 40 MG tablet Take 1 tablet by mouth nightly 90 tablet 1    vitamin D (ERGOCALCIFEROL) 1.25 MG (63844 UT) CAPS capsule Take 1 capsule by mouth once a week 12 capsule 1    Multiple Vitamins-Minerals (THERAPEUTIC MULTIVITAMIN-MINERALS) tablet Take 1 tablet by mouth daily LD 1-18-20      aspirin 81 MG EC tablet Take 1 tablet by mouth daily 30 tablet 3     No current facility-administered medications on file prior to visit. Patient Active Problem List   Diagnosis Code    Moderate episode of recurrent major depressive disorder (HCC) F33.1    Primary insomnia F51.01    Anxiety F41.9    Hypogonadotropic hypogonadism (HCC) E23.0    Hypertriglyceridemia E78.1    HTN (hypertension) I10    ZIA (obstructive sleep apnea) G47.33    Overweight E66.3    Insomnia G47.00    TIA (transient ischemic attack) G45.9    Hypotestosteronism E34.9     _________________________________________________________  Past Medical History:   Diagnosis Date    Anxiety     Depression     Diarrhea     for OR 1-27-20     HTN (hypertension)     Hypertriglyceridemia     Hypogonadotropic hypogonadism (HCC)     Insomnia     ZIA (obstructive sleep apnea)     no CPAP     Overweight        Family History   Problem Relation Age of Onset    No Known Problems Mother     Alcohol Abuse Father     Other Father          meningitis, low testosterone    Depression Daughter     ADHD Daughter        Past Surgical History:   Procedure Laterality Date    COLONOSCOPY  04/05/2012    tubular adenoma and tubulovillous adenoma.      COLONOSCOPY N/A 2020    COLORECTAL CANCER SCREENING, NOT HIGH RISK performed by Tosin Nuñez MD at [facility]      FOOT SURGERY      left     KNEE ARTHROSCOPY      left     TONSILLECTOMY  1972       Social History     Tobacco Use    Smoking status: Former Smoker     Packs/day: 1.00     Years: 8.00     Pack years: 8.00     Types: Cigarettes     Quit date:      Years since quittin.2    Smokeless tobacco: Never Used   Substance Use Topics    Alcohol use: Yes     Comment: occasional     Drug use: No       Chart reviewed and updated where appropriate for PMH, Fam, and Soc Hx.  _________________________________________________________  ROS: POSITIVE: As in the HPI. Otherwise Pertinent negatives are negative.    __________________________________________________________  Physical Exam   /80 (Site: Right Upper Arm)   Pulse 95   Temp 98.1 °F (36.7 °C)   Ht 6' (1.829 m)   Wt 224 lb (101.6 kg)   SpO2 97%   BMI 30.38 kg/m²   Wt Readings from Last 3 Encounters:   21 224 lb (101.6 kg)   21 224 lb (101.6 kg)   21 222 lb (100.7 kg)       Constitutional:    He is oriented to person, place, and time. He appears well-developed and well-nourished. Eyes:    Conjunctivae are normal.    Pupils are equal, round, and reactive to light. EOMI. Neck:    Normal range of motion. No thyromegaly or nodules noted. No bruit. Cardiovascular:    Normal rate, regular rhythm and normal heart sounds. No murmur. No gallop and no friction rub. Pulmonary/Chest:    Effort normal and breath sounds normal.    No wheezes. No rales or rhonchi. Abdominal:    Soft. Bowel sounds are normal.    No distension. No tenderness. Musculoskeletal:    Normal range of motion. No joint swelling noted. No peripheral edema. Skin:    Skin is warm and dry. No rashes, lesions. Psychiatric:    He has a normal mood and affect. Normal groom and dress.  No SI or HI.   ________________________________________________________    This note may have been created using dictation software.  Efforts were made to reduce grammatical or syntax errors, but some may persist.

## 2021-03-23 NOTE — PATIENT INSTRUCTIONS
Take one packet testosterone daily through Saturday. Then Sunday next week start new regimen. 2 packets two days weekly, one packet on the other days. The following week 2 packets three days weekly, one packet other days. The third week, 2 packets two days weekly, one packet other days. The fourth week 2 packets three days weekly, one packet other days. And so on.

## 2021-04-14 DIAGNOSIS — F51.01 PRIMARY INSOMNIA: ICD-10-CM

## 2021-04-14 NOTE — TELEPHONE ENCOUNTER
Last Appointment:  3/23/2021  Future Appointments   Date Time Provider Rashel Gongora   4/26/2021  1:00 PM Stephen Boss  [address] [place] [address]

## 2021-04-14 NOTE — TELEPHONE ENCOUNTER
Last Appointment:  3/23/2021  Future Appointments   Date Time Provider Rashel Gongora   4/26/2021  1:00 PM Leida Yee  W [facility] [address]

## 2021-04-15 RX ORDER — CLONAZEPAM 0.5 MG/1
.25-.5 TABLET ORAL NIGHTLY PRN
Qty: 30 TABLET | Refills: 0 | Status: SHIPPED
Start: 2021-04-15 | End: 2021-04-22 | Stop reason: SDUPTHER

## 2021-04-15 RX ORDER — ATORVASTATIN CALCIUM 40 MG/1
40 TABLET, FILM COATED ORAL NIGHTLY
Qty: 90 TABLET | Refills: 1 | Status: SHIPPED
Start: 2021-04-15 | End: 2021-09-27 | Stop reason: SDUPTHER

## 2021-04-22 ENCOUNTER — OFFICE VISIT (OUTPATIENT)
Dept: FAMILY MEDICINE CLINIC | Age: 56
End: 2021-04-22
Payer: COMMERCIAL

## 2021-04-22 VITALS
BODY MASS INDEX: 30.61 KG/M2 | HEART RATE: 92 BPM | TEMPERATURE: 98.3 F | WEIGHT: 226 LBS | OXYGEN SATURATION: 97 % | DIASTOLIC BLOOD PRESSURE: 74 MMHG | SYSTOLIC BLOOD PRESSURE: 136 MMHG | HEIGHT: 72 IN

## 2021-04-22 DIAGNOSIS — I10 ESSENTIAL HYPERTENSION: Primary | ICD-10-CM

## 2021-04-22 DIAGNOSIS — F51.01 PRIMARY INSOMNIA: ICD-10-CM

## 2021-04-22 PROCEDURE — 99214 OFFICE O/P EST MOD 30 MIN: CPT | Performed by: FAMILY MEDICINE

## 2021-04-22 RX ORDER — HYDRALAZINE HYDROCHLORIDE 25 MG/1
25 TABLET, FILM COATED ORAL 2 TIMES DAILY
Qty: 60 TABLET | Refills: 2 | Status: SHIPPED
Start: 2021-04-22 | End: 2021-05-19

## 2021-04-22 RX ORDER — CLONAZEPAM 1 MG/1
1 TABLET ORAL NIGHTLY PRN
Qty: 30 TABLET | Refills: 0 | Status: SHIPPED
Start: 2021-04-22 | End: 2021-05-26 | Stop reason: SDUPTHER

## 2021-04-22 NOTE — PROGRESS NOTES
[facility]  Office Progress Note - Dr. Gary Urbina  4/22/21    CC:   Chief Complaint   Patient presents with    Hypertension        /74 (Site: Left Upper Arm, Position: Sitting, Cuff Size: Large Adult)   Pulse 92   Temp 98.3 °F (36.8 °C) (Temporal)   Ht 6' (1.829 m)   Wt 226 lb (102.5 kg)   SpO2 97%   BMI 30.65 kg/m²   Wt Readings from Last 3 Encounters:   04/22/21 226 lb (102.5 kg)   03/23/21 224 lb (101.6 kg)   02/22/21 224 lb (101.6 kg)       HPI:   Hypertension  Follow-up  Blood pressure is borderline controlled today. Has still seen elevations at home 160s-170s occ. But no SE from adding hydralazine. BP Readings from Last 3 Encounters:   04/22/21 136/74   03/23/21 138/80   02/22/21 (!) 148/80     Patient continues medications regularly. Compliance is good. Denies CP, sob, abd pain, headaches, vision changes, dizziness, hypotensive symptoms. No side effects from medications noted. Insomnia. Worsened. Using klonopin 0.5 nightly. Long term. Weaned dose over time. Discussed Gwen Rausch, used that in past, and lunesta. Chronically anxious. Klonopin helps. Has some picking tendencies and biting of fingers. Evident on exam.   Trouble falling, asleep and staying asleep. Only sleeping for an hour or two at a time lately. Schedule is usually fall asleep 5-6 am, and try to sleep until noon. More active during the day lately and going for walks more.      _________________________________________________________    Assessment / Jack Jackson was seen today for hypertension. Diagnoses and all orders for this visit:    Essential hypertension, still regularly high at home. -  Increase   hydrALAZINE (APRESOLINE) 25 MG tablet; Take 1 tablet by mouth 2 times daily  Continue diltiazem. Many other BP meds not tolerated due to SEs. This seems to be working well for him so far. Primary insomnia, worsened lately. -  INC   clonazePAM (KLONOPIN) 1 MG tablet;  Take 1 tablet by mouth nightly as needed (sleep) for up to 30 days. Has taken long term and doing well with it. Rather than introducing new med or needing to wean the klonopin and stop, we will inc dose slightly. No more than this for long term use. Has done fine with slow reduction over time and follows requests regularly. Return in about 2 months (around 6/22/2021). or as scheduled. Patient counseled to follow up sooner or seek more acute care if symptoms worsening or not improving according to plan. Electronically signed by Yulia Loja MD on 4/22/2021    _________________________________________________________  Current Outpatient Medications on File Prior to Visit   Medication Sig Dispense Refill    atorvastatin (LIPITOR) 40 MG tablet Take 1 tablet by mouth nightly 90 tablet 1    desvenlafaxine succinate (PRISTIQ) 50 MG TB24 extended release tablet Take 1 tablet by mouth daily 90 tablet 1    dilTIAZem (CARDIZEM CD) 240 MG extended release capsule Take 1 capsule by mouth daily 30 capsule 1    testosterone (ANDROGEL; TESTIM) 50 MG/5GM (1%) GEL 1% gel APPLY THE CONTENTS OF 2 TUBES THREE DAYS WEEKLY. ON THE OTHER DAYS, APPLY JUST ONE TUBE. 500 g 2    lamoTRIgine (LAMICTAL) 150 MG tablet Take 1 tablet by mouth daily 90 tablet 0    Blood Pressure Monitoring (OMRON 5 SERIES BP MONITOR) SHAILESH       vitamin D (ERGOCALCIFEROL) 1.25 MG (05365 UT) CAPS capsule Take 1 capsule by mouth once a week 12 capsule 1    Multiple Vitamins-Minerals (THERAPEUTIC MULTIVITAMIN-MINERALS) tablet Take 1 tablet by mouth daily LD 1-18-20      aspirin 81 MG EC tablet Take 1 tablet by mouth daily 30 tablet 3     No current facility-administered medications on file prior to visit.         Patient Active Problem List   Diagnosis Code    Moderate episode of recurrent major depressive disorder (HCC) F33.1    Primary insomnia F51.01    Anxiety F41.9    Hypogonadotropic hypogonadism ([facility] [address] E23.0    Hypertriglyceridemia E78.1    HTN (hypertension) I10    ZIA (obstructive sleep apnea) G47.33    Overweight E66.3    Insomnia G47.00    TIA (transient ischemic attack) G45.9    Hypotestosteronism E34.9     _________________________________________________________  Past Medical History:   Diagnosis Date    Anxiety     Depression     Diarrhea     for OR 20     HTN (hypertension)     Hypertriglyceridemia     Hypogonadotropic hypogonadism (HCC)     Insomnia     ZIA (obstructive sleep apnea)     no CPAP     Overweight        Family History   Problem Relation Age of Onset    No Known Problems Mother     Alcohol Abuse Father     Other Father          meningitis, low testosterone    Depression Daughter     ADHD Daughter        Past Surgical History:   Procedure Laterality Date    COLONOSCOPY  2012    tubular adenoma and tubulovillous adenoma.  COLONOSCOPY N/A 2020    COLORECTAL CANCER SCREENING, NOT HIGH RISK performed by Janice Mendes [facility] at [facility]      FOOT SURGERY      left     KNEE ARTHROSCOPY      left     TONSILLECTOMY  1972       Social History     Tobacco Use    Smoking status: Former Smoker     Packs/day: 1.00     Years: 8.00     Pack years: 8.00     Types: Cigarettes     Quit date:      Years since quittin.3    Smokeless tobacco: Never Used   Substance Use Topics    Alcohol use: Yes     Comment: occasional     Drug use: No       Chart reviewed and updated where appropriate for PMH, Fam, and Soc Hx.  _________________________________________________________  ROS: POSITIVE: As in the HPI. Otherwise Pertinent negatives are negative.    __________________________________________________________  Physical Exam   Constitutional:    He is oriented to person, place, and time. He appears well-developed and well-nourished. Cardiovascular:    Normal rate, regular rhythm and normal heart sounds. No murmur. No gallop and no friction rub. Pulmonary/Chest:    Effort normal and breath sounds normal.    No wheezes. No rales or rhonchi. Abdominal:    Soft. Bowel sounds are normal.    No distension. No tenderness. Musculoskeletal:    Normal range of motion. No joint swelling noted. No peripheral edema. Skin:    Skin is warm and dry. No rashes, lesions. Psychiatric:    He has an anxious mood and affect. Normal groom and dress. No SI or HI.   ________________________________________________________    This note may have been created using dictation software.  Efforts were made to reduce errors, but some may persist.

## 2021-04-29 RX ORDER — DILTIAZEM HYDROCHLORIDE 240 MG/1
240 CAPSULE, COATED, EXTENDED RELEASE ORAL DAILY
Qty: 30 CAPSULE | Refills: 5 | Status: SHIPPED
Start: 2021-04-29 | End: 2021-06-28 | Stop reason: SDUPTHER

## 2021-04-29 NOTE — TELEPHONE ENCOUNTER
Last Appointment:  4/22/2021  Future Appointments   Date Time Provider Rashel Gongora   6/24/2021  1:00 PM William Frost  [address] [place] [address]

## 2021-05-14 RX ORDER — LAMOTRIGINE 150 MG/1
150 TABLET ORAL DAILY
Qty: 90 TABLET | Refills: 0 | Status: SHIPPED
Start: 2021-05-14 | End: 2021-08-09 | Stop reason: SDUPTHER

## 2021-05-14 NOTE — TELEPHONE ENCOUNTER
Last Appointment:  4/22/2021  Future Appointments   Date Time Provider Rashel Gongora   6/24/2021  1:00 PM Sai Galindo MD [facility]      Refill requested

## 2021-05-17 ENCOUNTER — PATIENT MESSAGE (OUTPATIENT)
Dept: FAMILY MEDICINE CLINIC | Age: 56
End: 2021-05-17

## 2021-05-17 DIAGNOSIS — I10 ESSENTIAL HYPERTENSION: ICD-10-CM

## 2021-05-18 NOTE — TELEPHONE ENCOUNTER
From: Gerry Hurtado  To: Ghulam Tan MD  Sent: 5/17/2021 9:40 PM EDT  Subject: Non-Urgent Medical Question    Update on BP, still not good. Some days may get into the 160's , most 170, hit over 180 on a resting today. Checking two machines against each other to see if in error. Close enough together to call it correct. Advise/changes?

## 2021-05-19 DIAGNOSIS — I10 ESSENTIAL HYPERTENSION: ICD-10-CM

## 2021-05-19 RX ORDER — HYDRALAZINE HYDROCHLORIDE 25 MG/1
25 TABLET, FILM COATED ORAL 3 TIMES DAILY
Qty: 60 TABLET | Refills: 2
Start: 2021-05-19 | End: 2021-05-20

## 2021-05-20 RX ORDER — HYDRALAZINE HYDROCHLORIDE 25 MG/1
25 TABLET, FILM COATED ORAL 3 TIMES DAILY
Qty: 90 TABLET | Refills: 2 | Status: SHIPPED
Start: 2021-05-20 | End: 2021-06-28

## 2021-05-26 DIAGNOSIS — F51.01 PRIMARY INSOMNIA: ICD-10-CM

## 2021-05-27 RX ORDER — CLONAZEPAM 1 MG/1
1 TABLET ORAL NIGHTLY PRN
Qty: 30 TABLET | Refills: 0 | Status: SHIPPED
Start: 2021-05-27 | End: 2021-06-28 | Stop reason: SDUPTHER

## 2021-06-28 ENCOUNTER — OFFICE VISIT (OUTPATIENT)
Dept: FAMILY MEDICINE CLINIC | Age: 56
End: 2021-06-28
Payer: COMMERCIAL

## 2021-06-28 VITALS
HEIGHT: 72 IN | DIASTOLIC BLOOD PRESSURE: 64 MMHG | SYSTOLIC BLOOD PRESSURE: 130 MMHG | HEART RATE: 103 BPM | WEIGHT: 224 LBS | OXYGEN SATURATION: 96 % | TEMPERATURE: 98.4 F | BODY MASS INDEX: 30.34 KG/M2

## 2021-06-28 DIAGNOSIS — E23.0 HYPOGONADOTROPIC HYPOGONADISM (HCC): ICD-10-CM

## 2021-06-28 DIAGNOSIS — I10 ESSENTIAL HYPERTENSION: Primary | ICD-10-CM

## 2021-06-28 DIAGNOSIS — E34.9 HYPOTESTOSTERONISM: ICD-10-CM

## 2021-06-28 DIAGNOSIS — F33.1 MODERATE EPISODE OF RECURRENT MAJOR DEPRESSIVE DISORDER (HCC): ICD-10-CM

## 2021-06-28 DIAGNOSIS — F51.01 PRIMARY INSOMNIA: ICD-10-CM

## 2021-06-28 LAB
ALBUMIN SERPL-MCNC: 4.2 G/DL (ref 3.5–5.2)
ALP BLD-CCNC: 65 U/L (ref 40–129)
ALT SERPL-CCNC: 25 U/L (ref 0–40)
ANION GAP SERPL CALCULATED.3IONS-SCNC: 16 MMOL/L (ref 7–16)
AST SERPL-CCNC: 18 U/L (ref 0–39)
BASOPHILS ABSOLUTE: 0.05 E9/L (ref 0–0.2)
BASOPHILS RELATIVE PERCENT: 0.8 % (ref 0–2)
BILIRUB SERPL-MCNC: 0.2 MG/DL (ref 0–1.2)
BUN BLDV-MCNC: 16 MG/DL (ref 6–20)
CALCIUM SERPL-MCNC: 8.8 MG/DL (ref 8.6–10.2)
CHLORIDE BLD-SCNC: 104 MMOL/L (ref 98–107)
CHOLESTEROL, TOTAL: 133 MG/DL (ref 0–199)
CO2: 23 MMOL/L (ref 22–29)
CREAT SERPL-MCNC: 0.8 MG/DL (ref 0.7–1.2)
EOSINOPHILS ABSOLUTE: 0.29 E9/L (ref 0.05–0.5)
EOSINOPHILS RELATIVE PERCENT: 4.8 % (ref 0–6)
GFR AFRICAN AMERICAN: >60
GFR NON-AFRICAN AMERICAN: >60 ML/MIN/1.73
GLUCOSE BLD-MCNC: 127 MG/DL (ref 74–99)
HCT VFR BLD CALC: 46.2 % (ref 37–54)
HDLC SERPL-MCNC: 47 MG/DL
HEMOGLOBIN: 14.9 G/DL (ref 12.5–16.5)
IMMATURE GRANULOCYTES #: 0.01 E9/L
IMMATURE GRANULOCYTES %: 0.2 % (ref 0–5)
LDL CHOLESTEROL CALCULATED: 45 MG/DL (ref 0–99)
LYMPHOCYTES ABSOLUTE: 1.4 E9/L (ref 1.5–4)
LYMPHOCYTES RELATIVE PERCENT: 23.2 % (ref 20–42)
MCH RBC QN AUTO: 30.8 PG (ref 26–35)
MCHC RBC AUTO-ENTMCNC: 32.3 % (ref 32–34.5)
MCV RBC AUTO: 95.7 FL (ref 80–99.9)
MONOCYTES ABSOLUTE: 0.63 E9/L (ref 0.1–0.95)
MONOCYTES RELATIVE PERCENT: 10.4 % (ref 2–12)
NEUTROPHILS ABSOLUTE: 3.65 E9/L (ref 1.8–7.3)
NEUTROPHILS RELATIVE PERCENT: 60.6 % (ref 43–80)
PDW BLD-RTO: 13.9 FL (ref 11.5–15)
PLATELET # BLD: 276 E9/L (ref 130–450)
PMV BLD AUTO: 10 FL (ref 7–12)
POTASSIUM SERPL-SCNC: 3.8 MMOL/L (ref 3.5–5)
RBC # BLD: 4.83 E12/L (ref 3.8–5.8)
SODIUM BLD-SCNC: 143 MMOL/L (ref 132–146)
TOTAL PROTEIN: 7.1 G/DL (ref 6.4–8.3)
TRIGL SERPL-MCNC: 203 MG/DL (ref 0–149)
TSH SERPL DL<=0.05 MIU/L-ACNC: 1.61 UIU/ML (ref 0.27–4.2)
VLDLC SERPL CALC-MCNC: 41 MG/DL
WBC # BLD: 6 E9/L (ref 4.5–11.5)

## 2021-06-28 PROCEDURE — 99214 OFFICE O/P EST MOD 30 MIN: CPT | Performed by: FAMILY MEDICINE

## 2021-06-28 RX ORDER — BUPROPION HYDROCHLORIDE 300 MG/1
300 TABLET ORAL EVERY MORNING
COMMUNITY
End: 2021-12-06 | Stop reason: SDUPTHER

## 2021-06-28 RX ORDER — CLONAZEPAM 1 MG/1
1 TABLET ORAL NIGHTLY PRN
Qty: 30 TABLET | Refills: 0 | Status: SHIPPED
Start: 2021-06-28 | End: 2021-07-21 | Stop reason: SDUPTHER

## 2021-06-28 RX ORDER — HYDRALAZINE HYDROCHLORIDE 50 MG/1
50 TABLET, FILM COATED ORAL 3 TIMES DAILY
Qty: 90 TABLET | Refills: 2 | Status: SHIPPED
Start: 2021-06-28 | End: 2021-09-27 | Stop reason: SDUPTHER

## 2021-06-28 RX ORDER — TESTOSTERONE GEL, 1% 10 MG/G
GEL TRANSDERMAL
Qty: 4 PACKAGE | Refills: 2 | Status: SHIPPED
Start: 2021-06-28 | End: 2021-07-08

## 2021-06-28 RX ORDER — DILTIAZEM HYDROCHLORIDE 240 MG/1
240 CAPSULE, COATED, EXTENDED RELEASE ORAL DAILY
Qty: 90 CAPSULE | Refills: 1 | Status: SHIPPED
Start: 2021-06-28 | End: 2021-09-27

## 2021-06-28 SDOH — ECONOMIC STABILITY: FOOD INSECURITY: WITHIN THE PAST 12 MONTHS, YOU WORRIED THAT YOUR FOOD WOULD RUN OUT BEFORE YOU GOT MONEY TO BUY MORE.: NEVER TRUE

## 2021-06-28 SDOH — ECONOMIC STABILITY: FOOD INSECURITY: WITHIN THE PAST 12 MONTHS, THE FOOD YOU BOUGHT JUST DIDN'T LAST AND YOU DIDN'T HAVE MONEY TO GET MORE.: NEVER TRUE

## 2021-06-28 ASSESSMENT — SOCIAL DETERMINANTS OF HEALTH (SDOH): HOW HARD IS IT FOR YOU TO PAY FOR THE VERY BASICS LIKE FOOD, HOUSING, MEDICAL CARE, AND HEATING?: NOT HARD AT ALL

## 2021-06-28 NOTE — PROGRESS NOTES
[facility]  Office Progress Note - Dr. Gary Urbina  6/28/21    CC:   Chief Complaint   Patient presents with    Hypertension        /64   Pulse 103   Temp 98.4 °F (36.9 °C) (Temporal)   Ht 6' (1.829 m)   Wt 224 lb (101.6 kg)   SpO2 96%   BMI 30.38 kg/m²   Wt Readings from Last 3 Encounters:   06/28/21 224 lb (101.6 kg)   04/22/21 226 lb (102.5 kg)   03/23/21 224 lb (101.6 kg)       HPI:   Hypertension  Follow-up  Blood pressure is borderline controlled today. Better on recheck. BP Readings from Last 3 Encounters:   06/28/21 130/64   04/22/21 136/74   03/23/21 138/80   has still seen 150s -170s usually at home. I think home BP cuff overestimates there. Patient continues medications regularly. Compliance is good. Denies CP, sob, abd pain, headaches, vision changes, dizziness, hypotensive symptoms. No side effects from medications noted. Has been door dashing  This has helped with scheduling and also with sleep pattern. Not perfect but he has a reason to get out of bed in the morning. No starting to get out of bed in the weekend at the anna time. chenged antidepessan around bc he was feeling too flat. Lost sex drive. Switched back to wellbutrin from pristiq . Testosterone  hypogonadic hypogonadism  Uses 120 tubes in 3 months or so. He is needing 4 boxes to last 90 day supply. Insomnia  We inc klonopin dose about 2 months ago  He is happy with it  Does feel like he is sleeping longer than normal.   He notes less tossing and turning. minimal grogginess in the morning. Usually good to go.         _________________________________________________________    Assessment / Jack Jackson was seen today for hypertension. Diagnoses and all orders for this visit:    Essential hypertension  -   INCrease  hydrALAZINE (APRESOLINE) 50 MG tablet; Take 1 tablet by mouth 3 times daily Work up to this dose over 1-2 weeks.   -   Continue  dilTIAZem (CARDIZEM CD) 240 MG extended release capsule; Take 1 capsule by mouth daily    Hypogonadotropic hypogonadism (HCC)  -     Testosterone, free, total; Future  -     ESTROGENS, FRACTIONATED; Future    Hypotestosteronism  -     testosterone (ANDROGEL; TESTIM) 50 MG/5GM (1%) GEL 1% gel; APPLY THE CONTENTS OF 2 TUBES THREE DAYS WEEKLY. ON THE OTHER DAYS, APPLY JUST ONE TUBE.  -     CBC Auto Differential; Future  -     Comprehensive Metabolic Panel; Future  -     Lipid Panel; Future  -     TSH without Reflex; Future    Update labs. Primary insomnia, improved  -  Continue   clonazePAM (KLONOPIN) 1 MG tablet; Take 1 tablet by mouth nightly as needed (sleep) for up to 30 days. Moderate episode of recurrent major depressive disorder ([facility] [address]  Switched from Pristiq to Wellbutrin since last appt due to feeling flat and sexual side effects. Continues lamictal. Doing well. Return in about 3 months (around 9/28/2021). or as scheduled. Patient counseled to follow up sooner or seek more acute care if symptoms worsening or not improving according to plan.      Electronically signed by Felice Govea MD on 6/28/2021    _________________________________________________________  Current Outpatient Medications on File Prior to Visit   Medication Sig Dispense Refill    buPROPion (WELLBUTRIN XL) 300 MG extended release tablet Take 300 mg by mouth every morning      lamoTRIgine (LAMICTAL) 150 MG tablet Take 1 tablet by mouth daily 90 tablet 0    atorvastatin (LIPITOR) 40 MG tablet Take 1 tablet by mouth nightly 90 tablet 1    Blood Pressure Monitoring (OMRON 5 SERIES BP MONITOR) SHAILESH       vitamin D (ERGOCALCIFEROL) 1.25 MG (49643 UT) CAPS capsule Take 1 capsule by mouth once a week 12 capsule 1    Multiple Vitamins-Minerals (THERAPEUTIC MULTIVITAMIN-MINERALS) tablet Take 1 tablet by mouth daily LD 1-18-20      aspirin 81 MG EC tablet Take 1 tablet by mouth daily 30 tablet 3     No current facility-administered medications on file prior to __________________________________________________________  Physical Exam   Constitutional:    He is oriented to person, place, and time. He appears well-developed and well-nourished. Neck:    Normal range of motion. No thyromegaly or nodules noted. No bruit. No LAD. Cardiovascular:    Normal rate, regular rhythm and normal heart sounds. No murmur. No gallop and no friction rub. Pulmonary/Chest:    Effort normal and breath sounds normal.    No wheezes. No rales or rhonchi. Abdominal:    Soft. Bowel sounds are normal.    No distension. No tenderness. Musculoskeletal:    Normal range of motion. No joint swelling noted. No peripheral edema. Skin:    Skin is warm and dry. No rashes, lesions. Psychiatric:    He has a normal mood and affect. Normal groom and dress. No SI or HI.   ________________________________________________________    This note may have been created using dictation software.  Efforts were made to reduce errors, but some may persist.

## 2021-06-30 LAB
SEX HORMONE BINDING GLOBULIN: 20 NMOL/L (ref 11–80)
TESTOSTERONE FREE-NONMALE: 4.9 PG/ML (ref 47–244)
TESTOSTERONE TOTAL: 21 NG/DL (ref 220–1000)

## 2021-07-05 ENCOUNTER — PATIENT MESSAGE (OUTPATIENT)
Dept: FAMILY MEDICINE CLINIC | Age: 56
End: 2021-07-05

## 2021-07-05 DIAGNOSIS — E23.0 HYPOGONADOTROPIC HYPOGONADISM (HCC): Primary | ICD-10-CM

## 2021-07-05 LAB
ESTRADIOL LEVEL: 3.8 PG/ML (ref 10–42)
ESTROGEN TOTAL: 14.2 PG/ML (ref 19–69)
ESTRONE: 10.4 PG/ML (ref 9–36)

## 2021-07-06 NOTE — TELEPHONE ENCOUNTER
From: Sergei Smith  To: Julissa Gomez MD  Sent: 7/5/2021 7:30 PM EDT  Subject: Non-Urgent Medical Question    Well I see the numbers on my test results, not good. MARCELL why my testosterone is so low unless it's absorbing again. What are your orders?

## 2021-07-08 RX ORDER — TESTOSTERONE 4 MG/D
1 PATCH TRANSDERMAL DAILY
Qty: 90 PATCH | Refills: 0 | Status: SHIPPED
Start: 2021-07-08 | End: 2021-09-27 | Stop reason: SDUPTHER

## 2021-07-08 NOTE — TELEPHONE ENCOUNTER
I sent Androderm prescription to his mail order pharmacy. That will likely need prior Auth. Rationale is that he has tried multiple testosterone formulations in the past.  Injections have given him mood fluctuations and fluctuating testosterone levels. He already has some anxiety and depression mood disorders and injectable testosterone is not a safe formulation for him. Most recently AndroGel had been working, but was leading to wide variations in his testosterone levels. He has been high and low on similar doses of medicine and most recently his level was very low which makes me suspect he is not absorbing it well. This was causing physical and emotional changes with his variations. Androderm patch will allow us to apply a uniform amount of testosterone daily with more predictable absorption.

## 2021-07-21 DIAGNOSIS — F51.01 PRIMARY INSOMNIA: ICD-10-CM

## 2021-07-21 NOTE — TELEPHONE ENCOUNTER
Last Appointment:  6/28/2021  Future Appointments   Date Time Provider Rashel Gongora   9/27/2021  9:20 AM Irene Campos  W [facility] [address]

## 2021-07-22 RX ORDER — CLONAZEPAM 1 MG/1
1 TABLET ORAL NIGHTLY PRN
Qty: 30 TABLET | Refills: 0 | Status: SHIPPED
Start: 2021-07-22 | End: 2021-08-19 | Stop reason: SDUPTHER

## 2021-08-09 RX ORDER — LAMOTRIGINE 150 MG/1
150 TABLET ORAL DAILY
Qty: 90 TABLET | Refills: 0 | Status: SHIPPED
Start: 2021-08-09 | End: 2021-11-02 | Stop reason: SDUPTHER

## 2021-08-09 RX ORDER — ERGOCALCIFEROL 1.25 MG/1
50000 CAPSULE ORAL WEEKLY
Qty: 12 CAPSULE | Refills: 1 | Status: SHIPPED
Start: 2021-08-09 | End: 2022-01-31 | Stop reason: SDUPTHER

## 2021-08-09 NOTE — TELEPHONE ENCOUNTER
Last Appointment:  6/28/2021  Future Appointments   Date Time Provider Rashel Gongora   9/27/2021  9:20 AM Jes Kate  [address]

## 2021-08-09 NOTE — TELEPHONE ENCOUNTER
Last Appointment:  6/28/2021  Future Appointments   Date Time Provider Rashel Gongora   9/27/2021  9:20 AM Ev Up  [address]

## 2021-08-19 DIAGNOSIS — F51.01 PRIMARY INSOMNIA: ICD-10-CM

## 2021-08-20 NOTE — TELEPHONE ENCOUNTER
Last Appointment:  6/28/2021  Future Appointments   Date Time Provider Rashel Gongora   9/27/2021  9:20 AM Mortimer Safe,  [address]

## 2021-08-21 RX ORDER — CLONAZEPAM 1 MG/1
1 TABLET ORAL NIGHTLY PRN
Qty: 30 TABLET | Refills: 0 | Status: SHIPPED
Start: 2021-08-21 | End: 2021-09-15 | Stop reason: SDUPTHER

## 2021-08-29 ENCOUNTER — PATIENT MESSAGE (OUTPATIENT)
Dept: FAMILY MEDICINE CLINIC | Age: 56
End: 2021-08-29

## 2021-08-29 DIAGNOSIS — E23.0 HYPOGONADOTROPIC HYPOGONADISM (HCC): Primary | ICD-10-CM

## 2021-08-30 NOTE — TELEPHONE ENCOUNTER
From: Justice Ness  To: Jesús Ocampo MD  Sent: 8/29/2021 10:36 AM EDT  Subject: Non-Urgent Medical Question    Hello doc,    I have an upcoming appointment in a few weeks. I wanted to know if we could do some bloodwork beforehand ? To check my T levels. I feel like I may not be absorbing the patch T very well, or the dose is not high enough.

## 2021-09-15 DIAGNOSIS — F51.01 PRIMARY INSOMNIA: ICD-10-CM

## 2021-09-16 RX ORDER — CLONAZEPAM 1 MG/1
1 TABLET ORAL NIGHTLY PRN
Qty: 30 TABLET | Refills: 0 | Status: SHIPPED
Start: 2021-09-16 | End: 2021-10-12 | Stop reason: SDUPTHER

## 2021-09-16 NOTE — TELEPHONE ENCOUNTER
Last Appointment:  6/28/2021  Future Appointments   Date Time Provider Rashel Gongora   9/27/2021  9:20 AM Jameel Yo  [address] [place] [address]

## 2021-09-27 ENCOUNTER — OFFICE VISIT (OUTPATIENT)
Dept: FAMILY MEDICINE CLINIC | Age: 56
End: 2021-09-27
Payer: COMMERCIAL

## 2021-09-27 VITALS
SYSTOLIC BLOOD PRESSURE: 134 MMHG | BODY MASS INDEX: 30.2 KG/M2 | DIASTOLIC BLOOD PRESSURE: 78 MMHG | WEIGHT: 223 LBS | TEMPERATURE: 97.7 F | HEIGHT: 72 IN | OXYGEN SATURATION: 97 % | HEART RATE: 92 BPM

## 2021-09-27 DIAGNOSIS — E23.0 HYPOGONADOTROPIC HYPOGONADISM (HCC): Primary | ICD-10-CM

## 2021-09-27 DIAGNOSIS — I10 ESSENTIAL HYPERTENSION: ICD-10-CM

## 2021-09-27 DIAGNOSIS — M25.522 LEFT ELBOW PAIN: ICD-10-CM

## 2021-09-27 DIAGNOSIS — Z23 NEED FOR INFLUENZA VACCINATION: ICD-10-CM

## 2021-09-27 PROCEDURE — 99214 OFFICE O/P EST MOD 30 MIN: CPT | Performed by: FAMILY MEDICINE

## 2021-09-27 PROCEDURE — 90471 IMMUNIZATION ADMIN: CPT | Performed by: FAMILY MEDICINE

## 2021-09-27 PROCEDURE — 90674 CCIIV4 VAC NO PRSV 0.5 ML IM: CPT | Performed by: FAMILY MEDICINE

## 2021-09-27 RX ORDER — HYDRALAZINE HYDROCHLORIDE 50 MG/1
50 TABLET, FILM COATED ORAL 3 TIMES DAILY
Qty: 270 TABLET | Refills: 1 | Status: SHIPPED
Start: 2021-09-27 | End: 2022-04-05

## 2021-09-27 RX ORDER — TESTOSTERONE 2 MG/D
2 PATCH TRANSDERMAL DAILY
Qty: 90 PATCH | Refills: 0 | Status: SHIPPED
Start: 2021-09-27 | End: 2021-12-23 | Stop reason: SDUPTHER

## 2021-09-27 RX ORDER — TESTOSTERONE 4 MG/D
1 PATCH TRANSDERMAL DAILY
Qty: 90 PATCH | Refills: 0 | Status: SHIPPED
Start: 2021-09-27 | End: 2021-12-23 | Stop reason: SDUPTHER

## 2021-09-27 RX ORDER — ATORVASTATIN CALCIUM 40 MG/1
40 TABLET, FILM COATED ORAL NIGHTLY
Qty: 90 TABLET | Refills: 1 | Status: SHIPPED
Start: 2021-09-27 | End: 2021-12-23 | Stop reason: SDUPTHER

## 2021-09-27 RX ORDER — DILTIAZEM HYDROCHLORIDE 180 MG/1
180 CAPSULE, COATED, EXTENDED RELEASE ORAL 2 TIMES DAILY
Qty: 60 CAPSULE | Refills: 1 | Status: SHIPPED
Start: 2021-09-27 | End: 2021-11-28 | Stop reason: SDUPTHER

## 2021-09-27 NOTE — PROGRESS NOTES
[facility]  Office Progress Note - Dr. Barbi Kapadia  9/27/21    CC:   Chief Complaint   Patient presents with    Hypertension        /78 (Site: Left Upper Arm, Position: Sitting, Cuff Size: Large Adult)   Pulse 92   Temp 97.7 °F (36.5 °C) (Temporal)   Ht 6' (1.829 m)   Wt 223 lb (101.2 kg)   SpO2 97%   BMI 30.24 kg/m²   Wt Readings from Last 3 Encounters:   09/27/21 223 lb (101.2 kg)   06/28/21 224 lb (101.6 kg)   04/22/21 226 lb (102.5 kg)       HPI:   Hypertension  Follow-up  Blood pressure is controlled today. BP Readings from Last 3 Encounters:   09/27/21 134/78   06/28/21 130/64   04/22/21 136/74   Home cuff about 10 points higher than our reading. Home readings regularly 150s-170s, worse toward the end of the day. Patient continues medications regularly. Compliance is good. Denies CP, sob, abd pain, headaches, vision changes, dizziness, hypotensive symptoms. No side effects from medications noted. cardizem 240 once daily. Hydralazine 50 TID. Low T  Switch to patch from gel due to variable absoprtion and fluctuating levels. First check on the 4mg patch has him at \"  Results for Hannah Marquez (MRN [id number]) as of 9/27/2021 11:39   Ref. Range 8/31/2021 14:28   Sex Hormone Binding Latest Ref Range: 11 - 80 nmol/L 20   Testosterone Latest Ref Range: 220 - 1,000 ng/dL 266   Testosterone, Free Latest Ref Range: 47 - 244 pg/mL 67.1   He feels low and sexual function is affected. Left elbow and shoulder pain  Has bene ongoing for about 3 months or so. No specific injury  No regularly repetitive motion that he can attribute to. Pain mostly on the posterior elbow. Mild  strength weakening. no radicular symptoms. No ttp over the medial or lat epicondyles. Resisted pronation reproduces pain in the elow.    Rotator cuff signs neg and impingement signs neg.     _________________________________________________________    Assessment / Ya Celaya was seen today for hypertension. Diagnoses and all orders for this visit:    Hypogonadotropic hypogonadism ([facility] [address] under-treated. Goal T >400.   - CONTINUE    Testosterone (ANDRODERM) 4 MG/24HR PT24; Place 1 patch onto the skin daily for 90 days. Every 24 hours. Remove previous days patch. Rotate sites. Total 6mg daily.  - ADD    Testosterone (ANDRODERM) 2 MG/24HR PT24; Place 2 mg onto the skin daily for 90 days. Total 6mg daily. Left elbow pain  -     XR ELBOW LEFT (MIN 3 VIEWS); Future  No specific injury  Does not seem like epicondylitis. Will image to see if can find a source. Essential hypertension, ok control in office, home readings regularly elevated. -  INC total daily dose and split to BID for more stable pressures hopefully. dilTIAZem (CARDIZEM CD) 180 MG extended release capsule; Take 1 capsule by mouth 2 times daily For blood pressure  - continue same . hydrALAZINE (APRESOLINE) 50 MG tablet; Take 1 tablet by mouth 3 times daily    Need for influenza vaccination  -     INFLUENZA, MDCK QUADV, 2 YRS AND OLDER, IM, PF, PREFILL SYR OR SDV, 0.5ML (FLUCELVAX QUADV, PF)    Other orders  -   refill  atorvastatin (LIPITOR) 40 MG tablet; Take 1 tablet by mouth nightly    Return in about 2 months (around 11/27/2021). or as scheduled. Patient counseled to follow up sooner or seek more acute care if symptoms worsening or not improving according to plan. Electronically signed by Gordon White MD on 9/27/2021    _________________________________________________________  Current Outpatient Medications on File Prior to Visit   Medication Sig Dispense Refill    clonazePAM (KLONOPIN) 1 MG tablet Take 1 tablet by mouth nightly as needed (sleep) for up to 30 days.  30 tablet 0    lamoTRIgine (LAMICTAL) 150 MG tablet Take 1 tablet by mouth daily 90 tablet 0    vitamin D (ERGOCALCIFEROL) 1.25 MG (40332 UT) CAPS capsule Take 1 capsule by mouth once a week 12 capsule 1    buPROPion (WELLBUTRIN XL) 300 MG extended release tablet Take 300 mg by mouth every morning      Blood Pressure Monitoring (OMRON 5 SERIES BP MONITOR) SHAILESH       Multiple Vitamins-Minerals (THERAPEUTIC MULTIVITAMIN-MINERALS) tablet Take 1 tablet by mouth daily LD 20      aspirin 81 MG EC tablet Take 1 tablet by mouth daily 30 tablet 3     No current facility-administered medications on file prior to visit. Patient Active Problem List   Diagnosis Code    Moderate episode of recurrent major depressive disorder (HCC) F33.1    Primary insomnia F51.01    Anxiety F41.9    Hypogonadotropic hypogonadism (HCC) E23.0    Hypertriglyceridemia E78.1    HTN (hypertension) I10    ZIA (obstructive sleep apnea) G47.33    Overweight E66.3    Insomnia G47.00    TIA (transient ischemic attack) G45.9    Hypotestosteronism E34.9     _________________________________________________________  Past Medical History:   Diagnosis Date    Anxiety     Depression     Diarrhea     for OR 20     HTN (hypertension)     Hypertriglyceridemia     Hypogonadotropic hypogonadism (HCC)     Insomnia     ZIA (obstructive sleep apnea)     no CPAP     Overweight        Family History   Problem Relation Age of Onset    No Known Problems Mother     Alcohol Abuse Father     Other Father          meningitis, low testosterone    Depression Daughter     ADHD Daughter        Past Surgical History:   Procedure Laterality Date    COLONOSCOPY  2012    tubular adenoma and tubulovillous adenoma.      COLONOSCOPY N/A 2020    COLORECTAL CANCER SCREENING, NOT HIGH RISK performed by Timmy [facility]ina [facility] at [facility]      FOOT SURGERY      left     KNEE ARTHROSCOPY      left     TONSILLECTOMY  1972       Social History     Tobacco Use    Smoking status: Former Smoker     Packs/day: 1.00     Years: 8.00     Pack years: 8.00     Types: Cigarettes     Quit date:      Years since quittin.7    Smokeless tobacco: Never Used   Vaping Use    Vaping Use: Never used   Substance Use Topics    Alcohol use: Yes     Comment: occasional     Drug use: No       Chart reviewed and updated where appropriate for PMH, Fam, and Soc Hx.  _________________________________________________________  ROS: POSITIVE: As in the HPI. Otherwise Pertinent negatives are negative.    __________________________________________________________  Physical Exam   Constitutional:    He is oriented to person, place, and time. He appears well-developed and well-nourished. Eyes:    Conjunctivae are normal.    Pupils are equal, round, and reactive to light. EOMI. Cardiovascular:    Normal rate, regular rhythm and normal heart sounds. No murmur. No gallop and no friction rub. Pulmonary/Chest:    Effort normal and breath sounds normal.    No wheezes. No rales or rhonchi. Abdominal:    Soft. Bowel sounds are normal.    No distension. No tenderness. Musculoskeletal:    Normal range of motion. No joint swelling noted. No peripheral edema. Neurological:    He is A&Ox3. Motor and sensation grossly intact. Normal Gait.  strength normal BL. As in HPI for rotator cuff testing. Skin:    Skin is warm and dry. No rashes, lesions. Psychiatric:    He has a low mood and affect. Normal groom and dress. No SI or HI.   ________________________________________________________    This note may have been created using dictation software.  Efforts were made to reduce errors, but some may persist.

## 2021-10-12 DIAGNOSIS — F51.01 PRIMARY INSOMNIA: ICD-10-CM

## 2021-10-13 NOTE — TELEPHONE ENCOUNTER
Last Appointment:  9/27/2021  Future Appointments   Date Time Provider Rashel Gongora   12/6/2021 10:20 AM Lynette Montague  [address]

## 2021-10-15 RX ORDER — CLONAZEPAM 1 MG/1
1 TABLET ORAL NIGHTLY PRN
Qty: 30 TABLET | Refills: 2 | Status: SHIPPED
Start: 2021-10-15 | End: 2022-01-03 | Stop reason: SDUPTHER

## 2021-11-03 NOTE — TELEPHONE ENCOUNTER
Last Appointment:  Visit date not found  Future Appointments   Date Time Provider Rashel Gongora   12/6/2021 10:20 AM Darline Munoz  [address]

## 2021-11-04 ENCOUNTER — PATIENT MESSAGE (OUTPATIENT)
Dept: FAMILY MEDICINE CLINIC | Age: 56
End: 2021-11-04

## 2021-11-04 DIAGNOSIS — E23.0 HYPOGONADOTROPIC HYPOGONADISM (HCC): Primary | ICD-10-CM

## 2021-11-04 RX ORDER — LAMOTRIGINE 150 MG/1
150 TABLET ORAL DAILY
Qty: 90 TABLET | Refills: 1 | Status: SHIPPED
Start: 2021-11-04 | End: 2021-11-11 | Stop reason: SDUPTHER

## 2021-11-04 NOTE — TELEPHONE ENCOUNTER
From: Court Vasquez  To: Leland Lindsay MD  Sent: 11/4/2021 9:47 AM EDT  Subject: Non-Urgent Medical Question    Yudelka Camargo H,    I was supposed to remind you to put in orders for my bloodwork on testosterone levels. Also, I would like to request a vitamin D check as well. Thanks.

## 2021-11-08 DIAGNOSIS — E23.0 HYPOGONADOTROPIC HYPOGONADISM (HCC): ICD-10-CM

## 2021-11-08 LAB
ALBUMIN SERPL-MCNC: 4.1 G/DL (ref 3.5–5.2)
ALP BLD-CCNC: 69 U/L (ref 40–129)
ALT SERPL-CCNC: 25 U/L (ref 0–40)
ANION GAP SERPL CALCULATED.3IONS-SCNC: 20 MMOL/L (ref 7–16)
AST SERPL-CCNC: 20 U/L (ref 0–39)
BASOPHILS ABSOLUTE: 0.06 E9/L (ref 0–0.2)
BASOPHILS RELATIVE PERCENT: 1.1 % (ref 0–2)
BILIRUB SERPL-MCNC: 0.2 MG/DL (ref 0–1.2)
BUN BLDV-MCNC: 19 MG/DL (ref 6–20)
CALCIUM SERPL-MCNC: 9.6 MG/DL (ref 8.6–10.2)
CHLORIDE BLD-SCNC: 106 MMOL/L (ref 98–107)
CO2: 16 MMOL/L (ref 22–29)
CREAT SERPL-MCNC: 0.8 MG/DL (ref 0.7–1.2)
EOSINOPHILS ABSOLUTE: 0.26 E9/L (ref 0.05–0.5)
EOSINOPHILS RELATIVE PERCENT: 4.7 % (ref 0–6)
GFR AFRICAN AMERICAN: >60
GFR NON-AFRICAN AMERICAN: >60 ML/MIN/1.73
GLUCOSE BLD-MCNC: 156 MG/DL (ref 74–99)
HCT VFR BLD CALC: 44.1 % (ref 37–54)
HEMOGLOBIN: 13.9 G/DL (ref 12.5–16.5)
IMMATURE GRANULOCYTES #: 0.03 E9/L
IMMATURE GRANULOCYTES %: 0.5 % (ref 0–5)
LYMPHOCYTES ABSOLUTE: 1.67 E9/L (ref 1.5–4)
LYMPHOCYTES RELATIVE PERCENT: 30 % (ref 20–42)
MCH RBC QN AUTO: 31.2 PG (ref 26–35)
MCHC RBC AUTO-ENTMCNC: 31.5 % (ref 32–34.5)
MCV RBC AUTO: 99.1 FL (ref 80–99.9)
MONOCYTES ABSOLUTE: 0.51 E9/L (ref 0.1–0.95)
MONOCYTES RELATIVE PERCENT: 9.2 % (ref 2–12)
NEUTROPHILS ABSOLUTE: 3.03 E9/L (ref 1.8–7.3)
NEUTROPHILS RELATIVE PERCENT: 54.5 % (ref 43–80)
PDW BLD-RTO: 14.2 FL (ref 11.5–15)
PLATELET # BLD: 257 E9/L (ref 130–450)
PMV BLD AUTO: 10.1 FL (ref 7–12)
POTASSIUM SERPL-SCNC: 4.1 MMOL/L (ref 3.5–5)
RBC # BLD: 4.45 E12/L (ref 3.8–5.8)
SODIUM BLD-SCNC: 142 MMOL/L (ref 132–146)
TOTAL PROTEIN: 7 G/DL (ref 6.4–8.3)
VITAMIN D 25-HYDROXY: 41 NG/ML (ref 30–100)
WBC # BLD: 5.6 E9/L (ref 4.5–11.5)

## 2021-11-09 ENCOUNTER — PATIENT MESSAGE (OUTPATIENT)
Dept: FAMILY MEDICINE CLINIC | Age: 56
End: 2021-11-09

## 2021-11-10 LAB
SEX HORMONE BINDING GLOBULIN: 18 NMOL/L (ref 11–80)
TESTOSTERONE FREE-NONMALE: 101.8 PG/ML (ref 47–244)
TESTOSTERONE TOTAL: 374 NG/DL (ref 220–1000)

## 2021-11-10 NOTE — TELEPHONE ENCOUNTER
From: Dina Martinez Vuletic  To: Dr. Joanne Sánchez: 11/9/2021 9:15 PM EST  Subject: new script    I recently requested a refill for my lamotrigine 150mg thru [facility], which you submitted. However, there was a problem at [employer]. Long story, red tape, christopher white. I called and fixed it, but it resulted in this refill being cancelled. Can you please submit a refill again for 90 day supply? Thank you.

## 2021-11-11 RX ORDER — LAMOTRIGINE 150 MG/1
150 TABLET ORAL DAILY
Qty: 90 TABLET | Refills: 1 | Status: SHIPPED
Start: 2021-11-11 | End: 2022-05-10 | Stop reason: SDUPTHER

## 2021-11-12 LAB
ESTRADIOL LEVEL: 17.9 PG/ML (ref 10–42)
ESTROGEN TOTAL: 33.3 PG/ML (ref 19–69)
ESTRONE: 15.4 PG/ML (ref 9–36)

## 2021-11-28 DIAGNOSIS — I10 ESSENTIAL HYPERTENSION: ICD-10-CM

## 2021-11-29 RX ORDER — DILTIAZEM HYDROCHLORIDE 180 MG/1
180 CAPSULE, COATED, EXTENDED RELEASE ORAL 2 TIMES DAILY
Qty: 180 CAPSULE | Refills: 1 | Status: SHIPPED
Start: 2021-11-29 | End: 2022-04-05 | Stop reason: SDUPTHER

## 2021-11-29 NOTE — TELEPHONE ENCOUNTER
Last Appointment:  9/27/2021  Future Appointments   Date Time Provider Rashel Gongora   12/6/2021 10:20 AM Syed Alston  [address]

## 2021-12-06 ENCOUNTER — OFFICE VISIT (OUTPATIENT)
Dept: FAMILY MEDICINE CLINIC | Age: 56
End: 2021-12-06
Payer: COMMERCIAL

## 2021-12-06 VITALS
HEART RATE: 93 BPM | HEIGHT: 72 IN | DIASTOLIC BLOOD PRESSURE: 74 MMHG | TEMPERATURE: 98.5 F | WEIGHT: 225 LBS | BODY MASS INDEX: 30.48 KG/M2 | SYSTOLIC BLOOD PRESSURE: 130 MMHG | OXYGEN SATURATION: 96 %

## 2021-12-06 DIAGNOSIS — E23.0 HYPOGONADOTROPIC HYPOGONADISM (HCC): ICD-10-CM

## 2021-12-06 DIAGNOSIS — F51.01 PRIMARY INSOMNIA: ICD-10-CM

## 2021-12-06 DIAGNOSIS — F33.1 MODERATE EPISODE OF RECURRENT MAJOR DEPRESSIVE DISORDER (HCC): ICD-10-CM

## 2021-12-06 DIAGNOSIS — I10 ESSENTIAL HYPERTENSION: Primary | ICD-10-CM

## 2021-12-06 PROCEDURE — 99214 OFFICE O/P EST MOD 30 MIN: CPT | Performed by: FAMILY MEDICINE

## 2021-12-06 RX ORDER — BUPROPION HYDROCHLORIDE 300 MG/1
300 TABLET ORAL EVERY MORNING
Qty: 90 TABLET | Refills: 3 | Status: SHIPPED
Start: 2021-12-06 | End: 2022-01-24

## 2021-12-06 NOTE — PROGRESS NOTES
[facility]  Office Progress Note - Dr. Cristina Royal  12/6/21    CC:   Chief Complaint   Patient presents with    Hypertension        /74 (Site: Left Upper Arm, Position: Sitting, Cuff Size: Large Adult)   Pulse 93   Temp 98.5 °F (36.9 °C) (Temporal)   Ht 6' (1.829 m)   Wt 225 lb (102.1 kg)   SpO2 96%   BMI 30.52 kg/m²   Wt Readings from Last 3 Encounters:   12/06/21 225 lb (102.1 kg)   09/27/21 223 lb (101.2 kg)   06/28/21 224 lb (101.6 kg)       HPI:   Hypertension  Follow-up  Blood pressure is controlled today. BP Readings from Last 3 Encounters:   12/06/21 130/74   09/27/21 134/78   06/28/21 130/64     Patient continues medications regularly. Compliance is good. Denies CP, sob, abd pain, headaches, vision changes, dizziness, hypotensive symptoms. No side effects from medications noted. Low T  Thought he was running pretty low. Relates this to taking too much Vit D. He stopped that. Has noted some occ morning erections and sex rive coming back some. Continues trouble with sleep. Has bene using melatonin, Sominex, sometimes magnesium. 1 mg klonopin over past few months. Has been working regularly - [email or web address]. Mood has bene good, stable overall. Not having many troubles with depression. Working regularly. Has motivation. Mood not very low. Thiks the current regimen of medications is working well. Just wishes he could sleep better and has been relying on higher doses of sedatives to do so.       _________________________________________________________    Assessment / [facility] was seen today for hypertension. Diagnoses and all orders for this visit:    Essential hypertension  BP controlled and stable. Home readings reviewed and I think his BP cuff overestimates given stability at our office and comparisons once which showed over estimate by about 10 systolic points.      Moderate episode of recurrent major depressive disorder (HCC)  - Continue/refill buPROPion (WELLBUTRIN XL) 300 MG extended release tablet; Take 1 tablet by mouth every morning  continue lamictal.     Hypogonadotropic hypogonadism (HCC)  Stable  Feeling a little better. Levels good on labs - on lower side than he is used to but recommend recheck in 3 months. On 6mg daily. Primary insomnia  Encouraged to decrease amount of OTC sedatives nightly as he is becoming increasingly reliant on then. Anxiety and hypervigilance are contributing to poor sleep habits, as is long term sleep med use over years to decades. Return in about 3 months (around 3/6/2022). or as scheduled. Patient counseled to follow up sooner or seek more acute care if symptoms worsening or not improving according to plan. Electronically signed by Benson Hare MD on 12/6/2021    _________________________________________________________  Current Outpatient Medications on File Prior to Visit   Medication Sig Dispense Refill    dilTIAZem (CARDIZEM CD) 180 MG extended release capsule Take 1 capsule by mouth 2 times daily For blood pressure 180 capsule 1    lamoTRIgine (LAMICTAL) 150 MG tablet Take 1 tablet by mouth daily 90 tablet 1    clonazePAM (KLONOPIN) 1 MG tablet Take 1 tablet by mouth nightly as needed (sleep) for up to 90 days. 30 tablet 2    atorvastatin (LIPITOR) 40 MG tablet Take 1 tablet by mouth nightly 90 tablet 1    Testosterone (ANDRODERM) 4 MG/24HR PT24 Place 1 patch onto the skin daily for 90 days. Every 24 hours. Remove previous days patch. Rotate sites. Total 6mg daily. 90 patch 0    Testosterone (ANDRODERM) 2 MG/24HR PT24 Place 2 mg onto the skin daily for 90 days. Total 6mg daily.  90 patch 0    hydrALAZINE (APRESOLINE) 50 MG tablet Take 1 tablet by mouth 3 times daily 270 tablet 1    vitamin D (ERGOCALCIFEROL) 1.25 MG (62382 UT) CAPS capsule Take 1 capsule by mouth once a week 12 capsule 1    Blood Pressure Monitoring (OMRON 5 SERIES BP MONITOR) SHAILESH       Multiple Vitamins-Minerals (THERAPEUTIC MULTIVITAMIN-MINERALS) tablet Take 1 tablet by mouth daily LD 20      aspirin 81 MG EC tablet Take 1 tablet by mouth daily 30 tablet 3     No current facility-administered medications on file prior to visit. Patient Active Problem List   Diagnosis Code    Moderate episode of recurrent major depressive disorder (HCC) F33.1    Primary insomnia F51.01    Anxiety F41.9    Hypogonadotropic hypogonadism (HCC) E23.0    Hypertriglyceridemia E78.1    HTN (hypertension) I10    ZIA (obstructive sleep apnea) G47.33    Overweight E66.3    Insomnia G47.00    TIA (transient ischemic attack) G45.9    Hypotestosteronism E34.9     _________________________________________________________  Past Medical History:   Diagnosis Date    Anxiety     Depression     Diarrhea     for OR 20     HTN (hypertension)     Hypertriglyceridemia     Hypogonadotropic hypogonadism (HCC)     Insomnia     ZIA (obstructive sleep apnea)     no CPAP     Overweight        Family History   Problem Relation Age of Onset    No Known Problems Mother     Alcohol Abuse Father     Other Father          meningitis, low testosterone    Depression Daughter     ADHD Daughter        Past Surgical History:   Procedure Laterality Date    COLONOSCOPY  2012    tubular adenoma and tubulovillous adenoma.      COLONOSCOPY N/A 2020    COLORECTAL CANCER SCREENING, NOT HIGH RISK performed by Anju [facility] at [facility] 2013     FOOT SURGERY      left     KNEE ARTHROSCOPY      left     TONSILLECTOMY  1972       Social History     Tobacco Use    Smoking status: Former Smoker     Packs/day: 1.00     Years: 8.00     Pack years: 8.00     Types: Cigarettes     Quit date:      Years since quittin.9    Smokeless tobacco: Never Used   Vaping Use    Vaping Use: Never used   Substance Use Topics    Alcohol use: Yes     Comment: occasional     Drug use: No       Chart reviewed and updated where appropriate for PMH, Fam, and Soc Hx.  _________________________________________________________  ROS: POSITIVE: As in the HPI. Otherwise Pertinent negatives are negative.    __________________________________________________________  Physical Exam   Constitutional:    He is oriented to person, place, and time. He appears well-developed and well-nourished. Eyes:    Conjunctivae are normal.    Pupils are equal, round, and reactive to light. EOMI. Neck:    Normal range of motion. No thyromegaly or nodules noted. No bruit. No LAD. Cardiovascular:    Normal rate, regular rhythm and normal heart sounds. No murmur. No gallop and no friction rub. Pulmonary/Chest:    Effort normal and breath sounds normal.    No wheezes. No rales or rhonchi. Abdominal:    Soft. Bowel sounds are normal.    No distension. No tenderness. Musculoskeletal:    Normal range of motion. No joint swelling noted. No peripheral edema. Neurological:    He is A&Ox3. Motor and sensation grossly intact. Normal Gait. Mild tremor. Skin:    Skin is warm and dry. No rashes, lesions. Psychiatric:    He has a normal mood and affect. Normal groom and dress. No SI or HI.   ________________________________________________________    This note may have been created using dictation software.  Efforts were made to reduce errors, but some may persist.

## 2021-12-23 DIAGNOSIS — E23.0 HYPOGONADOTROPIC HYPOGONADISM (HCC): ICD-10-CM

## 2021-12-23 RX ORDER — TESTOSTERONE 2 MG/D
2 PATCH TRANSDERMAL DAILY
Qty: 90 PATCH | Refills: 0 | Status: SHIPPED
Start: 2021-12-23 | End: 2022-06-03 | Stop reason: ALTCHOICE

## 2021-12-23 RX ORDER — ATORVASTATIN CALCIUM 40 MG/1
40 TABLET, FILM COATED ORAL NIGHTLY
Qty: 90 TABLET | Refills: 1 | Status: SHIPPED
Start: 2021-12-23 | End: 2022-05-03 | Stop reason: SDUPTHER

## 2021-12-23 RX ORDER — TESTOSTERONE 4 MG/D
1 PATCH TRANSDERMAL DAILY
Qty: 90 PATCH | Refills: 0 | Status: SHIPPED
Start: 2021-12-23 | End: 2022-06-03 | Stop reason: ALTCHOICE

## 2021-12-23 NOTE — TELEPHONE ENCOUNTER
Last Appointment:  12/6/2021  Future Appointments   Date Time Provider Rashel Gongora   3/7/2022 10:40 AM Sravani Torres  [address]

## 2022-01-03 DIAGNOSIS — F51.01 PRIMARY INSOMNIA: ICD-10-CM

## 2022-01-04 RX ORDER — CLONAZEPAM 1 MG/1
1 TABLET ORAL NIGHTLY PRN
Qty: 30 TABLET | Refills: 2 | Status: SHIPPED
Start: 2022-01-04 | End: 2022-03-28 | Stop reason: SDUPTHER

## 2022-01-04 NOTE — TELEPHONE ENCOUNTER
Last Appointment:  12/6/2021  Future Appointments   Date Time Provider Rashel Gongora   3/7/2022 10:40 AM Carlos Márquez  [address] 19-Feb-2016

## 2022-01-24 ENCOUNTER — PATIENT MESSAGE (OUTPATIENT)
Dept: FAMILY MEDICINE CLINIC | Age: 57
End: 2022-01-24

## 2022-01-24 RX ORDER — BUPROPION HYDROCHLORIDE 150 MG/1
150 TABLET, EXTENDED RELEASE ORAL 2 TIMES DAILY
Qty: 60 TABLET | Refills: 3 | Status: SHIPPED
Start: 2022-01-24 | End: 2022-03-07 | Stop reason: SDUPTHER

## 2022-01-24 NOTE — TELEPHONE ENCOUNTER
From: Irvin Smith  To: Dr. Handy Alvarado  Sent: 1/24/2022 8:19 AM EST  Subject: wellbutrin    Hello Dr Eugenie Pittman,  When I started wellbutrin, I was still seeing [facility] for my meds. She had me on wellbutrin SR ,300mg. When I switched back to you, you gave me wellbutrin XL. It would seem that their is little difference in the two. However, I ran out of SR on Monday, switched over to the XL the next day. I feel a distinct difference in how I feel, in a negative way. I have had trouble in the past, with XL pills passing thru me without dissolving. MARCELL for sure what is going on, just that things are different. Would you be willing to put me back on 300MG SR instead ? If so please call into [id number] [facility]. Any idea why SR why one work but XL not so much?

## 2022-01-31 RX ORDER — ERGOCALCIFEROL 1.25 MG/1
50000 CAPSULE ORAL WEEKLY
Qty: 12 CAPSULE | Refills: 1 | Status: SHIPPED
Start: 2022-01-31 | End: 2022-07-30 | Stop reason: SDUPTHER

## 2022-01-31 NOTE — TELEPHONE ENCOUNTER
Last Appointment:  Visit date not found  Future Appointments   Date Time Provider Rashel Gongora   3/7/2022 10:40 AM Rosi Le  [address]

## 2022-03-07 ENCOUNTER — OFFICE VISIT (OUTPATIENT)
Dept: FAMILY MEDICINE CLINIC | Age: 57
End: 2022-03-07
Payer: COMMERCIAL

## 2022-03-07 VITALS
BODY MASS INDEX: 30.48 KG/M2 | TEMPERATURE: 98.6 F | OXYGEN SATURATION: 94 % | DIASTOLIC BLOOD PRESSURE: 78 MMHG | HEART RATE: 99 BPM | HEIGHT: 72 IN | WEIGHT: 225 LBS | SYSTOLIC BLOOD PRESSURE: 136 MMHG

## 2022-03-07 DIAGNOSIS — F41.9 ANXIETY: Primary | ICD-10-CM

## 2022-03-07 DIAGNOSIS — Z12.5 SCREENING FOR MALIGNANT NEOPLASM OF PROSTATE: ICD-10-CM

## 2022-03-07 DIAGNOSIS — E23.0 HYPOGONADOTROPIC HYPOGONADISM (HCC): ICD-10-CM

## 2022-03-07 DIAGNOSIS — F33.1 MODERATE EPISODE OF RECURRENT MAJOR DEPRESSIVE DISORDER (HCC): ICD-10-CM

## 2022-03-07 DIAGNOSIS — I10 PRIMARY HYPERTENSION: ICD-10-CM

## 2022-03-07 LAB
ALBUMIN SERPL-MCNC: 4.4 G/DL (ref 3.5–5.2)
ALP BLD-CCNC: 80 U/L (ref 40–129)
ALT SERPL-CCNC: 31 U/L (ref 0–40)
ANION GAP SERPL CALCULATED.3IONS-SCNC: 15 MMOL/L (ref 7–16)
AST SERPL-CCNC: 32 U/L (ref 0–39)
BASOPHILS ABSOLUTE: 0.06 E9/L (ref 0–0.2)
BASOPHILS RELATIVE PERCENT: 0.9 % (ref 0–2)
BILIRUB SERPL-MCNC: 0.3 MG/DL (ref 0–1.2)
BUN BLDV-MCNC: 17 MG/DL (ref 6–20)
CALCIUM SERPL-MCNC: 9.4 MG/DL (ref 8.6–10.2)
CHLORIDE BLD-SCNC: 105 MMOL/L (ref 98–107)
CHOLESTEROL, TOTAL: 139 MG/DL (ref 0–199)
CO2: 23 MMOL/L (ref 22–29)
CREAT SERPL-MCNC: 0.9 MG/DL (ref 0.7–1.2)
EOSINOPHILS ABSOLUTE: 0.25 E9/L (ref 0.05–0.5)
EOSINOPHILS RELATIVE PERCENT: 3.8 % (ref 0–6)
GFR AFRICAN AMERICAN: >60
GFR NON-AFRICAN AMERICAN: >60 ML/MIN/1.73
GLUCOSE BLD-MCNC: 90 MG/DL (ref 74–99)
HCT VFR BLD CALC: 44.6 % (ref 37–54)
HDLC SERPL-MCNC: 55 MG/DL
HEMOGLOBIN: 14.1 G/DL (ref 12.5–16.5)
IMMATURE GRANULOCYTES #: 0.03 E9/L
IMMATURE GRANULOCYTES %: 0.5 % (ref 0–5)
LDL CHOLESTEROL CALCULATED: 65 MG/DL (ref 0–99)
LYMPHOCYTES ABSOLUTE: 1.81 E9/L (ref 1.5–4)
LYMPHOCYTES RELATIVE PERCENT: 27.2 % (ref 20–42)
MCH RBC QN AUTO: 30.7 PG (ref 26–35)
MCHC RBC AUTO-ENTMCNC: 31.6 % (ref 32–34.5)
MCV RBC AUTO: 97 FL (ref 80–99.9)
MONOCYTES ABSOLUTE: 0.59 E9/L (ref 0.1–0.95)
MONOCYTES RELATIVE PERCENT: 8.9 % (ref 2–12)
NEUTROPHILS ABSOLUTE: 3.92 E9/L (ref 1.8–7.3)
NEUTROPHILS RELATIVE PERCENT: 58.7 % (ref 43–80)
PDW BLD-RTO: 14 FL (ref 11.5–15)
PLATELET # BLD: 299 E9/L (ref 130–450)
PMV BLD AUTO: 9.7 FL (ref 7–12)
POTASSIUM SERPL-SCNC: 4.2 MMOL/L (ref 3.5–5)
PROSTATE SPECIFIC ANTIGEN: 0.83 NG/ML (ref 0–4)
RBC # BLD: 4.6 E12/L (ref 3.8–5.8)
SODIUM BLD-SCNC: 143 MMOL/L (ref 132–146)
TESTOSTERONE TOTAL: 276.3 NG/DL
TOTAL PROTEIN: 7.5 G/DL (ref 6.4–8.3)
TRIGL SERPL-MCNC: 93 MG/DL (ref 0–149)
VLDLC SERPL CALC-MCNC: 19 MG/DL
WBC # BLD: 6.7 E9/L (ref 4.5–11.5)

## 2022-03-07 PROCEDURE — 99214 OFFICE O/P EST MOD 30 MIN: CPT | Performed by: FAMILY MEDICINE

## 2022-03-07 RX ORDER — BUPROPION HYDROCHLORIDE 150 MG/1
150 TABLET, EXTENDED RELEASE ORAL 2 TIMES DAILY
Qty: 180 TABLET | Refills: 1 | Status: SHIPPED
Start: 2022-03-07 | End: 2022-08-13 | Stop reason: SDUPTHER

## 2022-03-07 ASSESSMENT — PATIENT HEALTH QUESTIONNAIRE - PHQ9
9. THOUGHTS THAT YOU WOULD BE BETTER OFF DEAD, OR OF HURTING YOURSELF: 0
SUM OF ALL RESPONSES TO PHQ QUESTIONS 1-9: 0
SUM OF ALL RESPONSES TO PHQ9 QUESTIONS 1 & 2: 0
SUM OF ALL RESPONSES TO PHQ QUESTIONS 1-9: 0
SUM OF ALL RESPONSES TO PHQ QUESTIONS 1-9: 0
5. POOR APPETITE OR OVEREATING: 0
SUM OF ALL RESPONSES TO PHQ QUESTIONS 1-9: 0
10. IF YOU CHECKED OFF ANY PROBLEMS, HOW DIFFICULT HAVE THESE PROBLEMS MADE IT FOR YOU TO DO YOUR WORK, TAKE CARE OF THINGS AT HOME, OR GET ALONG WITH OTHER PEOPLE: 0
6. FEELING BAD ABOUT YOURSELF - OR THAT YOU ARE A FAILURE OR HAVE LET YOURSELF OR YOUR FAMILY DOWN: 0
2. FEELING DOWN, DEPRESSED OR HOPELESS: 0
8. MOVING OR SPEAKING SO SLOWLY THAT OTHER PEOPLE COULD HAVE NOTICED. OR THE OPPOSITE, BEING SO FIGETY OR RESTLESS THAT YOU HAVE BEEN MOVING AROUND A LOT MORE THAN USUAL: 0
7. TROUBLE CONCENTRATING ON THINGS, SUCH AS READING THE NEWSPAPER OR WATCHING TELEVISION: 0
3. TROUBLE FALLING OR STAYING ASLEEP: 0
1. LITTLE INTEREST OR PLEASURE IN DOING THINGS: 0
4. FEELING TIRED OR HAVING LITTLE ENERGY: 0

## 2022-03-07 NOTE — PROGRESS NOTES
[facility]  Office Progress Note - Dr. Cristina Nassar  3/7/22    CC:   Chief Complaint   Patient presents with    Hypertension      /78 (Site: Right Upper Arm, Position: Sitting, Cuff Size: Large Adult)   Pulse 99   Temp 98.6 °F (37 °C) (Temporal)   Ht 6' (1.829 m)   Wt 225 lb (102.1 kg)   SpO2 94%   BMI 30.52 kg/m²   Wt Readings from Last 3 Encounters:   03/07/22 225 lb (102.1 kg)   12/06/21 225 lb (102.1 kg)   09/27/21 223 lb (101.2 kg)       HPI:   Hypertension  Follow-up  Blood pressure is controlled today. BP Readings from Last 3 Encounters:   03/07/22 136/78   12/06/21 130/74   09/27/21 134/78   Blood pressure regimen has been pretty steady lately. Lots of changes over time in the past.  Patient continues medications regularly. Compliance is good. Denies CP, sob, abd pain, headaches, vision changes, dizziness, hypotensive symptoms. No side effects from medications noted. Anxiety and depression  Depression and mental health has bene good lately  Working a lot  That helps him sleep better. Vit D supplement has been working on mood. Has been taking B12 and a lot of vitamin D lately. Low T  Feels like the dose could increase, but not feeling poorly. Last testosterone and estrogen measurements were within normal limits. Testosterone was in the 300s. He understands the risks versus benefits of treatment. Need some updated lab work at this point. When his hormone levels have fluctuated over time he generally feels symptoms. Has been fairly steady lately. Testosterone injections have caused a peak and trough phenomenon for him with significant side effects after injections. AndroGel worked for a while but then he stopped absorbing the medication. This happened once historically in [place] and more recently appear. Androderm seems to have been working well lately. Insomnia  Patient continues Klonopin 1 mg nightly. His mood has been pretty good lately.   He does not wish to lower the dose. This has been stable for quite some time now. We have historically lowered the dose down to 0.5 mg nightly, never quite able to make it to 1/4 mg nightly. Previous deterioration in mental health and anxiety with resultant insomnia led to the increase back to 1 mg nightly. Not causing excessive sedation in the morning. OARRS report reviewed and consistent. Has been sleeping pretty well with increased work during the day making him more tired.  _________________________________________________________    Assessment / Ignaciorudy Bernsteinata was seen today for hypertension. Diagnoses and all orders for this visit:    Anxiety  Pretty stable recently. Continue nightly Klonopin to help with insomnia. Has taken this long-term. OARRS reviewed and consistent. No tolerance, dependence, misuse behaviors observed. Moderate episode of recurrent major depressive disorder (HCC)  -     buPROPion (WELLBUTRIN SR) 150 MG extended release tablet; Take 1 tablet by mouth 2 times daily  Mood has been good lately. Continue Wellbutrin and Lamictal unchanged. Primary hypertension  Blood pressure has been stable over his last 3 visits. Continue current medication regimen. Hypogonadotropic hypogonadism (HCC)  -     PSA Screening; Future  -     Lipid Panel; Future  -     CBC with Auto Differential; Future  -     Comprehensive Metabolic Panel; Future  -     Testosterone; Future  -     ESTROGENS, FRACTIONATED; Future  Update labs. I would like to continue Androderm 6 mg daily. He would be open to increasing the dose but I would like to see what his labs are first.    Screening for malignant neoplasm of prostate  -     PSA Screening; Future    Return in about 3 months (around 6/7/2022). or as scheduled. Patient counseled to follow up sooner or seek more acute care if symptoms worsening or not improving according to plan.      Electronically signed by Iza Young MD on 3/7/2022    _________________________________________________________  Current Outpatient Medications on File Prior to Visit   Medication Sig Dispense Refill    vitamin D (ERGOCALCIFEROL) 1.25 MG (43364 UT) CAPS capsule Take 1 capsule by mouth once a week 12 capsule 1    clonazePAM (KLONOPIN) 1 MG tablet Take 1 tablet by mouth nightly as needed (sleep) for up to 90 days. 30 tablet 2    atorvastatin (LIPITOR) 40 MG tablet Take 1 tablet by mouth nightly 90 tablet 1    Testosterone (ANDRODERM) 4 MG/24HR PT24 Place 1 patch onto the skin daily for 90 days. Every 24 hours. Remove previous days patch. Rotate sites. Total 6mg daily. 90 patch 0    Testosterone (ANDRODERM) 2 MG/24HR PT24 Place 2 mg onto the skin daily for 90 days. Total 6mg daily. 90 patch 0    dilTIAZem (CARDIZEM CD) 180 MG extended release capsule Take 1 capsule by mouth 2 times daily For blood pressure 180 capsule 1    lamoTRIgine (LAMICTAL) 150 MG tablet Take 1 tablet by mouth daily 90 tablet 1    hydrALAZINE (APRESOLINE) 50 MG tablet Take 1 tablet by mouth 3 times daily 270 tablet 1    Blood Pressure Monitoring (OMRON 5 SERIES BP MONITOR) SHAILESH       Multiple Vitamins-Minerals (THERAPEUTIC MULTIVITAMIN-MINERALS) tablet Take 1 tablet by mouth daily LD 1-18-20      aspirin 81 MG EC tablet Take 1 tablet by mouth daily 30 tablet 3     No current facility-administered medications on file prior to visit.        Patient Active Problem List   Diagnosis Code    Moderate episode of recurrent major depressive disorder (HCC) F33.1    Primary insomnia F51.01    Anxiety F41.9    Hypogonadotropic hypogonadism (HCC) E23.0    Hypertriglyceridemia E78.1    HTN (hypertension) I10    ZIA (obstructive sleep apnea) G47.33    Overweight E66.3    Insomnia G47.00    TIA (transient ischemic attack) G45.9    Hypotestosteronism E34.9     _________________________________________________________  Past Medical History:   Diagnosis Date    Anxiety     Depression     Diarrhea     for OR 20     HTN (hypertension)     Hypertriglyceridemia     Hypogonadotropic hypogonadism (HCC)     Insomnia     ZIA (obstructive sleep apnea)     no CPAP     Overweight        Family History   Problem Relation Age of Onset    No Known Problems Mother     Alcohol Abuse Father     Other Father          meningitis, low testosterone    Depression Daughter     ADHD Daughter        Past Surgical History:   Procedure Laterality Date    COLONOSCOPY  2012    tubular adenoma and tubulovillous adenoma.  COLONOSCOPY N/A 2020    COLORECTAL CANCER SCREENING, NOT HIGH RISK performed by [facility] at [facility]      FOOT SURGERY      left     KNEE ARTHROSCOPY      left     TONSILLECTOMY  1972       Social History     Tobacco Use    Smoking status: Former Smoker     Packs/day: 1.00     Years: 8.00     Pack years: 8.00     Types: Cigarettes     Quit date:      Years since quittin.2    Smokeless tobacco: Never Used   Vaping Use    Vaping Use: Never used   Substance Use Topics    Alcohol use: Yes     Comment: occasional     Drug use: No       Chart reviewed and updated where appropriate for PMH, Fam, and Soc Hx.  _________________________________________________________  ROS: POSITIVE: As in the HPI. Otherwise Pertinent negatives are negative.    __________________________________________________________  Physical Exam   Constitutional:    He is oriented to person, place, and time. He appears well-developed and well-nourished. Eyes:    Conjunctivae are normal.    Pupils are equal, round, and reactive to light. EOMI. Neck:    Normal range of motion. No thyromegaly or nodules noted. No bruit. No LAD. Cardiovascular:    Normal rate, regular rhythm and normal heart sounds. No murmur. No gallop and no friction rub.    Pulmonary/Chest:    Effort normal and breath sounds normal.    No wheezes. No rales or rhonchi. Abdominal:    Soft. Bowel sounds are normal.    No distension. No tenderness. Musculoskeletal:    Normal range of motion. No joint swelling noted. No peripheral edema. Skin:    Skin is warm and dry. No rashes, lesions. Psychiatric:    He has a normal mood and affect. Normal groom and dress. No SI or HI.   ________________________________________________________    This note may have been created using dictation software.  Efforts were made to reduce errors, but some may persist.

## 2022-03-15 ENCOUNTER — PATIENT MESSAGE (OUTPATIENT)
Dept: FAMILY MEDICINE CLINIC | Age: 57
End: 2022-03-15

## 2022-03-15 DIAGNOSIS — E23.0 HYPOGONADOTROPIC HYPOGONADISM (HCC): Primary | ICD-10-CM

## 2022-03-15 NOTE — TELEPHONE ENCOUNTER
From: Samy Sarabia  Sent: 3/15/2022 2:22 PM EDT  To: Horace Simons  Clinical Pool  Subject: T levels    I was checking into my insurance on coverages. I found out that the pill form of testosterone, Roddie Kathie, is covered under my insurance. Also cheaper than the patch. With the problems I've had with absorption, is this something that we could consider?

## 2022-03-17 LAB
ESTRADIOL LEVEL: 17.8 PG/ML (ref 10–42)
ESTROGEN TOTAL: 37.1 PG/ML (ref 19–69)
ESTRONE: 19.3 PG/ML (ref 9–36)

## 2022-03-18 DIAGNOSIS — E23.0 HYPOGONADOTROPIC HYPOGONADISM (HCC): ICD-10-CM

## 2022-03-18 NOTE — TELEPHONE ENCOUNTER
Last Appointment:  3/7/2022  Future Appointments   Date Time Provider Rashel Fransisca   6/9/2022  9:00 AM MD ED [facility]      Pt requesting new testosterone medication be sent to [facility] as 90 day supply rather than local pharm. Pended.     (Prior Auth started, awaiting insurance response.)

## 2022-03-28 DIAGNOSIS — F51.01 PRIMARY INSOMNIA: ICD-10-CM

## 2022-03-29 RX ORDER — CLONAZEPAM 1 MG/1
1 TABLET ORAL NIGHTLY PRN
Qty: 30 TABLET | Refills: 2 | Status: SHIPPED
Start: 2022-03-29 | End: 2022-06-27 | Stop reason: SDUPTHER

## 2022-03-29 NOTE — TELEPHONE ENCOUNTER
Last Appointment:  3/7/2022  Future Appointments   Date Time Provider Rashel Gongora   6/9/2022  9:00 AM Vicente Garvin  [address]

## 2022-04-05 ENCOUNTER — PATIENT MESSAGE (OUTPATIENT)
Dept: FAMILY MEDICINE CLINIC | Age: 57
End: 2022-04-05

## 2022-04-05 DIAGNOSIS — I10 ESSENTIAL HYPERTENSION: ICD-10-CM

## 2022-04-05 RX ORDER — HYDRALAZINE HYDROCHLORIDE 50 MG/1
75 TABLET, FILM COATED ORAL 3 TIMES DAILY
Qty: 270 TABLET | Refills: 1
Start: 2022-04-05 | End: 2022-04-12 | Stop reason: SDUPTHER

## 2022-04-05 RX ORDER — DILTIAZEM HYDROCHLORIDE 180 MG/1
180 CAPSULE, COATED, EXTENDED RELEASE ORAL 2 TIMES DAILY
Qty: 180 CAPSULE | Refills: 1 | Status: SHIPPED
Start: 2022-04-05 | End: 2022-06-27 | Stop reason: SDUPTHER

## 2022-04-05 NOTE — TELEPHONE ENCOUNTER
From: Chilo Smith  To: Dr. Talya Gonzalez: 4/5/2022 9:17 AM EDT  Subject: BP    Hello doc,  The testosterone pills seem to be doing ok. At about the two week point now. It noted that those on BP meds may need an increase after starting , as it may increase blood pressures. That seems to be where I'm at now. Seeing some 160 to 175 readings. I know it's a little early after starting, however it is time for my 90 day refill on diltiazam anyways. Figured it best to let you know now. Please advise.

## 2022-04-12 RX ORDER — HYDRALAZINE HYDROCHLORIDE 50 MG/1
75 TABLET, FILM COATED ORAL 3 TIMES DAILY
Qty: 405 TABLET | Refills: 1 | Status: SHIPPED
Start: 2022-04-12 | End: 2022-06-03

## 2022-05-03 ENCOUNTER — PATIENT MESSAGE (OUTPATIENT)
Dept: FAMILY MEDICINE CLINIC | Age: 57
End: 2022-05-03

## 2022-05-03 DIAGNOSIS — E23.0 HYPOGONADOTROPIC HYPOGONADISM (HCC): Primary | ICD-10-CM

## 2022-05-03 RX ORDER — ATORVASTATIN CALCIUM 40 MG/1
40 TABLET, FILM COATED ORAL NIGHTLY
Qty: 90 TABLET | Refills: 1 | Status: SHIPPED
Start: 2022-05-03 | End: 2022-10-24 | Stop reason: SDUPTHER

## 2022-05-03 NOTE — TELEPHONE ENCOUNTER
From: Chilo Smith  To: Dr. Talya Gonzalez: 5/3/2022 8:08 AM EDT  Subject: T levels    Yudelka Fisher,    Appointment in a few weeks. I was to remind you about bloodwork for Testosterone and Estrogen levels,since starting Bryant Evans. Thanks.

## 2022-05-03 NOTE — TELEPHONE ENCOUNTER
Last Appointment:  3/7/2022  Future Appointments   Date Time Provider Rashel Gongora   6/9/2022  9:00 AM Emmanuel Moore  [address]

## 2022-05-05 DIAGNOSIS — E23.0 HYPOGONADOTROPIC HYPOGONADISM (HCC): ICD-10-CM

## 2022-05-08 LAB
SEX HORMONE BINDING GLOBULIN: 12 NMOL/L (ref 11–80)
TESTOSTERONE FREE-NONMALE: 115.6 PG/ML (ref 47–244)
TESTOSTERONE TOTAL: 370 NG/DL (ref 220–1000)

## 2022-05-11 NOTE — TELEPHONE ENCOUNTER
Last Appointment:  3/7/2022  Future Appointments   Date Time Provider Rashel Gongora   6/3/2022  9:00 AM Jorden Runner,  [address] [place] [address]

## 2022-05-12 RX ORDER — LAMOTRIGINE 150 MG/1
150 TABLET ORAL DAILY
Qty: 90 TABLET | Refills: 1 | Status: SHIPPED
Start: 2022-05-12 | End: 2022-09-12 | Stop reason: SDUPTHER

## 2022-05-17 LAB
ESTRADIOL LEVEL: 13.2 PG/ML (ref 10–42)
ESTROGEN TOTAL: 49.3 PG/ML (ref 19–69)
ESTRONE: 36.1 PG/ML (ref 9–36)

## 2022-06-03 ENCOUNTER — OFFICE VISIT (OUTPATIENT)
Dept: FAMILY MEDICINE CLINIC | Age: 57
End: 2022-06-03
Payer: COMMERCIAL

## 2022-06-03 VITALS
SYSTOLIC BLOOD PRESSURE: 142 MMHG | OXYGEN SATURATION: 96 % | DIASTOLIC BLOOD PRESSURE: 80 MMHG | WEIGHT: 228.2 LBS | HEIGHT: 72 IN | BODY MASS INDEX: 30.91 KG/M2 | TEMPERATURE: 97.5 F | HEART RATE: 80 BPM

## 2022-06-03 DIAGNOSIS — E23.0 HYPOGONADOTROPIC HYPOGONADISM (HCC): Primary | ICD-10-CM

## 2022-06-03 DIAGNOSIS — I10 ESSENTIAL HYPERTENSION: ICD-10-CM

## 2022-06-03 PROCEDURE — 99214 OFFICE O/P EST MOD 30 MIN: CPT | Performed by: FAMILY MEDICINE

## 2022-06-03 RX ORDER — HYDRALAZINE HYDROCHLORIDE 50 MG/1
50 TABLET, FILM COATED ORAL 2 TIMES DAILY
Qty: 405 TABLET | Refills: 1
Start: 2022-06-03 | End: 2022-07-01

## 2022-06-03 RX ORDER — CLONIDINE HYDROCHLORIDE 0.1 MG/1
0.1 TABLET ORAL EVERY 12 HOURS
Qty: 60 TABLET | Refills: 0 | Status: SHIPPED
Start: 2022-06-03 | End: 2022-07-01 | Stop reason: SDUPTHER

## 2022-06-03 NOTE — PROGRESS NOTES
[facility]  Office Progress Note - Dr. Dahlia Melendez  6/3/22    CC:   Chief Complaint   Patient presents with    3 Month Follow-Up     follow-up; testosterone levels        BP (!) 142/80   Pulse 80   Temp 97.5 °F (36.4 °C) (Temporal)   Ht 6' (1.829 m)   Wt 228 lb 3.2 oz (103.5 kg)   SpO2 96%   BMI 30.95 kg/m²   Wt Readings from Last 3 Encounters:   06/03/22 228 lb 3.2 oz (103.5 kg)   03/07/22 225 lb (102.1 kg)   12/06/21 225 lb (102.1 kg)       HPI: Low testosterone  Switch to oral T replacement and seems to be going well so far. Results for Polina Corona (MRN [id number]) as of 6/3/2022 09:17   Ref. Range 11/8/2021 10:19 3/7/2022 11:38 5/5/2022 13:51   Estradiol Latest Ref Range: 10.0 - 42.0 pg/mL 17.9 17.8 13.2   Estrone Latest Ref Range: 9.0 - 36.0 pg/mL 15.4 19.3 36.1 (H)   Sex Hormone Binding Latest Ref Range: 11 - 80 nmol/L 18  12   Testosterone Latest Ref Range: 220 - 1,000 ng/dL 374 276.3 370   Testosterone, Free Latest Ref Range: 47 - 244 pg/mL 101.8  115.6   Estrogen Total Latest Ref Range: 19.0 - 69.0 pg/mL 33.3 37.1 49.3       Hypertension  Follow-up  Blood pressure is not initially controlled today. BP Readings from Last 3 Encounters:   06/03/22 (!) 142/80   03/07/22 136/78   12/06/21 130/74   He isnt happy with hydralazine and would like to come off of it if possible. Have tried many other BP meds over time and cardizem seems to be only one that hasnt given him a side effect. Shes doing well on current dose, but its fairly high. Clonidine is about my last option. He has taken before to try to help with sleep nightly. Discussed need for regular compliance with med and he has bene good with that. Patient continues medications regularly. Compliance is good. Denies CP, sob, abd pain, headaches, vision changes, dizziness, hypotensive symptoms.        _________________________________________________________    Assessment / Delia Glass was seen today for 3 month tablet Take 1 tablet by mouth daily 30 tablet 3     No current facility-administered medications on file prior to visit. Patient Active Problem List   Diagnosis Code    Moderate episode of recurrent major depressive disorder (HCC) F33.1    Primary insomnia F51.01    Anxiety F41.9    Hypogonadotropic hypogonadism (HCC) E23.0    Hypertriglyceridemia E78.1    HTN (hypertension) I10    ZIA (obstructive sleep apnea) G47.33    Overweight E66.3    Insomnia G47.00    TIA (transient ischemic attack) G45.9    Hypotestosteronism E34.9     _________________________________________________________  Past Medical History:   Diagnosis Date    Anxiety     Depression     Diarrhea     for OR 20     HTN (hypertension)     Hypertriglyceridemia     Hypogonadotropic hypogonadism (HCC)     Insomnia     ZIA (obstructive sleep apnea)     no CPAP     Overweight        Family History   Problem Relation Age of Onset    No Known Problems Mother     Alcohol Abuse Father     Other Father          meningitis, low testosterone    Depression Daughter     ADHD Daughter        Past Surgical History:   Procedure Laterality Date    COLONOSCOPY  2012    tubular adenoma and tubulovillous adenoma.      COLONOSCOPY N/A 2020    COLORECTAL CANCER SCREENING, NOT HIGH RISK performed by Padmaja Reid MD at [address]      back      FOOT SURGERY      left     KNEE ARTHROSCOPY      left     TONSILLECTOMY  1972       Social History     Tobacco Use    Smoking status: Former Smoker     Packs/day: 1.00     Years: 8.00     Pack years: 8.00     Types: Cigarettes     Quit date:      Years since quittin.4    Smokeless tobacco: Never Used   Vaping Use    Vaping Use: Never used   Substance Use Topics    Alcohol use: Yes     Comment: occasional     Drug use: No       Chart reviewed and updated where appropriate for PMH, Fam, and Soc Hx.  _________________________________________________________  ROS: POSITIVE: As in the HPI. Otherwise Pertinent negatives are negative.    __________________________________________________________  Physical Exam   Constitutional:    He is oriented to person, place, and time. He appears well-developed and well-nourished. Eyes:    Conjunctivae are normal.    Pupils are equal, round, and reactive to light. EOMI. Neck:    Normal range of motion. No thyromegaly or nodules noted. No bruit. No LAD. Cardiovascular:    Normal rate, regular rhythm and normal heart sounds. No murmur. No gallop and no friction rub. Pulmonary/Chest:    Effort normal and breath sounds normal.    No wheezes. No rales or rhonchi. Abdominal:    Soft. Bowel sounds are normal.    No distension. No tenderness. Musculoskeletal:    Normal range of motion. No joint swelling noted. No peripheral edema. Skin:    Skin is warm and dry. No rashes, lesions. SK on left back. Flat, round, uniform brown/tan mole on left calf enlarging but without red flag signs - about size of a nickel or so. Used to be a dime. Psychiatric:    He has a fair mood and affect. Normal groom and dress. No SI or HI.   ________________________________________________________    This note may have been created using dictation software.  Efforts were made to reduce errors, but some may persist.

## 2022-06-27 DIAGNOSIS — F51.01 PRIMARY INSOMNIA: ICD-10-CM

## 2022-06-27 DIAGNOSIS — I10 ESSENTIAL HYPERTENSION: ICD-10-CM

## 2022-06-28 RX ORDER — DILTIAZEM HYDROCHLORIDE 180 MG/1
180 CAPSULE, COATED, EXTENDED RELEASE ORAL 2 TIMES DAILY
Qty: 180 CAPSULE | Refills: 1 | Status: SHIPPED
Start: 2022-06-28 | End: 2022-09-12 | Stop reason: SDUPTHER

## 2022-06-28 RX ORDER — CLONAZEPAM 1 MG/1
1 TABLET ORAL NIGHTLY PRN
Qty: 30 TABLET | Refills: 2 | Status: SHIPPED
Start: 2022-06-28 | End: 2022-09-12 | Stop reason: SDUPTHER

## 2022-06-28 NOTE — TELEPHONE ENCOUNTER
Last Appointment:  6/3/2022  Future Appointments   Date Time Provider Rashel Gonogra   7/1/2022  9:40 AM Jorden Runner,  [address]

## 2022-06-28 NOTE — TELEPHONE ENCOUNTER
Last Appointment:  6/3/2022  Future Appointments   Date Time Provider Rashel Gongora   7/1/2022  9:40 AM Bernardino Santos  [address] [place] [address]

## 2022-07-01 ENCOUNTER — PATIENT MESSAGE (OUTPATIENT)
Dept: FAMILY MEDICINE CLINIC | Age: 57
End: 2022-07-01

## 2022-07-01 ENCOUNTER — OFFICE VISIT (OUTPATIENT)
Dept: FAMILY MEDICINE CLINIC | Age: 57
End: 2022-07-01
Payer: COMMERCIAL

## 2022-07-01 VITALS
HEIGHT: 72 IN | DIASTOLIC BLOOD PRESSURE: 78 MMHG | WEIGHT: 223 LBS | HEART RATE: 69 BPM | TEMPERATURE: 98.5 F | SYSTOLIC BLOOD PRESSURE: 136 MMHG | BODY MASS INDEX: 30.2 KG/M2 | OXYGEN SATURATION: 96 %

## 2022-07-01 DIAGNOSIS — N45.1 EPIDIDYMITIS: Primary | ICD-10-CM

## 2022-07-01 DIAGNOSIS — I10 ESSENTIAL HYPERTENSION: ICD-10-CM

## 2022-07-01 DIAGNOSIS — E23.0 HYPOGONADOTROPIC HYPOGONADISM (HCC): ICD-10-CM

## 2022-07-01 DIAGNOSIS — M77.12 LEFT LATERAL EPICONDYLITIS: ICD-10-CM

## 2022-07-01 PROCEDURE — 99214 OFFICE O/P EST MOD 30 MIN: CPT | Performed by: FAMILY MEDICINE

## 2022-07-01 RX ORDER — CLONIDINE HYDROCHLORIDE 0.1 MG/1
0.1 TABLET ORAL EVERY 12 HOURS
Qty: 60 TABLET | Refills: 3 | Status: SHIPPED
Start: 2022-07-01 | End: 2022-09-19 | Stop reason: SDUPTHER

## 2022-07-01 RX ORDER — LEVOFLOXACIN 500 MG/1
500 TABLET, FILM COATED ORAL DAILY
Qty: 10 TABLET | Refills: 0 | Status: SHIPPED | OUTPATIENT
Start: 2022-07-01 | End: 2022-07-11

## 2022-07-01 NOTE — TELEPHONE ENCOUNTER
From: Jenny Smith  To: Dr. Darell Goltz  Sent: 7/1/2022 1:14 PM EDT  Subject: Connie Nolan doc. I stopped at the pharmacy to pickup my local meds. they said the [facility] got sent to them. It needs to go to [facility] as a 90 day supply. Only way it's affordable.  Thanks

## 2022-07-01 NOTE — PATIENT INSTRUCTIONS
Patient Education        Tennis Elbow: Exercises  Introduction  Here are some examples of exercises for you to try. The exercises may be suggested for a condition or for rehabilitation. Start each exercise slowly. Ease off the exercises if you start to have pain. You will be told when to start these exercises and which ones will work bestfor you. How to do the exercises  Wrist flexor stretch    1. Extend your arm in front of you with your palm up. 2. Bend your wrist, pointing your hand toward the floor. 3. With your other hand, gently bend your wrist farther until you feel a mild to moderate stretch in your forearm. 4. Hold for at least 15 to 30 seconds. Repeat 2 to 4 times. Wrist extensor stretch    1. Repeat steps 1 to 4 of the stretch above but begin with your extended hand palm down. Ball or sock squeeze    1. Hold a tennis ball (or a rolled-up sock) in your hand. 2. Make a fist around the ball (or sock) and squeeze. 3. Hold for about 6 seconds, and then relax for up to 10 seconds. 4. Repeat 8 to 12 times. 5. Switch the ball (or sock) to your other hand and do 8 to 12 times. Wrist deviation    1. Sit so that your arm is supported but your hand hangs off the edge of a flat surface, such as a table. 2. Hold your hand out like you are shaking hands with someone. 3. Move your hand up and down. 4. Repeat this motion 8 to 12 times. 5. Switch arms. 6. Try to do this exercise twice with each hand. Wrist curls    1. Place your forearm on a table with your hand hanging over the edge of the table, palm up. 2. Place a 1- to 2-pound weight in your hand. This may be a dumbbell, a can of food, or a filled water bottle. 3. Slowly raise and lower the weight while keeping your forearm on the table and your palm facing up. 4. Repeat this motion 8 to 12 times. 5. Switch arms, and do steps 1 through 4.  6. Repeat with your hand facing down toward the floor. Switch arms. Biceps curls    1.  Sit leaning forward with your legs slightly spread and your left hand on your left thigh. 2. Place your right elbow on your right thigh, and hold the weight with your forearm horizontal.  3. Slowly curl the weight up and toward your chest.  4. Repeat this motion 8 to 12 times. 5. Switch arms, and do steps 1 through 4. Follow-up care is a key part of your treatment and safety. Be sure to make and go to all appointments, and call your doctor if you are having problems. It's also a good idea to know your test results and keep alist of the medicines you take. Where can you learn more? Go to [email or web address]://[email or web address] org and sign in to your [employer] account. Enter Q623 in the [employer] box to learn more about \"Tennis Elbow: Exercises. \"     If you do not have an account, please click on the \"Sign Up Now\" link. Current as of: March 9, 2022               Content Version: 13.3  © 2006-2022 [employer] Inc[employer]. Care instructions adapted under license by [facility] ([facility]). If you have questions about a medical condition or this instruction, always ask your healthcare professional. [place] [address] any warranty or liability for your use of this information.

## 2022-07-01 NOTE — PROGRESS NOTES
[facility]  Office Progress Note - Dr. Erik Mccormack  7/1/22    CC:   Chief Complaint   Patient presents with    Hypertension     Started Clonidine last month. /78 (Site: Left Upper Arm, Position: Sitting, Cuff Size: Large Adult)   Pulse 69   Temp 98.5 °F (36.9 °C) (Temporal)   Ht 6' (1.829 m)   Wt 223 lb (101.2 kg)   SpO2 96%   BMI 30.24 kg/m²   Wt Readings from Last 3 Encounters:   07/01/22 223 lb (101.2 kg)   06/03/22 228 lb 3.2 oz (103.5 kg)   03/07/22 225 lb (102.1 kg)       HPI:   Hypertension  Follow-up  On clonidine BID and diltiazem. Off hydralazine now. Has been going ok. Blood pressure is controlled today. Seeing similar numbers at home. Good for him. BP Readings from Last 3 Encounters:   07/01/22 136/78   06/03/22 (!) 142/80   03/07/22 136/78     Patient continues medications regularly. Compliance is good. Denies CP, sob, abd pain, headaches, vision changes, dizziness, hypotensive symptoms. No side effects from medications noted. Feels like things have \"changed rapidly\" on the testosterone. Feels tired, unmotivated, cranky, no sex drive. Similar to how he is felt in the past when his testosterone levels are low. He would like to try increasing the dose of his testosterone supplement. He has been having bilateral testicular pain, worse on the left. Reports this feels similar to his historical episode of epididymitis. No fevers or chills. No urinary changes. No penile discharge. No new sexual partners. On exam he has normal-appearing testicles without focal masses or swelling and mildly tender bilaterally to palpation. Also noting some left forearm lateral condyle pain. Sudden onset. Was not really getting anything under pressure at that time. It was severe. No radiation to the hand or up the arm. Lasted about 2 minutes.   Has been tender for the past couple of weeks since.  _________________________________________________________    Assessment / Lavonsachin Tidwell was seen today for hypertension. Diagnoses and all orders for this visit:    Epididymitis  -     levoFLOXacin (LEVAQUIN) 500 MG tablet; Take 1 tablet by mouth daily for 10 days    Essential hypertension  -Continue   cloNIDine (CATAPRES) 0.1 MG tablet; Take 1 tablet by mouth every 12 hours  Continue diltiazem unchanged. Off hydralazine now. Hypogonadotropic hypogonadism (HCC)  -   Increase testosterone Undecanoate 158 MG CAPS; Take 316 mg by mouth in the morning and at bedtime  -6-week check   testosterone, free, total; Future  Patient understands the long-term risks of treatment and wishes to continue. He believes short-term quality of life benefits outweigh long-term potential risks. Left lateral epicondylitis  Exercises given  Odd episode. Return in about 6 weeks (around 8/12/2022). or as scheduled. Patient counseled to follow up sooner or seek more acute care if symptoms worsening or not improving according to plan. Electronically signed by Franny Otero MD on 7/1/2022    _________________________________________________________  Current Outpatient Medications on File Prior to Visit   Medication Sig Dispense Refill    dilTIAZem (CARDIZEM CD) 180 MG extended release capsule Take 1 capsule by mouth 2 times daily For blood pressure 180 capsule 1    clonazePAM (KLONOPIN) 1 MG tablet Take 1 tablet by mouth nightly as needed (sleep) for up to 90 days.  30 tablet 2    lamoTRIgine (LAMICTAL) 150 MG tablet Take 1 tablet by mouth daily 90 tablet 1    atorvastatin (LIPITOR) 40 MG tablet Take 1 tablet by mouth nightly 90 tablet 1    buPROPion (WELLBUTRIN SR) 150 MG extended release tablet Take 1 tablet by mouth 2 times daily 180 tablet 1    vitamin D (ERGOCALCIFEROL) 1.25 MG (28831 UT) CAPS capsule Take 1 capsule by mouth once a week 12 capsule 1    Blood Pressure Monitoring (OMRON 5 SERIES BP Comment: occasional     Drug use: No       Chart reviewed and updated where appropriate for PMH, Fam, and Soc Hx.  _________________________________________________________  ROS: POSITIVE: As in the HPI. Otherwise Pertinent negatives are negative.    __________________________________________________________  Physical Exam   Constitutional:    He is oriented to person, place, and time. He appears well-developed and well-nourished. Eyes:    Conjunctivae are normal.    Pupils are equal, round, and reactive to light. EOMI. Cardiovascular:    Normal rate, regular rhythm and normal heart sounds. No murmur. No gallop and no friction rub. Pulmonary/Chest:    Effort normal and breath sounds normal.    No wheezes. No rales or rhonchi. Abdominal:    Soft. Bowel sounds are normal.    No distension. No tenderness. Musculoskeletal:    Normal range of motion in the left upper extremity. No joint swelling noted. No peripheral edema. Neurological:    He is A&Ox3. Motor and sensation grossly intact. Normal Gait. Strength normal in the left upper extremity  Skin:    Skin is warm and dry. No rashes, lesions.  exam as in the HPI. Psychiatric:    He has a normal mood and affect. Normal groom and dress. No SI or HI.   ________________________________________________________    This note may have been created using dictation software.  Efforts were made to reduce errors, but some may persist.

## 2022-08-01 RX ORDER — ERGOCALCIFEROL 1.25 MG/1
50000 CAPSULE ORAL WEEKLY
Qty: 12 CAPSULE | Refills: 1 | Status: SHIPPED | OUTPATIENT
Start: 2022-08-01

## 2022-08-01 NOTE — TELEPHONE ENCOUNTER
Last Appointment:  7/1/2022  Future Appointments   Date Time Provider Rashel Gongora   9/19/2022  9:00 AM Dwain Castillo  [address] [facility] [address]

## 2022-08-13 DIAGNOSIS — F33.1 MODERATE EPISODE OF RECURRENT MAJOR DEPRESSIVE DISORDER (HCC): ICD-10-CM

## 2022-08-15 NOTE — TELEPHONE ENCOUNTER
Last Appointment:  7/1/2022  Future Appointments   Date Time Provider Rashel Gongora   9/19/2022  9:00 AM Sravani Torres  [address]

## 2022-08-16 RX ORDER — BUPROPION HYDROCHLORIDE 150 MG/1
150 TABLET, EXTENDED RELEASE ORAL 2 TIMES DAILY
Qty: 180 TABLET | Refills: 1 | Status: SHIPPED | OUTPATIENT
Start: 2022-08-16

## 2022-09-12 DIAGNOSIS — F51.01 PRIMARY INSOMNIA: ICD-10-CM

## 2022-09-12 DIAGNOSIS — I10 ESSENTIAL HYPERTENSION: ICD-10-CM

## 2022-09-12 DIAGNOSIS — E23.0 HYPOGONADOTROPIC HYPOGONADISM (HCC): ICD-10-CM

## 2022-09-13 NOTE — TELEPHONE ENCOUNTER
Last Appointment:  7/1/2022  Future Appointments   Date Time Provider Rashel Gongora   9/19/2022  9:00 AM Sae Chan  [address] [place] [address]

## 2022-09-15 RX ORDER — LAMOTRIGINE 150 MG/1
150 TABLET ORAL DAILY
Qty: 90 TABLET | Refills: 1 | Status: SHIPPED | OUTPATIENT
Start: 2022-09-15

## 2022-09-15 RX ORDER — CLONAZEPAM 1 MG/1
1 TABLET ORAL NIGHTLY PRN
Qty: 30 TABLET | Refills: 2 | Status: SHIPPED | OUTPATIENT
Start: 2022-09-21 | End: 2022-12-20

## 2022-09-15 RX ORDER — DILTIAZEM HYDROCHLORIDE 180 MG/1
180 CAPSULE, COATED, EXTENDED RELEASE ORAL 2 TIMES DAILY
Qty: 180 CAPSULE | Refills: 1 | Status: SHIPPED
Start: 2022-09-15 | End: 2022-09-16 | Stop reason: SDUPTHER

## 2022-09-15 NOTE — TELEPHONE ENCOUNTER
Last Appointment:  7/1/2022  Future Appointments   Date Time Provider Rashel Gongora   9/19/2022  9:00 AM Chris Garcia  [address] [place]

## 2022-09-15 NOTE — TELEPHONE ENCOUNTER
Last Appointment:  7/1/2022  Future Appointments   Date Time Provider Rashel Gongora   9/19/2022  9:00 AM Jama Aldridge  [address] [place] [address]

## 2022-09-16 ENCOUNTER — PATIENT MESSAGE (OUTPATIENT)
Dept: FAMILY MEDICINE CLINIC | Age: 57
End: 2022-09-16

## 2022-09-16 DIAGNOSIS — I10 ESSENTIAL HYPERTENSION: ICD-10-CM

## 2022-09-16 RX ORDER — DILTIAZEM HYDROCHLORIDE 180 MG/1
180 CAPSULE, COATED, EXTENDED RELEASE ORAL 2 TIMES DAILY
Qty: 180 CAPSULE | Refills: 1 | Status: SHIPPED | OUTPATIENT
Start: 2022-09-16

## 2022-09-16 NOTE — TELEPHONE ENCOUNTER
From: Alton Smith  To: Dr. Marco Matute  Sent: 9/16/2022 1:32 PM EDT  Subject: Marian Harley    My diltiazam refill got sent to the local pharmacy. It needs to be 90 day supply sent to [facility] order.

## 2022-09-19 ENCOUNTER — OFFICE VISIT (OUTPATIENT)
Dept: FAMILY MEDICINE CLINIC | Age: 57
End: 2022-09-19
Payer: COMMERCIAL

## 2022-09-19 VITALS
WEIGHT: 220 LBS | HEIGHT: 72 IN | DIASTOLIC BLOOD PRESSURE: 80 MMHG | BODY MASS INDEX: 29.8 KG/M2 | OXYGEN SATURATION: 96 % | TEMPERATURE: 97.9 F | HEART RATE: 82 BPM | SYSTOLIC BLOOD PRESSURE: 136 MMHG

## 2022-09-19 DIAGNOSIS — I10 ESSENTIAL HYPERTENSION: ICD-10-CM

## 2022-09-19 DIAGNOSIS — E23.0 HYPOGONADOTROPIC HYPOGONADISM (HCC): Primary | ICD-10-CM

## 2022-09-19 DIAGNOSIS — F51.01 PRIMARY INSOMNIA: ICD-10-CM

## 2022-09-19 DIAGNOSIS — I10 PRIMARY HYPERTENSION: ICD-10-CM

## 2022-09-19 DIAGNOSIS — E23.0 HYPOGONADOTROPIC HYPOGONADISM (HCC): ICD-10-CM

## 2022-09-19 LAB
ALBUMIN SERPL-MCNC: 4.4 G/DL (ref 3.5–5.2)
ALP BLD-CCNC: 85 U/L (ref 40–129)
ALT SERPL-CCNC: 32 U/L (ref 0–40)
ANION GAP SERPL CALCULATED.3IONS-SCNC: 11 MMOL/L (ref 7–16)
AST SERPL-CCNC: 19 U/L (ref 0–39)
BASOPHILS ABSOLUTE: 0.04 E9/L (ref 0–0.2)
BASOPHILS RELATIVE PERCENT: 0.7 % (ref 0–2)
BILIRUB SERPL-MCNC: <0.2 MG/DL (ref 0–1.2)
BUN BLDV-MCNC: 13 MG/DL (ref 6–20)
CALCIUM SERPL-MCNC: 9.2 MG/DL (ref 8.6–10.2)
CHLORIDE BLD-SCNC: 106 MMOL/L (ref 98–107)
CHOLESTEROL, TOTAL: 143 MG/DL (ref 0–199)
CO2: 28 MMOL/L (ref 22–29)
CREAT SERPL-MCNC: 0.9 MG/DL (ref 0.7–1.2)
EOSINOPHILS ABSOLUTE: 0.18 E9/L (ref 0.05–0.5)
EOSINOPHILS RELATIVE PERCENT: 3.1 % (ref 0–6)
FOLATE: >20 NG/ML (ref 4.8–24.2)
GFR AFRICAN AMERICAN: >60
GFR NON-AFRICAN AMERICAN: >60 ML/MIN/1.73
GLUCOSE BLD-MCNC: 128 MG/DL (ref 74–99)
HCT VFR BLD CALC: 44.6 % (ref 37–54)
HDLC SERPL-MCNC: 44 MG/DL
HEMOGLOBIN: 14.5 G/DL (ref 12.5–16.5)
IMMATURE GRANULOCYTES #: 0.07 E9/L
IMMATURE GRANULOCYTES %: 1.2 % (ref 0–5)
LDL CHOLESTEROL CALCULATED: 29 MG/DL (ref 0–99)
LYMPHOCYTES ABSOLUTE: 1.94 E9/L (ref 1.5–4)
LYMPHOCYTES RELATIVE PERCENT: 32.9 % (ref 20–42)
MCH RBC QN AUTO: 30.9 PG (ref 26–35)
MCHC RBC AUTO-ENTMCNC: 32.5 % (ref 32–34.5)
MCV RBC AUTO: 94.9 FL (ref 80–99.9)
MONOCYTES ABSOLUTE: 0.66 E9/L (ref 0.1–0.95)
MONOCYTES RELATIVE PERCENT: 11.2 % (ref 2–12)
NEUTROPHILS ABSOLUTE: 3.01 E9/L (ref 1.8–7.3)
NEUTROPHILS RELATIVE PERCENT: 50.9 % (ref 43–80)
PDW BLD-RTO: 13.9 FL (ref 11.5–15)
PLATELET # BLD: 394 E9/L (ref 130–450)
PMV BLD AUTO: 9.9 FL (ref 7–12)
POTASSIUM SERPL-SCNC: 3.9 MMOL/L (ref 3.5–5)
RBC # BLD: 4.7 E12/L (ref 3.8–5.8)
SODIUM BLD-SCNC: 145 MMOL/L (ref 132–146)
TOTAL PROTEIN: 7.1 G/DL (ref 6.4–8.3)
TRIGL SERPL-MCNC: 348 MG/DL (ref 0–149)
VITAMIN B-12: 1260 PG/ML (ref 211–946)
VLDLC SERPL CALC-MCNC: 70 MG/DL
WBC # BLD: 5.9 E9/L (ref 4.5–11.5)

## 2022-09-19 PROCEDURE — 99214 OFFICE O/P EST MOD 30 MIN: CPT | Performed by: FAMILY MEDICINE

## 2022-09-19 RX ORDER — CLONIDINE HYDROCHLORIDE 0.1 MG/1
0.1 TABLET ORAL EVERY 12 HOURS
Qty: 180 TABLET | Refills: 3 | Status: SHIPPED | OUTPATIENT
Start: 2022-09-19

## 2022-09-19 NOTE — PROGRESS NOTES
[facility]  Office Progress Note - Dr. Guillen Hemp  9/19/22    CC:   Chief Complaint   Patient presents with    Hypertension        /80   Pulse 82   Temp 97.9 °F (36.6 °C) (Temporal)   Ht 6' (1.829 m)   Wt 220 lb (99.8 kg)   SpO2 96%   BMI 29.84 kg/m²   Wt Readings from Last 3 Encounters:   09/19/22 220 lb (99.8 kg)   07/01/22 223 lb (101.2 kg)   06/03/22 228 lb 3.2 oz (103.5 kg)       HPI: Low T  Increased dose at last apppt by one step. Due for labs. Feels like it has somewhat helped with his symptoms. Has been taking some supplement that's supposed to be an estrogen blocker. Not having hot flashes. ESTredux. Sexual function ok, reliant on viagra. Mood ok.   + fatigue. Hypertension  Follow-up  Blood pressure is controlled today. Has been pretty steady actually. BP Readings from Last 3 Encounters:   09/19/22 136/80   07/01/22 136/78   06/03/22 (!) 142/80     Patient continues medications regularly. Compliance is good. Denies CP, sob, abd pain, headaches, vision changes, dizziness, hypotensive symptoms. No side effects from medications noted. Insomnia  Continues Klonopin 1mg nightly. Cant sleep without it and feels jittery and anxious. Hypervigilant tendencies. OARRS reviewed and consistent. Feels benefits outweigh drawbacks (really just dependence at this point). Mood ok overall.   _________________________________________________________    Assessment / Pawel Sunil was seen today for hypertension. Diagnoses and all orders for this visit:    Hypogonadotropic hypogonadism ([address]  -     Vitamin B12 & Folate; Future  -     Testosterone, free, total; Future  -     ESTROGENS, FRACTIONATED; Future  -     CBC with Auto Differential; Future  -     Comprehensive Metabolic Panel; Future  -     Lipid Panel; Future  Continue current dose of testosterone.      Essential hypertension  -     CBC with Auto Differential; Future  -     Comprehensive Metabolic Panel; Future  -     Lipid Panel; Future  -     cloNIDine (CATAPRES) 0.1 MG tablet; Take 1 tablet by mouth in the morning and 1 tablet in the evening. Continue diltiazem. Controlled and at goal.     Primary insomnia  Continues to benefit from nightly klonopin. OARRS consistent. No problems identified. + dependence to fall asleep. Return in about 3 months (around 12/19/2022). or as scheduled. Patient counseled to follow up sooner or seek more acute care if symptoms worsening or not improving according to plan. Electronically signed by Ligia Cha MD on 9/19/2022    _________________________________________________________  Current Outpatient Medications on File Prior to Visit   Medication Sig Dispense Refill    dilTIAZem (CARDIZEM CD) 180 MG extended release capsule Take 1 capsule by mouth 2 times daily For blood pressure 180 capsule 1    [START ON 9/21/2022] clonazePAM (KLONOPIN) 1 MG tablet Take 1 tablet by mouth nightly as needed (sleep) for up to 90 days. 30 tablet 2    lamoTRIgine (LAMICTAL) 150 MG tablet Take 1 tablet by mouth daily 90 tablet 1    Testosterone Undecanoate 158 MG CAPS Take 316 mg by mouth in the morning and at bedtime 360 capsule 1    buPROPion (WELLBUTRIN SR) 150 MG extended release tablet Take 1 tablet by mouth in the morning and 1 tablet before bedtime. 180 tablet 1    vitamin D (ERGOCALCIFEROL) 1.25 MG (81457 UT) CAPS capsule Take 1 capsule by mouth once a week 12 capsule 1    atorvastatin (LIPITOR) 40 MG tablet Take 1 tablet by mouth nightly 90 tablet 1    Blood Pressure Monitoring (OMRON 5 SERIES BP MONITOR) SHAILESH       Multiple Vitamins-Minerals (THERAPEUTIC MULTIVITAMIN-MINERALS) tablet Take 1 tablet by mouth daily LD 1-18-20      aspirin 81 MG EC tablet Take 1 tablet by mouth daily 30 tablet 3     No current facility-administered medications on file prior to visit.        Patient Active Problem List   Diagnosis Code    Moderate episode of recurrent major depressive disorder ([address] F33.1    Primary insomnia F51.01    Anxiety F41.9    Hypogonadotropic hypogonadism (HCC) E23.0    Hypertriglyceridemia E78.1    HTN (hypertension) I10    ZIA (obstructive sleep apnea) G47.33    Overweight E66.3    Insomnia G47.00    TIA (transient ischemic attack) G45.9    Hypotestosteronism E34.9     _________________________________________________________  Past Medical History:   Diagnosis Date    Anxiety     Depression     Diarrhea     for OR 20     HTN (hypertension)     Hypertriglyceridemia     Hypogonadotropic hypogonadism (HCC)     Insomnia     ZIA (obstructive sleep apnea)     no CPAP     Overweight        Family History   Problem Relation Age of Onset    No Known Problems Mother     Alcohol Abuse Father     Other Father          meningitis, low testosterone    Depression Daughter     ADHD Daughter        Past Surgical History:   Procedure Laterality Date    COLONOSCOPY  2012    tubular adenoma and tubulovillous adenoma. COLONOSCOPY N/A 2020    COLORECTAL CANCER SCREENING, NOT HIGH RISK performed by Zully Martines MD at [facility]      back      FOOT SURGERY      left     KNEE ARTHROSCOPY      left     TONSILLECTOMY  1972       Social History     Tobacco Use    Smoking status: Former     Packs/day: 1.00     Years: 8.00     Pack years: 8.00     Types: Cigarettes     Quit date:      Years since quittin.7    Smokeless tobacco: Never   Vaping Use    Vaping Use: Never used   Substance Use Topics    Alcohol use: Yes     Comment: occasional     Drug use: No       Chart reviewed and updated where appropriate for PMH, Fam, and Soc Hx.  _________________________________________________________  ROS: POSITIVE: As in the HPI. Otherwise Pertinent negatives are negative.    __________________________________________________________  Physical Exam   Constitutional:    He is oriented to person, place, and time.     He appears well-developed and well-nourished. Eyes:    Conjunctivae are normal.    Pupils are equal, round, and reactive to light. EOMI. Neck:    Normal range of motion. No thyromegaly or nodules noted. No bruit. No LAD. Cardiovascular:    Normal rate, regular rhythm and normal heart sounds. No murmur. No gallop and no friction rub. Pulmonary/Chest:    Effort normal and breath sounds normal.    No wheezes. No rales or rhonchi. Abdominal:    Soft. Bowel sounds are normal.    No distension. No tenderness. Musculoskeletal:    Normal range of motion. No joint swelling noted. No peripheral edema. Skin:    Skin is warm and dry. No rashes, lesions. Psychiatric:    He has a normal mood and affect. Normal groom and dress. No SI or HI.   ________________________________________________________    This note may have been created using dictation software.  Efforts were made to reduce errors, but some may persist.

## 2022-09-20 LAB
SEX HORMONE BINDING GLOBULIN: 9 NMOL/L (ref 11–80)
TESTOSTERONE FREE-NONMALE: 108.4 PG/ML (ref 47–244)
TESTOSTERONE TOTAL: 325 NG/DL (ref 220–1000)

## 2022-09-27 LAB
ESTRADIOL LEVEL: 3.1 PG/ML (ref 10–42)
ESTROGEN TOTAL: 11.7 PG/ML (ref 19–69)
ESTRONE: 8.6 PG/ML (ref 9–36)

## 2022-10-25 RX ORDER — ATORVASTATIN CALCIUM 40 MG/1
40 TABLET, FILM COATED ORAL NIGHTLY
Qty: 90 TABLET | Refills: 1 | Status: SHIPPED | OUTPATIENT
Start: 2022-10-25

## 2022-12-08 DIAGNOSIS — F51.01 PRIMARY INSOMNIA: ICD-10-CM

## 2022-12-09 RX ORDER — CLONAZEPAM 1 MG/1
1 TABLET ORAL NIGHTLY PRN
Qty: 30 TABLET | Refills: 2 | OUTPATIENT
Start: 2022-12-09 | End: 2023-03-09

## 2022-12-14 DIAGNOSIS — F51.01 PRIMARY INSOMNIA: ICD-10-CM

## 2022-12-15 RX ORDER — CLONAZEPAM 1 MG/1
TABLET ORAL
Qty: 30 TABLET | Refills: 2 | OUTPATIENT
Start: 2022-12-15

## 2022-12-28 ENCOUNTER — OFFICE VISIT (OUTPATIENT)
Dept: FAMILY MEDICINE CLINIC | Age: 57
End: 2022-12-28
Payer: COMMERCIAL

## 2022-12-28 VITALS
HEIGHT: 72 IN | DIASTOLIC BLOOD PRESSURE: 89 MMHG | BODY MASS INDEX: 30.75 KG/M2 | HEART RATE: 81 BPM | SYSTOLIC BLOOD PRESSURE: 126 MMHG | OXYGEN SATURATION: 96 % | TEMPERATURE: 97.8 F | WEIGHT: 227 LBS

## 2022-12-28 DIAGNOSIS — Z23 NEED FOR INFLUENZA VACCINATION: ICD-10-CM

## 2022-12-28 DIAGNOSIS — E23.0 HYPOGONADOTROPIC HYPOGONADISM (HCC): ICD-10-CM

## 2022-12-28 DIAGNOSIS — I10 ESSENTIAL HYPERTENSION: Primary | ICD-10-CM

## 2022-12-28 DIAGNOSIS — E55.9 VITAMIN D DEFICIENCY: ICD-10-CM

## 2022-12-28 PROCEDURE — 3078F DIAST BP <80 MM HG: CPT | Performed by: FAMILY MEDICINE

## 2022-12-28 PROCEDURE — 3074F SYST BP LT 130 MM HG: CPT | Performed by: FAMILY MEDICINE

## 2022-12-28 PROCEDURE — 90471 IMMUNIZATION ADMIN: CPT | Performed by: FAMILY MEDICINE

## 2022-12-28 PROCEDURE — 90674 CCIIV4 VAC NO PRSV 0.5 ML IM: CPT | Performed by: FAMILY MEDICINE

## 2022-12-28 PROCEDURE — 99214 OFFICE O/P EST MOD 30 MIN: CPT | Performed by: FAMILY MEDICINE

## 2022-12-28 NOTE — PROGRESS NOTES
[facility]  Office Progress Note - Dr. Angelito Kat  12/28/22    CC:   Chief Complaint   Patient presents with    Hypertension        /89   Pulse 81   Temp 97.8 °F (36.6 °C) (Temporal)   Ht 6' (1.829 m)   Wt 227 lb (103 kg)   SpO2 96%   BMI 30.79 kg/m²   Wt Readings from Last 3 Encounters:   12/28/22 227 lb (103 kg)   09/19/22 220 lb (99.8 kg)   07/01/22 223 lb (101.2 kg)       HPI: \"alright all in all. \"   Energy ok  Sex drive a little low. Gained a few pounds, more alcohol and junk food over the holidays   Most recent hormone labs reviewed. Hypertension  Follow-up  Blood pressure is borderline controlled today. BP Readings from Last 3 Encounters:   12/28/22 126/89   09/19/22 136/80   07/01/22 136/78   130s-140s/80s-90s  Patient continues medications regularly. Compliance is good. Denies CP, sob, abd pain, headaches, vision changes, dizziness, hypotensive symptoms. No side effects from medications noted. Vitamin d deficieny. Please been taking 50,000 units weekly and 10,000 units daily through the fall. He felt like his levels were low. Says it happens every year.    _________________________________________________________    Assessment / Anabel Babcock was seen today for hypertension. Diagnoses and all orders for this visit:    Essential hypertension  Borderline diastolic but acceptable. Continue current multidrug regimen    Need for influenza vaccination  -     Influenza, FLUCELVAX, (age 10 mo+), IM, Preservative Free, 0.5 mL    Hypogonadotropic hypogonadism (HCC)  -     Testosterone, free, total; Future  -     ESTROGENS, FRACTIONATED; Future  -     CBC with Auto Differential; Future  -     Comprehensive Metabolic Panel; Future  Continue current testosterone replacement therapy. Vitamin D deficiency  -     Vitamin D 25 Hydroxy; Future  Check labs as he is taking a large amount of vitamin D weekly    Mood fair. Stable.     Return in about 4 months (around 4/28/2023). or as scheduled. Patient counseled to follow up sooner or seek more acute care if symptoms worsening or not improving according to plan. Electronically signed by Pola Harris MD on 12/28/2022    _________________________________________________________  Current Outpatient Medications on File Prior to Visit   Medication Sig Dispense Refill    clonazePAM (KLONOPIN) 1 MG tablet Take 1 tablet by mouth nightly as needed (sleep) for up to 90 days. 30 tablet 2    atorvastatin (LIPITOR) 40 MG tablet Take 1 tablet by mouth nightly 90 tablet 1    cloNIDine (CATAPRES) 0.1 MG tablet Take 1 tablet by mouth in the morning and 1 tablet in the evening. 180 tablet 3    dilTIAZem (CARDIZEM CD) 180 MG extended release capsule Take 1 capsule by mouth 2 times daily For blood pressure 180 capsule 1    lamoTRIgine (LAMICTAL) 150 MG tablet Take 1 tablet by mouth daily 90 tablet 1    Testosterone Undecanoate 158 MG CAPS Take 316 mg by mouth in the morning and at bedtime 360 capsule 1    buPROPion (WELLBUTRIN SR) 150 MG extended release tablet Take 1 tablet by mouth in the morning and 1 tablet before bedtime. 180 tablet 1    vitamin D (ERGOCALCIFEROL) 1.25 MG (56511 UT) CAPS capsule Take 1 capsule by mouth once a week 12 capsule 1    Blood Pressure Monitoring (OMRON 5 SERIES BP MONITOR) SHAILESH       Multiple Vitamins-Minerals (THERAPEUTIC MULTIVITAMIN-MINERALS) tablet Take 1 tablet by mouth daily LD 1-18-20      aspirin 81 MG EC tablet Take 1 tablet by mouth daily 30 tablet 3     No current facility-administered medications on file prior to visit.        Patient Active Problem List   Diagnosis Code    Moderate episode of recurrent major depressive disorder ([facility] F33.1    Primary insomnia F51.01    Anxiety F41.9    Hypogonadotropic hypogonadism (HCC) E23.0    Hypertriglyceridemia E78.1    HTN (hypertension) I10    ZIA (obstructive sleep apnea) G47.33    Overweight E66.3    Insomnia G47.00    TIA (transient ischemic attack) G45.9    Hypotestosteronism E34.9     _________________________________________________________  Past Medical History:   Diagnosis Date    Anxiety     Depression     Diarrhea     for OR 20     HTN (hypertension)     Hypertriglyceridemia     Hypogonadotropic hypogonadism (HCC)     Insomnia     ZIA (obstructive sleep apnea)     no CPAP     Overweight        Family History   Problem Relation Age of Onset    No Known Problems Mother     Alcohol Abuse Father     Other Father          meningitis, low testosterone    Depression Daughter     ADHD Daughter        Past Surgical History:   Procedure Laterality Date    COLONOSCOPY  2012    tubular adenoma and tubulovillous adenoma. COLONOSCOPY N/A 2020    COLORECTAL CANCER SCREENING, NOT HIGH RISK performed by J Carlos Zaragoza MD at [facility] 33      back      FOOT SURGERY      left     KNEE ARTHROSCOPY      left     TONSILLECTOMY  1972       Social History     Tobacco Use    Smoking status: Former     Packs/day: 1.00     Years: 8.00     Pack years: 8.00     Types: Cigarettes     Quit date:      Years since quittin.0    Smokeless tobacco: Never   Vaping Use    Vaping Use: Never used   Substance Use Topics    Alcohol use: Yes     Comment: occasional     Drug use: No       Chart reviewed and updated where appropriate for PMH, Fam, and Soc Hx.  _________________________________________________________  ROS: POSITIVE: As in the HPI. Otherwise Pertinent negatives are negative.    __________________________________________________________  Physical Exam   Constitutional:    He is oriented to person, place, and time. He appears well-developed and well-nourished. Eyes:    Conjunctivae are normal.    Pupils are equal, round, and reactive to light. EOMI. Neck:    Normal range of motion. No thyromegaly or nodules noted. No bruit. No LAD.   Cardiovascular:    Normal rate, regular rhythm and normal heart sounds. No murmur. No gallop and no friction rub. Pulmonary/Chest:    Effort normal and breath sounds normal.    No wheezes. No rales or rhonchi. Musculoskeletal:    Normal range of motion. No joint swelling noted. No peripheral edema. Skin:    Skin is warm and dry. No rashes, lesions. Psychiatric:    He has a normal mood and affect. Normal groom and dress. No SI or HI.   ________________________________________________________    This note may have been created using dictation software.  Efforts were made to reduce errors, but some may persist.

## 2022-12-30 DIAGNOSIS — E55.9 VITAMIN D DEFICIENCY: ICD-10-CM

## 2022-12-30 DIAGNOSIS — E23.0 HYPOGONADOTROPIC HYPOGONADISM (HCC): ICD-10-CM

## 2022-12-30 LAB
ALBUMIN SERPL-MCNC: 4.2 G/DL (ref 3.5–5.2)
ALP BLD-CCNC: 77 U/L (ref 40–129)
ALT SERPL-CCNC: 19 U/L (ref 0–40)
ANION GAP SERPL CALCULATED.3IONS-SCNC: 17 MMOL/L (ref 7–16)
AST SERPL-CCNC: 17 U/L (ref 0–39)
BASOPHILS ABSOLUTE: 0.08 E9/L (ref 0–0.2)
BASOPHILS RELATIVE PERCENT: 1.3 % (ref 0–2)
BILIRUB SERPL-MCNC: 0.2 MG/DL (ref 0–1.2)
BUN BLDV-MCNC: 13 MG/DL (ref 6–20)
CALCIUM SERPL-MCNC: 9.5 MG/DL (ref 8.6–10.2)
CHLORIDE BLD-SCNC: 104 MMOL/L (ref 98–107)
CO2: 23 MMOL/L (ref 22–29)
CREAT SERPL-MCNC: 0.8 MG/DL (ref 0.7–1.2)
EOSINOPHILS ABSOLUTE: 0.23 E9/L (ref 0.05–0.5)
EOSINOPHILS RELATIVE PERCENT: 3.7 % (ref 0–6)
GFR SERPL CREATININE-BSD FRML MDRD: >60 ML/MIN/1.73
GLUCOSE BLD-MCNC: 115 MG/DL (ref 74–99)
HCT VFR BLD CALC: 44.8 % (ref 37–54)
HEMOGLOBIN: 14.6 G/DL (ref 12.5–16.5)
IMMATURE GRANULOCYTES #: 0.02 E9/L
IMMATURE GRANULOCYTES %: 0.3 % (ref 0–5)
LYMPHOCYTES ABSOLUTE: 2.08 E9/L (ref 1.5–4)
LYMPHOCYTES RELATIVE PERCENT: 33.4 % (ref 20–42)
MCH RBC QN AUTO: 31 PG (ref 26–35)
MCHC RBC AUTO-ENTMCNC: 32.6 % (ref 32–34.5)
MCV RBC AUTO: 95.1 FL (ref 80–99.9)
MONOCYTES ABSOLUTE: 0.66 E9/L (ref 0.1–0.95)
MONOCYTES RELATIVE PERCENT: 10.6 % (ref 2–12)
NEUTROPHILS ABSOLUTE: 3.15 E9/L (ref 1.8–7.3)
NEUTROPHILS RELATIVE PERCENT: 50.7 % (ref 43–80)
PDW BLD-RTO: 13.8 FL (ref 11.5–15)
PLATELET # BLD: 297 E9/L (ref 130–450)
PMV BLD AUTO: 10 FL (ref 7–12)
POTASSIUM SERPL-SCNC: 4.4 MMOL/L (ref 3.5–5)
RBC # BLD: 4.71 E12/L (ref 3.8–5.8)
SODIUM BLD-SCNC: 144 MMOL/L (ref 132–146)
TOTAL PROTEIN: 7.4 G/DL (ref 6.4–8.3)
VITAMIN D 25-HYDROXY: 73 NG/ML (ref 30–100)
WBC # BLD: 6.2 E9/L (ref 4.5–11.5)

## 2022-12-31 LAB
SEX HORMONE BINDING GLOBULIN: 9 NMOL/L (ref 11–80)
TESTOSTERONE FREE-NONMALE: 196.7 PG/ML (ref 47–244)
TESTOSTERONE TOTAL: 566 NG/DL (ref 220–1000)

## 2023-01-15 LAB
ESTRADIOL LEVEL: 13.5 PG/ML (ref 10–42)
ESTROGEN TOTAL: 34.5 PG/ML (ref 19–69)
ESTRONE: 21 PG/ML (ref 9–36)

## 2023-02-13 DIAGNOSIS — F33.1 MODERATE EPISODE OF RECURRENT MAJOR DEPRESSIVE DISORDER (HCC): ICD-10-CM

## 2023-02-13 NOTE — TELEPHONE ENCOUNTER
Last Appointment:  12/28/2022  Future Appointments   Date Time Provider Rashel Gongora   4/28/2023 10:20 AM Kayy Arriaza  [address]

## 2023-02-14 RX ORDER — ERGOCALCIFEROL 1.25 MG/1
50000 CAPSULE ORAL WEEKLY
Qty: 12 CAPSULE | Refills: 1 | Status: SHIPPED | OUTPATIENT
Start: 2023-02-14

## 2023-02-14 RX ORDER — BUPROPION HYDROCHLORIDE 150 MG/1
150 TABLET, EXTENDED RELEASE ORAL 2 TIMES DAILY
Qty: 180 TABLET | Refills: 1 | Status: SHIPPED | OUTPATIENT
Start: 2023-02-14

## 2023-03-06 DIAGNOSIS — F51.01 PRIMARY INSOMNIA: ICD-10-CM

## 2023-03-07 RX ORDER — CLONAZEPAM 1 MG/1
1 TABLET ORAL NIGHTLY PRN
Qty: 30 TABLET | Refills: 2 | Status: SHIPPED | OUTPATIENT
Start: 2023-03-07 | End: 2023-06-05

## 2023-03-07 NOTE — TELEPHONE ENCOUNTER
Colonoscopy scheduled at [place]. Gatorade-Miralax instructions reviewed and mailed to patient.      Last Appointment:  12/28/2022  Future Appointments   Date Time Provider Department Center   4/28/2023 10:20 AM MD ED Sharma [facility]

## 2023-03-12 DIAGNOSIS — E23.0 HYPOGONADOTROPIC HYPOGONADISM (HCC): ICD-10-CM

## 2023-03-12 DIAGNOSIS — Z12.5 SCREENING FOR MALIGNANT NEOPLASM OF PROSTATE: Primary | ICD-10-CM

## 2023-03-13 NOTE — TELEPHONE ENCOUNTER
Last Appointment:  12/28/2022  Future Appointments   Date Time Provider Rashel Gongora   4/28/2023 10:20 AM Cyril Alvarenga  [address]

## 2023-03-27 DIAGNOSIS — I10 ESSENTIAL HYPERTENSION: ICD-10-CM

## 2023-03-27 RX ORDER — DILTIAZEM HYDROCHLORIDE 180 MG/1
180 CAPSULE, COATED, EXTENDED RELEASE ORAL 2 TIMES DAILY
Qty: 180 CAPSULE | Refills: 1 | Status: SHIPPED | OUTPATIENT
Start: 2023-03-27

## 2023-03-27 NOTE — TELEPHONE ENCOUNTER
Last Appointment:  12/28/2022  Future Appointments   Date Time Provider Rashel Gongora   4/28/2023 10:20 AM [place] MD [address] [place] [address]

## 2023-04-20 DIAGNOSIS — E23.0 HYPOGONADOTROPIC HYPOGONADISM (HCC): ICD-10-CM

## 2023-04-20 DIAGNOSIS — Z12.5 SCREENING FOR MALIGNANT NEOPLASM OF PROSTATE: ICD-10-CM

## 2023-04-20 LAB — PSA SERPL-MCNC: 2.36 NG/ML (ref 0–4)

## 2023-04-22 LAB
SHBG SERPL-SCNC: 9 NMOL/L (ref 11–80)
TESTOST FREE SERPL-MCNC: 103.5 PG/ML (ref 47–244)
TESTOST SERPL-MCNC: 311 NG/DL (ref 220–1000)

## 2023-04-24 LAB
ESTRADIOL SERPL HS-MCNC: 9.1 PG/ML (ref 10–42)
ESTROGEN SERPL CALC-MCNC: 29.7 PG/ML (ref 19–69)
ESTRONE SERPL-MCNC: 20.6 PG/ML (ref 9–36)

## 2023-04-28 ENCOUNTER — OFFICE VISIT (OUTPATIENT)
Dept: FAMILY MEDICINE CLINIC | Age: 58
End: 2023-04-28
Payer: COMMERCIAL

## 2023-04-28 VITALS
OXYGEN SATURATION: 96 % | TEMPERATURE: 98.5 F | DIASTOLIC BLOOD PRESSURE: 80 MMHG | SYSTOLIC BLOOD PRESSURE: 130 MMHG | BODY MASS INDEX: 29.8 KG/M2 | HEART RATE: 78 BPM | HEIGHT: 72 IN | WEIGHT: 220 LBS

## 2023-04-28 DIAGNOSIS — I10 PRIMARY HYPERTENSION: ICD-10-CM

## 2023-04-28 DIAGNOSIS — R97.20 INCREASED PROSTATE SPECIFIC ANTIGEN (PSA) VELOCITY: ICD-10-CM

## 2023-04-28 DIAGNOSIS — E23.0 HYPOGONADOTROPIC HYPOGONADISM (HCC): Primary | ICD-10-CM

## 2023-04-28 DIAGNOSIS — F33.1 MODERATE EPISODE OF RECURRENT MAJOR DEPRESSIVE DISORDER (HCC): ICD-10-CM

## 2023-04-28 PROCEDURE — 3075F SYST BP GE 130 - 139MM HG: CPT | Performed by: FAMILY MEDICINE

## 2023-04-28 PROCEDURE — 99214 OFFICE O/P EST MOD 30 MIN: CPT | Performed by: FAMILY MEDICINE

## 2023-04-28 PROCEDURE — 3079F DIAST BP 80-89 MM HG: CPT | Performed by: FAMILY MEDICINE

## 2023-04-28 RX ORDER — ATORVASTATIN CALCIUM 40 MG/1
40 TABLET, FILM COATED ORAL NIGHTLY
Qty: 90 TABLET | Refills: 1 | Status: SHIPPED | OUTPATIENT
Start: 2023-04-28

## 2023-04-28 RX ORDER — LAMOTRIGINE 150 MG/1
150 TABLET ORAL DAILY
Qty: 90 TABLET | Refills: 1 | Status: SHIPPED | OUTPATIENT
Start: 2023-04-28

## 2023-04-28 ASSESSMENT — PATIENT HEALTH QUESTIONNAIRE - PHQ9
3. TROUBLE FALLING OR STAYING ASLEEP: 3
SUM OF ALL RESPONSES TO PHQ QUESTIONS 1-9: 6
2. FEELING DOWN, DEPRESSED OR HOPELESS: 0
4. FEELING TIRED OR HAVING LITTLE ENERGY: 3
10. IF YOU CHECKED OFF ANY PROBLEMS, HOW DIFFICULT HAVE THESE PROBLEMS MADE IT FOR YOU TO DO YOUR WORK, TAKE CARE OF THINGS AT HOME, OR GET ALONG WITH OTHER PEOPLE: 1
7. TROUBLE CONCENTRATING ON THINGS, SUCH AS READING THE NEWSPAPER OR WATCHING TELEVISION: 0
SUM OF ALL RESPONSES TO PHQ QUESTIONS 1-9: 6
8. MOVING OR SPEAKING SO SLOWLY THAT OTHER PEOPLE COULD HAVE NOTICED. OR THE OPPOSITE, BEING SO FIGETY OR RESTLESS THAT YOU HAVE BEEN MOVING AROUND A LOT MORE THAN USUAL: 0
SUM OF ALL RESPONSES TO PHQ QUESTIONS 1-9: 6
6. FEELING BAD ABOUT YOURSELF - OR THAT YOU ARE A FAILURE OR HAVE LET YOURSELF OR YOUR FAMILY DOWN: 0
9. THOUGHTS THAT YOU WOULD BE BETTER OFF DEAD, OR OF HURTING YOURSELF: 0
SUM OF ALL RESPONSES TO PHQ QUESTIONS 1-9: 6
5. POOR APPETITE OR OVEREATING: 0
1. LITTLE INTEREST OR PLEASURE IN DOING THINGS: 0
SUM OF ALL RESPONSES TO PHQ9 QUESTIONS 1 & 2: 0

## 2023-04-28 NOTE — PROGRESS NOTES
Future  Testosterone levels are normal range a little bit lower at 311. His estrogen he is between 21 and 30 which he finds tolerable. Has been taking online Arimidex pills and a small amount once a week or so to keep this under control. He is due this historically and treated [place]. I am not condoning the prescription, but he feels best when he does this and there is some physiologic sense here. His PSA jumped up a little bit. We will repeat that in about 4 months. Increased prostate specific antigen (PSA) velocity  -     PSA, Diagnostic; Future  Recheck in 4 months given the testosterone replacement therapy and the sudden jump. Moderate episode of recurrent major depressive disorder ([address]  Good recently on current regimen. Continue same. Primary hypertension  Blood pressure well controlled today and has been stable. Continue current regimen for stability. Other orders  -     lamoTRIgine (LAMICTAL) 150 MG tablet; Take 1 tablet by mouth daily  -     atorvastatin (LIPITOR) 40 MG tablet; Take 1 tablet by mouth nightly      Return in about 4 months (around 8/28/2023). or as scheduled. Patient counseled to follow up sooner or seek more acute care if symptoms worsening or not improving according to plan. Electronically signed by Shy Mays MD on 4/28/2023    _________________________________________________________  Current Outpatient Medications on File Prior to Visit   Medication Sig Dispense Refill    dilTIAZem (CARDIZEM CD) 180 MG extended release capsule Take 1 capsule by mouth 2 times daily For blood pressure 180 capsule 1    Testosterone Undecanoate 158 MG CAPS Take 316 mg by mouth in the morning and at bedtime 360 capsule 1    clonazePAM (KLONOPIN) 1 MG tablet Take 1 tablet by mouth nightly as needed (sleep) for up to 90 days.  30 tablet 2    vitamin D (ERGOCALCIFEROL) 1.25 MG (64417 UT) CAPS capsule Take 1 capsule by mouth once a week 12 capsule 1    buPROPion (WELLBUTRIN SR)

## 2023-06-04 DIAGNOSIS — F51.01 PRIMARY INSOMNIA: ICD-10-CM

## 2023-06-05 RX ORDER — CLONAZEPAM 1 MG/1
1 TABLET ORAL NIGHTLY PRN
Qty: 30 TABLET | Refills: 2 | Status: SHIPPED | OUTPATIENT
Start: 2023-06-05 | End: 2023-09-03

## 2023-06-05 NOTE — TELEPHONE ENCOUNTER
Last Appointment:  4/28/2023  Future Appointments   Date Time Provider Rashel Gongora   8/28/2023 10:20 AM Kale Paul  [address]

## 2023-06-26 DIAGNOSIS — I10 ESSENTIAL HYPERTENSION: ICD-10-CM

## 2023-06-26 RX ORDER — DILTIAZEM HYDROCHLORIDE 180 MG/1
180 CAPSULE, COATED, EXTENDED RELEASE ORAL 2 TIMES DAILY
Qty: 180 CAPSULE | Refills: 1 | Status: SHIPPED | OUTPATIENT
Start: 2023-06-26

## 2023-07-26 RX ORDER — LAMOTRIGINE 150 MG/1
150 TABLET ORAL DAILY
Qty: 90 TABLET | Refills: 1 | Status: SHIPPED | OUTPATIENT
Start: 2023-07-26

## 2023-07-26 RX ORDER — ATORVASTATIN CALCIUM 40 MG/1
40 TABLET, FILM COATED ORAL NIGHTLY
Qty: 90 TABLET | Refills: 1 | Status: SHIPPED | OUTPATIENT
Start: 2023-07-26

## 2023-07-26 NOTE — TELEPHONE ENCOUNTER
Patients last appointment 4/28/2023.   Patients next scheduled appointment   Future Appointments   Date Time Provider [address]   8/28/2023 10:20 AM Juan Lott MD [facility]     refill

## 2023-08-09 DIAGNOSIS — E23.0 HYPOGONADOTROPIC HYPOGONADISM (HCC): ICD-10-CM

## 2023-08-09 DIAGNOSIS — F33.1 MODERATE EPISODE OF RECURRENT MAJOR DEPRESSIVE DISORDER (HCC): ICD-10-CM

## 2023-08-09 DIAGNOSIS — I10 ESSENTIAL HYPERTENSION: ICD-10-CM

## 2023-08-09 RX ORDER — CLONIDINE HYDROCHLORIDE 0.1 MG/1
0.1 TABLET ORAL EVERY 12 HOURS
Qty: 180 TABLET | Refills: 1 | Status: SHIPPED | OUTPATIENT
Start: 2023-08-09

## 2023-08-09 RX ORDER — BUPROPION HYDROCHLORIDE 150 MG/1
150 TABLET, EXTENDED RELEASE ORAL 2 TIMES DAILY
Qty: 180 TABLET | Refills: 1 | Status: SHIPPED | OUTPATIENT
Start: 2023-08-09

## 2023-08-09 NOTE — TELEPHONE ENCOUNTER
Last Appointment:  4/28/2023  Future Appointments   Date Time Provider [address]   8/28/2023 10:20 AM Micah Charles  [facility] [place]

## 2023-08-14 ENCOUNTER — HOSPITAL ENCOUNTER (EMERGENCY)
Dept: HOSPITAL 83 - ED | Age: 58
Discharge: HOME | End: 2023-08-14
Payer: COMMERCIAL

## 2023-08-14 VITALS — HEIGHT: 67.99 IN | BODY MASS INDEX: 27.28 KG/M2 | WEIGHT: 180 LBS

## 2023-08-14 VITALS — DIASTOLIC BLOOD PRESSURE: 100 MMHG | SYSTOLIC BLOOD PRESSURE: 156 MMHG

## 2023-08-14 DIAGNOSIS — F10.129: ICD-10-CM

## 2023-08-14 DIAGNOSIS — Z91.040: ICD-10-CM

## 2023-08-14 DIAGNOSIS — Z79.899: ICD-10-CM

## 2023-08-14 DIAGNOSIS — F43.20: Primary | ICD-10-CM

## 2023-08-14 DIAGNOSIS — F32.A: ICD-10-CM
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
## 2023-08-14 DIAGNOSIS — I10: ICD-10-CM

## 2023-08-14 DIAGNOSIS — Z88.5: ICD-10-CM

## 2023-08-14 DIAGNOSIS — Z98.890: ICD-10-CM

## 2023-08-14 LAB
ALP SERPL-CCNC: 78 U/L (ref 46–116)
ALT SERPL W P-5'-P-CCNC: 37 U/L (ref 10–49)
BACTERIA #/AREA URNS HPF: (no result) /[HPF]
BASOPHILS # BLD AUTO: 0.1 10*3/UL (ref 0–0.1)
BASOPHILS NFR BLD AUTO: 0.4 % (ref 0–1)
BUN SERPL-MCNC: 10 MG/DL (ref 9–23)
CASTS URNS QL MICRO: (no result)
CHLORIDE SERPL-SCNC: 106 MMOL/L (ref 98–107)
EOSINOPHIL # BLD AUTO: 0 10*3/UL (ref 0–0.4)
EOSINOPHIL # BLD AUTO: 0.2 % (ref 1–4)
EPI CELLS #/AREA URNS HPF: (no result) /[HPF]
ERYTHROCYTE [DISTWIDTH] IN BLOOD BY AUTOMATED COUNT: 14.6 % (ref 0–14.5)
ETHANOL SERPL-MCNC: 134.8 MG/DL (ref ?–3)
HCT VFR BLD AUTO: 44.4 % (ref 42–52)
KETONES UR QL STRIP: (no result)
LYMPHOCYTES # BLD AUTO: 1.2 10*3/UL (ref 1.3–4.4)
LYMPHOCYTES NFR BLD AUTO: 9.3 % (ref 27–41)
MCH RBC QN AUTO: 31.1 PG (ref 27–31)
MCHC RBC AUTO-ENTMCNC: 32.9 G/DL (ref 33–37)
MCV RBC AUTO: 94.5 FL (ref 80–94)
MONOCYTES # BLD AUTO: 0.7 10*3/UL (ref 0.1–1)
MONOCYTES NFR BLD MANUAL: 5.3 % (ref 3–9)
MUCOUS THREADS URNS QL MICRO: (no result)
NEUT #: 10.6 10*3/UL (ref 2.3–7.9)
NEUT %: 84.4 % (ref 47–73)
NRBC BLD QL AUTO: 0 % (ref 0–0)
PH UR STRIP: 5.5 [PH] (ref 4.5–8)
PLATELET # BLD AUTO: 289 10*3/UL (ref 130–400)
PMV BLD AUTO: 9.5 FL (ref 9.6–12.3)
POTASSIUM SERPL-SCNC: 3.8 MMOL/L (ref 3.4–5.1)
PROT SERPL-MCNC: 7.5 GM/DL (ref 6–8)
RBC # BLD AUTO: 4.7 10*6/UL (ref 4.5–5.9)
RBC #/AREA URNS HPF: (no result) RBC/HPF (ref 0–2)
SP GR UR: 1.02 (ref 1–1.03)
UROBILINOGEN UR STRIP-MCNC: 1 E.U./DL (ref 0–1)
WBC #/AREA URNS HPF: (no result) WBC/HPF (ref 0–5)
WBC NRBC COR # BLD AUTO: 12.6 10*3/UL (ref 4.8–10.8)

## 2023-08-15 DIAGNOSIS — E23.0 HYPOGONADOTROPIC HYPOGONADISM (HCC): ICD-10-CM

## 2023-08-15 DIAGNOSIS — R97.20 INCREASED PROSTATE SPECIFIC ANTIGEN (PSA) VELOCITY: ICD-10-CM

## 2023-08-15 LAB
ABSOLUTE IMMATURE GRANULOCYTE: <0.03 K/UL (ref 0–0.58)
ALBUMIN SERPL-MCNC: 4.1 G/DL (ref 3.5–5.2)
ALP BLD-CCNC: 68 U/L (ref 40–129)
ALT SERPL-CCNC: 29 U/L (ref 0–40)
ANION GAP SERPL CALCULATED.3IONS-SCNC: 12 MMOL/L (ref 7–16)
AST SERPL-CCNC: 35 U/L (ref 0–39)
BASOPHILS ABSOLUTE: 0.05 K/UL (ref 0–0.2)
BASOPHILS RELATIVE PERCENT: 1 % (ref 0–2)
BILIRUB SERPL-MCNC: 0.3 MG/DL (ref 0–1.2)
BUN BLDV-MCNC: 16 MG/DL (ref 6–20)
CALCIUM SERPL-MCNC: 7.9 MG/DL (ref 8.6–10.2)
CHLORIDE BLD-SCNC: 104 MMOL/L (ref 98–107)
CO2: 25 MMOL/L (ref 22–29)
CREAT SERPL-MCNC: 0.8 MG/DL (ref 0.7–1.2)
EOSINOPHILS ABSOLUTE: 0.31 K/UL (ref 0.05–0.5)
EOSINOPHILS RELATIVE PERCENT: 5 % (ref 0–6)
GFR SERPL CREATININE-BSD FRML MDRD: >60 ML/MIN/1.73M2
GLUCOSE BLD-MCNC: 130 MG/DL (ref 74–99)
HCT VFR BLD CALC: 42.4 % (ref 37–54)
HEMOGLOBIN: 13.1 G/DL (ref 12.5–16.5)
IMMATURE GRANULOCYTES: 0 % (ref 0–5)
LYMPHOCYTES ABSOLUTE: 1.66 K/UL (ref 1.5–4)
LYMPHOCYTES RELATIVE PERCENT: 26 % (ref 20–42)
MCH RBC QN AUTO: 30.9 PG (ref 26–35)
MCHC RBC AUTO-ENTMCNC: 30.9 G/DL (ref 32–34.5)
MCV RBC AUTO: 100 FL (ref 80–99.9)
MONOCYTES ABSOLUTE: 0.7 K/UL (ref 0.1–0.95)
MONOCYTES RELATIVE PERCENT: 11 % (ref 2–12)
NEUTROPHILS ABSOLUTE: 3.69 K/UL (ref 1.8–7.3)
NEUTROPHILS RELATIVE PERCENT: 57 % (ref 43–80)
PDW BLD-RTO: 14.6 % (ref 11.5–15)
PLATELET # BLD: 252 K/UL (ref 130–450)
PMV BLD AUTO: 9.9 FL (ref 7–12)
POTASSIUM SERPL-SCNC: 3.5 MMOL/L (ref 3.5–5)
PROSTATE SPECIFIC ANTIGEN: 0.82 NG/ML (ref 0–4)
RBC # BLD: 4.24 M/UL (ref 3.8–5.8)
SODIUM BLD-SCNC: 141 MMOL/L (ref 132–146)
TOTAL PROTEIN: 6.7 G/DL (ref 6.4–8.3)
WBC # BLD: 6.4 K/UL (ref 4.5–11.5)

## 2023-08-16 LAB — TESTOSTERONE TOTAL: 361 NG/DL (ref 193–740)

## 2023-08-18 ENCOUNTER — OFFICE VISIT (OUTPATIENT)
Dept: FAMILY MEDICINE CLINIC | Age: 58
End: 2023-08-18
Payer: COMMERCIAL

## 2023-08-18 VITALS
DIASTOLIC BLOOD PRESSURE: 80 MMHG | RESPIRATION RATE: 18 BRPM | WEIGHT: 220 LBS | HEIGHT: 72 IN | HEART RATE: 84 BPM | OXYGEN SATURATION: 97 % | SYSTOLIC BLOOD PRESSURE: 126 MMHG | BODY MASS INDEX: 29.8 KG/M2 | TEMPERATURE: 97.9 F

## 2023-08-18 DIAGNOSIS — F33.1 MODERATE EPISODE OF RECURRENT MAJOR DEPRESSIVE DISORDER (HCC): ICD-10-CM

## 2023-08-18 DIAGNOSIS — F43.25 ADJUSTMENT DISORDER WITH MIXED DISTURBANCE OF EMOTIONS AND CONDUCT: Primary | ICD-10-CM

## 2023-08-18 DIAGNOSIS — F51.01 PRIMARY INSOMNIA: ICD-10-CM

## 2023-08-18 PROCEDURE — 3079F DIAST BP 80-89 MM HG: CPT | Performed by: FAMILY MEDICINE

## 2023-08-18 PROCEDURE — 99214 OFFICE O/P EST MOD 30 MIN: CPT | Performed by: FAMILY MEDICINE

## 2023-08-18 PROCEDURE — 3074F SYST BP LT 130 MM HG: CPT | Performed by: FAMILY MEDICINE

## 2023-08-18 RX ORDER — LAMOTRIGINE 200 MG/1
200 TABLET ORAL DAILY
Qty: 30 TABLET | Refills: 0 | Status: SHIPPED | OUTPATIENT
Start: 2023-08-18

## 2023-08-18 SDOH — ECONOMIC STABILITY: FOOD INSECURITY: WITHIN THE PAST 12 MONTHS, THE FOOD YOU BOUGHT JUST DIDN'T LAST AND YOU DIDN'T HAVE MONEY TO GET MORE.: NEVER TRUE

## 2023-08-18 SDOH — ECONOMIC STABILITY: FOOD INSECURITY: WITHIN THE PAST 12 MONTHS, YOU WORRIED THAT YOUR FOOD WOULD RUN OUT BEFORE YOU GOT MONEY TO BUY MORE.: NEVER TRUE

## 2023-08-18 SDOH — ECONOMIC STABILITY: INCOME INSECURITY: HOW HARD IS IT FOR YOU TO PAY FOR THE VERY BASICS LIKE FOOD, HOUSING, MEDICAL CARE, AND HEATING?: NOT HARD AT ALL

## 2023-08-18 SDOH — ECONOMIC STABILITY: HOUSING INSECURITY
IN THE LAST 12 MONTHS, WAS THERE A TIME WHEN YOU DID NOT HAVE A STEADY PLACE TO SLEEP OR SLEPT IN A SHELTER (INCLUDING NOW)?: NO

## 2023-08-19 LAB
ESTRADIOL LEVEL: 42.4 PG/ML (ref 10–42)
ESTROGEN TOTAL: 152.3 PG/ML (ref 19–69)
ESTRONE: 109.9 PG/ML (ref 9–36)

## 2023-08-28 ENCOUNTER — OFFICE VISIT (OUTPATIENT)
Dept: FAMILY MEDICINE CLINIC | Age: 58
End: 2023-08-28
Payer: COMMERCIAL

## 2023-08-28 VITALS
HEART RATE: 89 BPM | DIASTOLIC BLOOD PRESSURE: 70 MMHG | HEIGHT: 72 IN | OXYGEN SATURATION: 96 % | RESPIRATION RATE: 16 BRPM | TEMPERATURE: 98.7 F | WEIGHT: 216 LBS | SYSTOLIC BLOOD PRESSURE: 128 MMHG | BODY MASS INDEX: 29.26 KG/M2

## 2023-08-28 DIAGNOSIS — F43.25 ADJUSTMENT DISORDER WITH MIXED DISTURBANCE OF EMOTIONS AND CONDUCT: Primary | ICD-10-CM

## 2023-08-28 DIAGNOSIS — I10 ESSENTIAL HYPERTENSION: ICD-10-CM

## 2023-08-28 DIAGNOSIS — F51.01 PRIMARY INSOMNIA: ICD-10-CM

## 2023-08-28 DIAGNOSIS — F33.1 MODERATE EPISODE OF RECURRENT MAJOR DEPRESSIVE DISORDER (HCC): ICD-10-CM

## 2023-08-28 DIAGNOSIS — L71.9 ROSACEA: ICD-10-CM

## 2023-08-28 DIAGNOSIS — E23.0 HYPOGONADOTROPIC HYPOGONADISM (HCC): ICD-10-CM

## 2023-08-28 PROCEDURE — 99214 OFFICE O/P EST MOD 30 MIN: CPT | Performed by: FAMILY MEDICINE

## 2023-08-28 PROCEDURE — 3074F SYST BP LT 130 MM HG: CPT | Performed by: FAMILY MEDICINE

## 2023-08-28 PROCEDURE — 3078F DIAST BP <80 MM HG: CPT | Performed by: FAMILY MEDICINE

## 2023-08-28 RX ORDER — METRONIDAZOLE 10 MG/G
GEL TOPICAL
Qty: 60 G | Refills: 2 | Status: SHIPPED | OUTPATIENT
Start: 2023-08-28

## 2023-08-28 RX ORDER — ERGOCALCIFEROL 1.25 MG/1
50000 CAPSULE ORAL WEEKLY
Qty: 12 CAPSULE | Refills: 1 | Status: SHIPPED | OUTPATIENT
Start: 2023-08-28

## 2023-08-28 NOTE — PROGRESS NOTES
Yes     Comment: occasional     Drug use: No       Chart reviewed and updated where appropriate for PMH, Fam, and Soc Hx.  _________________________________________________________  ROS: POSITIVE: As in the HPI. Otherwise Pertinent negatives are negative.    __________________________________________________________  Physical Exam   Constitutional:    He is oriented to person, place, and time. He appears well-developed and well-nourished. HENT:    Right Ear: normal pinna, normal canal, normal TM. Left Ear: normal pinna, normal canal, normal TM. Nose: Nose normal.  Dime size lump without discoloration or change in skin texture left of center towards the tip of the nose   Mouth/Throat: Oropharynx is clear and moist.   Eyes:    Conjunctivae are normal.    Pupils are equal, round, and reactive to light. EOMI. Neck:    Normal range of motion. No thyromegaly or nodules noted. No bruit. No LAD. Cardiovascular:    Normal rate, regular rhythm and normal heart sounds. No murmur. No gallop and no friction rub. Pulmonary/Chest:    Effort normal and breath sounds normal.    No wheezes. No rales or rhonchi. Abdominal:    Soft. Bowel sounds are normal.    No distension. No tenderness. Musculoskeletal:    Normal range of motion. No joint swelling noted. No peripheral edema. Skin:    Skin is warm and dry. Warty lesion about the size of a nickel on his left lateral calf. Uniform. No rashes, lesions. Psychiatric:    He has a normal/stable mood and affect. Normal groom and dress. No SI or HI.   ________________________________________________________    This note may have been created using dictation software.  Efforts were made to reduce errors, but some may persist.

## 2023-08-31 DIAGNOSIS — F51.01 PRIMARY INSOMNIA: ICD-10-CM

## 2023-08-31 DIAGNOSIS — F43.25 ADJUSTMENT DISORDER WITH MIXED DISTURBANCE OF EMOTIONS AND CONDUCT: ICD-10-CM

## 2023-08-31 DIAGNOSIS — F33.1 MODERATE EPISODE OF RECURRENT MAJOR DEPRESSIVE DISORDER (HCC): ICD-10-CM

## 2023-08-31 NOTE — TELEPHONE ENCOUNTER
Medication(s) pended?    [x] Yes  [] No    Last Appointment:  8/28/2023    Future appts:  Future Appointments   Date Time Provider [address]   12/1/2023 10:00 AM Mark eFliz  [facility] [employer]

## 2023-08-31 NOTE — TELEPHONE ENCOUNTER
Medication(s) pended?    [x] Yes  [] No    Last Appointment:  8/28/2023    Future appts:  Future Appointments   Date Time Provider [address]   12/1/2023 10:00 AM Norris Nolan  [facility] [employer]

## 2023-09-01 RX ORDER — LAMOTRIGINE 200 MG/1
200 TABLET ORAL DAILY
Qty: 90 TABLET | Refills: 0 | Status: SHIPPED | OUTPATIENT
Start: 2023-09-01

## 2023-09-01 RX ORDER — CLONAZEPAM 1 MG/1
1 TABLET ORAL NIGHTLY PRN
Qty: 30 TABLET | Refills: 2 | Status: SHIPPED | OUTPATIENT
Start: 2023-09-07 | End: 2023-12-06

## 2023-11-12 ENCOUNTER — PATIENT MESSAGE (OUTPATIENT)
Dept: FAMILY MEDICINE CLINIC | Age: 58
End: 2023-11-12

## 2023-11-12 DIAGNOSIS — E23.0 HYPOGONADOTROPIC HYPOGONADISM (HCC): ICD-10-CM

## 2023-11-12 DIAGNOSIS — I10 PRIMARY HYPERTENSION: Primary | ICD-10-CM

## 2023-11-13 NOTE — TELEPHONE ENCOUNTER
From: Shakir Smith  To: Dr. Gramajo Washington: 11/12/2023 12:26 PM EST  Subject: bloodwork    Hi Doc    My next appointment is in a few weeks. Just giving you a reminder for my blood work orders. Thanks.

## 2023-11-19 DIAGNOSIS — F51.01 PRIMARY INSOMNIA: ICD-10-CM

## 2023-11-20 RX ORDER — CLONAZEPAM 1 MG/1
1 TABLET ORAL NIGHTLY PRN
Qty: 30 TABLET | Refills: 2 | Status: SHIPPED | OUTPATIENT
Start: 2023-11-29 | End: 2024-02-27

## 2023-11-20 NOTE — TELEPHONE ENCOUNTER
Last Appointment:  8/28/2023  Future Appointments   Date Time Provider [address]   12/1/2023 10:00 AM Delmi Pope  [facility] [address]

## 2023-11-21 DIAGNOSIS — I10 PRIMARY HYPERTENSION: ICD-10-CM

## 2023-11-21 DIAGNOSIS — E23.0 HYPOGONADOTROPIC HYPOGONADISM (HCC): ICD-10-CM

## 2023-11-21 LAB
ABSOLUTE IMMATURE GRANULOCYTE: 0.04 K/UL (ref 0–0.58)
ALBUMIN SERPL-MCNC: 4.6 G/DL (ref 3.5–5.2)
ALP BLD-CCNC: 70 U/L (ref 40–129)
ALT SERPL-CCNC: 25 U/L (ref 0–40)
ANION GAP SERPL CALCULATED.3IONS-SCNC: 15 MMOL/L (ref 7–16)
AST SERPL-CCNC: 20 U/L (ref 0–39)
BASOPHILS ABSOLUTE: 0.05 K/UL (ref 0–0.2)
BASOPHILS RELATIVE PERCENT: 1 % (ref 0–2)
BILIRUB SERPL-MCNC: 0.2 MG/DL (ref 0–1.2)
BUN BLDV-MCNC: 14 MG/DL (ref 6–20)
CALCIUM SERPL-MCNC: 9.7 MG/DL (ref 8.6–10.2)
CHLORIDE BLD-SCNC: 104 MMOL/L (ref 98–107)
CHOLESTEROL: 140 MG/DL
CO2: 23 MMOL/L (ref 22–29)
CREAT SERPL-MCNC: 0.8 MG/DL (ref 0.7–1.2)
EOSINOPHILS ABSOLUTE: 0.15 K/UL (ref 0.05–0.5)
EOSINOPHILS RELATIVE PERCENT: 2 % (ref 0–6)
GFR SERPL CREATININE-BSD FRML MDRD: >60 ML/MIN/1.73M2
GLUCOSE BLD-MCNC: 119 MG/DL (ref 74–99)
HCT VFR BLD CALC: 47.1 % (ref 37–54)
HDLC SERPL-MCNC: 58 MG/DL
HEMOGLOBIN: 15.2 G/DL (ref 12.5–16.5)
IMMATURE GRANULOCYTES: 1 % (ref 0–5)
LDL CHOLESTEROL: 35 MG/DL
LYMPHOCYTES ABSOLUTE: 1.65 K/UL (ref 1.5–4)
LYMPHOCYTES RELATIVE PERCENT: 26 % (ref 20–42)
MCH RBC QN AUTO: 31.5 PG (ref 26–35)
MCHC RBC AUTO-ENTMCNC: 32.3 G/DL (ref 32–34.5)
MCV RBC AUTO: 97.7 FL (ref 80–99.9)
MONOCYTES ABSOLUTE: 0.68 K/UL (ref 0.1–0.95)
MONOCYTES RELATIVE PERCENT: 11 % (ref 2–12)
NEUTROPHILS ABSOLUTE: 3.71 K/UL (ref 1.8–7.3)
NEUTROPHILS RELATIVE PERCENT: 59 % (ref 43–80)
PDW BLD-RTO: 14.5 % (ref 11.5–15)
PLATELET # BLD: 299 K/UL (ref 130–450)
PMV BLD AUTO: 10.1 FL (ref 7–12)
POTASSIUM SERPL-SCNC: 4.4 MMOL/L (ref 3.5–5)
RBC # BLD: 4.82 M/UL (ref 3.8–5.8)
SODIUM BLD-SCNC: 142 MMOL/L (ref 132–146)
TESTOSTERONE TOTAL: 630 NG/DL (ref 193–740)
TOTAL PROTEIN: 7.4 G/DL (ref 6.4–8.3)
TRIGL SERPL-MCNC: 236 MG/DL
TSH SERPL DL<=0.05 MIU/L-ACNC: 2.66 UIU/ML (ref 0.27–4.2)
VLDLC SERPL CALC-MCNC: 47 MG/DL
WBC # BLD: 6.3 K/UL (ref 4.5–11.5)

## 2023-11-24 LAB
ESTRADIOL LEVEL: 8.9 PG/ML (ref 10–42)
ESTROGEN TOTAL: 37.7 PG/ML (ref 19–69)
ESTRONE: 28.8 PG/ML (ref 9–36)

## 2023-12-01 ENCOUNTER — OFFICE VISIT (OUTPATIENT)
Dept: FAMILY MEDICINE CLINIC | Age: 58
End: 2023-12-01
Payer: COMMERCIAL

## 2023-12-01 VITALS
WEIGHT: 222 LBS | OXYGEN SATURATION: 96 % | BODY MASS INDEX: 30.07 KG/M2 | HEART RATE: 86 BPM | DIASTOLIC BLOOD PRESSURE: 82 MMHG | SYSTOLIC BLOOD PRESSURE: 146 MMHG | HEIGHT: 72 IN | TEMPERATURE: 98.2 F

## 2023-12-01 DIAGNOSIS — E23.0 HYPOGONADOTROPIC HYPOGONADISM (HCC): Primary | ICD-10-CM

## 2023-12-01 DIAGNOSIS — F43.25 ADJUSTMENT DISORDER WITH MIXED DISTURBANCE OF EMOTIONS AND CONDUCT: ICD-10-CM

## 2023-12-01 DIAGNOSIS — I10 ESSENTIAL HYPERTENSION: ICD-10-CM

## 2023-12-01 DIAGNOSIS — Z23 NEED FOR INFLUENZA VACCINATION: ICD-10-CM

## 2023-12-01 DIAGNOSIS — F33.1 MODERATE EPISODE OF RECURRENT MAJOR DEPRESSIVE DISORDER (HCC): ICD-10-CM

## 2023-12-01 PROCEDURE — 90674 CCIIV4 VAC NO PRSV 0.5 ML IM: CPT | Performed by: FAMILY MEDICINE

## 2023-12-01 PROCEDURE — 90471 IMMUNIZATION ADMIN: CPT | Performed by: FAMILY MEDICINE

## 2023-12-01 PROCEDURE — 3078F DIAST BP <80 MM HG: CPT | Performed by: FAMILY MEDICINE

## 2023-12-01 PROCEDURE — 99214 OFFICE O/P EST MOD 30 MIN: CPT | Performed by: FAMILY MEDICINE

## 2023-12-01 PROCEDURE — 3074F SYST BP LT 130 MM HG: CPT | Performed by: FAMILY MEDICINE

## 2023-12-01 RX ORDER — CLONIDINE HYDROCHLORIDE 0.1 MG/1
TABLET ORAL
Qty: 270 TABLET | Refills: 1 | Status: SHIPPED | OUTPATIENT
Start: 2023-12-01

## 2023-12-01 RX ORDER — LAMOTRIGINE 200 MG/1
200 TABLET ORAL DAILY
Qty: 90 TABLET | Refills: 1 | Status: SHIPPED | OUTPATIENT
Start: 2023-12-01

## 2023-12-01 RX ORDER — MINOCYCLINE HYDROCHLORIDE 100 MG/1
CAPSULE ORAL
COMMUNITY
Start: 2023-11-27

## 2023-12-01 RX ORDER — DILTIAZEM HYDROCHLORIDE 180 MG/1
180 CAPSULE, COATED, EXTENDED RELEASE ORAL 2 TIMES DAILY
Qty: 180 CAPSULE | Refills: 1 | Status: CANCELLED | OUTPATIENT
Start: 2023-12-01

## 2023-12-01 NOTE — PROGRESS NOTES
current facility-administered medications on file prior to visit. Patient Active Problem List   Diagnosis Code    Moderate episode of recurrent major depressive disorder (HCC) F33.1    Primary insomnia F51.01    Anxiety F41.9    Hypogonadotropic hypogonadism (HCC) E23.0    Hypertriglyceridemia E78.1    HTN (hypertension) I10    ZIA (obstructive sleep apnea) G47.33    Overweight E66.3    Insomnia G47.00    TIA (transient ischemic attack) G45.9    Hypotestosteronism E34.9     _________________________________________________________  Past Medical History:   Diagnosis Date    Anxiety     Depression     Diarrhea     for OR 20     HTN (hypertension)     Hypertriglyceridemia     Hypogonadotropic hypogonadism (HCC)     Insomnia     ZIA (obstructive sleep apnea)     no CPAP     Overweight        Family History   Problem Relation Age of Onset    No Known Problems Mother     Alcohol Abuse Father     Other Father          meningitis, low testosterone    Depression Daughter     ADHD Daughter        Past Surgical History:   Procedure Laterality Date    COLONOSCOPY  2012    tubular adenoma and tubulovillous adenoma. COLONOSCOPY N/A 2020    COLORECTAL CANCER SCREENING, NOT HIGH RISK performed by Mary Brown MD at [facility]      back      FOOT SURGERY      left     KNEE ARTHROSCOPY      left     TONSILLECTOMY  1972       Social History     Tobacco Use    Smoking status: Former     Packs/day: 1.00     Years: 8.00     Additional pack years: 0.00     Total pack years: 8.00     Types: Cigarettes     Quit date:      Years since quittin.9    Smokeless tobacco: Never   Vaping Use    Vaping Use: Never used   Substance Use Topics    Alcohol use: Yes     Comment: occasional     Drug use: No       Chart reviewed and updated where appropriate for PMH, Fam, and Soc Hx.  _________________________________________________________  ROS: POSITIVE: As in the HPI.    Otherwise

## 2024-01-10 ENCOUNTER — PATIENT MESSAGE (OUTPATIENT)
Dept: FAMILY MEDICINE CLINIC | Age: 59
End: 2024-01-10

## 2024-01-10 RX ORDER — CLONIDINE 0.1 MG/24H
1 PATCH, EXTENDED RELEASE TRANSDERMAL
Qty: 4 PATCH | Refills: 0 | Status: SHIPPED | OUTPATIENT
Start: 2024-01-10 | End: 2025-01-09

## 2024-01-10 NOTE — TELEPHONE ENCOUNTER
From: Anish Smith  To: Dr. Noah Pope  Sent: 1/10/2024 3:19 PM EST  Subject: Clonidine    Hi Doc,    I don't think that clonidine is the right medicine for me. Since the increased dose, things have changed. I am exhausted all the time. Sleep is worse. Sex drive almost non existent. Angina has appeared again. As far as BP goes, it sometimes got me into the 140's at best. I would like switch to something else. I gave it a good while. However the moderate lower BP from this med is far outweighed by the loss of quality of life. Please advise. Thank you.

## 2024-01-15 ENCOUNTER — OFFICE VISIT (OUTPATIENT)
Dept: FAMILY MEDICINE CLINIC | Age: 59
End: 2024-01-15
Payer: COMMERCIAL

## 2024-01-15 VITALS
OXYGEN SATURATION: 97 % | BODY MASS INDEX: 29.66 KG/M2 | DIASTOLIC BLOOD PRESSURE: 80 MMHG | SYSTOLIC BLOOD PRESSURE: 136 MMHG | HEART RATE: 86 BPM | TEMPERATURE: 98.1 F | WEIGHT: 219 LBS | HEIGHT: 72 IN

## 2024-01-15 DIAGNOSIS — F42.2 MIXED OBSESSIONAL THOUGHTS AND ACTS: Primary | ICD-10-CM

## 2024-01-15 DIAGNOSIS — F33.1 MODERATE EPISODE OF RECURRENT MAJOR DEPRESSIVE DISORDER (HCC): ICD-10-CM

## 2024-01-15 DIAGNOSIS — I10 ESSENTIAL HYPERTENSION: ICD-10-CM

## 2024-01-15 DIAGNOSIS — F43.25 ADJUSTMENT DISORDER WITH MIXED DISTURBANCE OF EMOTIONS AND CONDUCT: ICD-10-CM

## 2024-01-15 PROCEDURE — 99215 OFFICE O/P EST HI 40 MIN: CPT | Performed by: FAMILY MEDICINE

## 2024-01-15 PROCEDURE — 3079F DIAST BP 80-89 MM HG: CPT | Performed by: FAMILY MEDICINE

## 2024-01-15 PROCEDURE — 3075F SYST BP GE 130 - 139MM HG: CPT | Performed by: FAMILY MEDICINE

## 2024-01-15 RX ORDER — LAMOTRIGINE 100 MG/1
TABLET ORAL
Qty: 32 TABLET | Refills: 0 | Status: SHIPPED | OUTPATIENT
Start: 2024-01-15 | End: 2024-02-12

## 2024-01-15 RX ORDER — DILTIAZEM HYDROCHLORIDE 180 MG/1
180 CAPSULE, COATED, EXTENDED RELEASE ORAL 2 TIMES DAILY
Qty: 180 CAPSULE | Refills: 1 | Status: SHIPPED | OUTPATIENT
Start: 2024-01-15

## 2024-01-15 RX ORDER — ATORVASTATIN CALCIUM 40 MG/1
40 TABLET, FILM COATED ORAL NIGHTLY
Qty: 90 TABLET | Refills: 1 | Status: SHIPPED | OUTPATIENT
Start: 2024-01-15

## 2024-01-15 ASSESSMENT — PATIENT HEALTH QUESTIONNAIRE - PHQ9
10. IF YOU CHECKED OFF ANY PROBLEMS, HOW DIFFICULT HAVE THESE PROBLEMS MADE IT FOR YOU TO DO YOUR WORK, TAKE CARE OF THINGS AT HOME, OR GET ALONG WITH OTHER PEOPLE: 0
SUM OF ALL RESPONSES TO PHQ QUESTIONS 1-9: 9
2. FEELING DOWN, DEPRESSED OR HOPELESS: 1
8. MOVING OR SPEAKING SO SLOWLY THAT OTHER PEOPLE COULD HAVE NOTICED. OR THE OPPOSITE, BEING SO FIGETY OR RESTLESS THAT YOU HAVE BEEN MOVING AROUND A LOT MORE THAN USUAL: 0
3. TROUBLE FALLING OR STAYING ASLEEP: 3
SUM OF ALL RESPONSES TO PHQ9 QUESTIONS 1 & 2: 1
4. FEELING TIRED OR HAVING LITTLE ENERGY: 3
1. LITTLE INTEREST OR PLEASURE IN DOING THINGS: 0
9. THOUGHTS THAT YOU WOULD BE BETTER OFF DEAD, OR OF HURTING YOURSELF: 1
6. FEELING BAD ABOUT YOURSELF - OR THAT YOU ARE A FAILURE OR HAVE LET YOURSELF OR YOUR FAMILY DOWN: 1
SUM OF ALL RESPONSES TO PHQ QUESTIONS 1-9: 8
7. TROUBLE CONCENTRATING ON THINGS, SUCH AS READING THE NEWSPAPER OR WATCHING TELEVISION: 0
5. POOR APPETITE OR OVEREATING: 0
SUM OF ALL RESPONSES TO PHQ QUESTIONS 1-9: 9
SUM OF ALL RESPONSES TO PHQ QUESTIONS 1-9: 9

## 2024-01-15 ASSESSMENT — COLUMBIA-SUICIDE SEVERITY RATING SCALE - C-SSRS
5. HAVE YOU STARTED TO WORK OUT OR WORKED OUT THE DETAILS OF HOW TO KILL YOURSELF? DO YOU INTEND TO CARRY OUT THIS PLAN?: NO
4. HAVE YOU HAD THESE THOUGHTS AND HAD SOME INTENTION OF ACTING ON THEM?: NO
1. WITHIN THE PAST MONTH, HAVE YOU WISHED YOU WERE DEAD OR WISHED YOU COULD GO TO SLEEP AND NOT WAKE UP?: YES
3. HAVE YOU BEEN THINKING ABOUT HOW YOU MIGHT KILL YOURSELF?: YES
6. HAVE YOU EVER DONE ANYTHING, STARTED TO DO ANYTHING, OR PREPARED TO DO ANYTHING TO END YOUR LIFE?: NO
2. HAVE YOU ACTUALLY HAD ANY THOUGHTS OF KILLING YOURSELF?: YES
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
## 2024-02-05 DIAGNOSIS — F33.1 MODERATE EPISODE OF RECURRENT MAJOR DEPRESSIVE DISORDER (HCC): ICD-10-CM

## 2024-02-06 RX ORDER — BUPROPION HYDROCHLORIDE 150 MG/1
150 TABLET, EXTENDED RELEASE ORAL 2 TIMES DAILY
Qty: 180 TABLET | Refills: 1 | Status: SHIPPED | OUTPATIENT
Start: 2024-02-06

## 2024-02-06 NOTE — TELEPHONE ENCOUNTER
Last Appointment:  1/15/2024  Future Appointments   Date Time Provider Department Center   3/4/2024 11:00 AM [facility]

## 2024-02-21 DIAGNOSIS — F51.01 PRIMARY INSOMNIA: ICD-10-CM

## 2024-02-23 RX ORDER — CLONAZEPAM 1 MG/1
1 TABLET ORAL NIGHTLY PRN
Qty: 30 TABLET | Refills: 2 | Status: SHIPPED | OUTPATIENT
Start: 2024-02-23 | End: 2024-05-23

## 2024-02-23 NOTE — TELEPHONE ENCOUNTER
Last Appointment:  1/15/2024  Future Appointments   Date Time Provider Department Center   3/4/2024 11:00 AM Noah Pope MD [facility]   4/1/2024 10:40 AM Noah Pope MD [facility]

## 2024-02-28 DIAGNOSIS — E23.0 HYPOGONADOTROPIC HYPOGONADISM (HCC): ICD-10-CM

## 2024-02-28 LAB — TESTOSTERONE TOTAL: 268 NG/DL (ref 193–740)

## 2024-03-02 LAB
ESTRADIOL LEVEL: 11.2 PG/ML (ref 10–42)
ESTROGEN TOTAL: 41.5 PG/ML (ref 19–69)
ESTRONE: 30.3 PG/ML (ref 9–36)

## 2024-03-05 NOTE — TELEPHONE ENCOUNTER
Last Appointment:  1/21/2021  Future Appointments   Date Time Provider Rashel Gongora   2/22/2021  1:00 PM Keenan Tellez  [address] [place] [address]
Never smoker

## 2024-04-01 ENCOUNTER — OFFICE VISIT (OUTPATIENT)
Dept: FAMILY MEDICINE CLINIC | Age: 59
End: 2024-04-01
Payer: COMMERCIAL

## 2024-04-01 VITALS
BODY MASS INDEX: 30.2 KG/M2 | DIASTOLIC BLOOD PRESSURE: 80 MMHG | WEIGHT: 223 LBS | HEART RATE: 81 BPM | TEMPERATURE: 98.7 F | OXYGEN SATURATION: 97 % | SYSTOLIC BLOOD PRESSURE: 142 MMHG | HEIGHT: 72 IN

## 2024-04-01 DIAGNOSIS — E23.0 HYPOGONADOTROPIC HYPOGONADISM (HCC): ICD-10-CM

## 2024-04-01 DIAGNOSIS — I10 ESSENTIAL HYPERTENSION: ICD-10-CM

## 2024-04-01 DIAGNOSIS — R07.9 CHEST PAIN, UNSPECIFIED TYPE: Primary | ICD-10-CM

## 2024-04-01 PROCEDURE — 99214 OFFICE O/P EST MOD 30 MIN: CPT | Performed by: FAMILY MEDICINE

## 2024-04-01 PROCEDURE — 3077F SYST BP >= 140 MM HG: CPT | Performed by: FAMILY MEDICINE

## 2024-04-01 PROCEDURE — 3079F DIAST BP 80-89 MM HG: CPT | Performed by: FAMILY MEDICINE

## 2024-04-01 PROCEDURE — 93000 ELECTROCARDIOGRAM COMPLETE: CPT | Performed by: FAMILY MEDICINE

## 2024-04-01 RX ORDER — CLONIDINE HYDROCHLORIDE 0.1 MG/1
0.1 TABLET ORAL 2 TIMES DAILY
COMMUNITY
Start: 2024-03-18

## 2024-04-01 NOTE — PROGRESS NOTES
On license of [facility]  Office Progress Note - Dr. Pope  4/1/24    CC:   Chief Complaint   Patient presents with    Hypertension        BP (!) 142/80   Pulse 81   Temp 98.7 °F (37.1 °C) (Temporal)   Ht 1.829 m (6')   Wt 101.2 kg (223 lb)   SpO2 97%   BMI 30.24 kg/m²   Wt Readings from Last 3 Encounters:   04/01/24 101.2 kg (223 lb)   01/15/24 99.3 kg (219 lb)   12/01/23 100.7 kg (222 lb)       HPI: Hypertension  Follow-up  Blood pressure is borderline controlled today.  BP Readings from Last 3 Encounters:   04/01/24 (!) 142/80   01/15/24 136/80   12/01/23 (!) 146/82     Patient continues medications regularly. Compliance is good.  Denies sob, abd pain, headaches, vision changes, dizziness, hypotensive symptoms.   No side effects from medications noted.       He is having a \"burning\" feeling in his chest.   Pizza for breakfst   He feels like dec the clonidine to BID from TId has helped.   He tried taking Prilosec but has only been doing it for about a week and it is not clear that he has been taking it every day.  He did not take it this morning.  He is currently having the burning now.  He has no other associated symptoms.  Denies nausea, radiation, dizziness, diaphoresis, etc.    EKG today shows normal sinus rhythm with leftward axis and good R wave progression.  Baseline artifact.  No ST elevations or depressions.  No T wave inversions.  First-degree AV block.  Overall no signs of ischemia or infarction.    T was low more recently without change in the dose.  He went from 630 in Nov to 268.  He's wondering about whether he absorbing the pills.   More difficult to get erects, difficult to ejaculate.     The 10-year ASCVD risk score (Waldo DK, et al., 2019) is: 5.1%    Values used to calculate the score:      Age: 58 years      Sex: Male      Is Non- : No      Diabetic: No      Tobacco smoker: No      Systolic Blood Pressure: 142 mmHg      Is BP treated: Yes      HDL

## 2024-04-05 ENCOUNTER — TELEPHONE (OUTPATIENT)
Dept: CARDIOLOGY | Age: 59
End: 2024-04-05

## 2024-04-05 NOTE — TELEPHONE ENCOUNTER
Left message on voice mail to remind patient of nuclear stress test appointment on April 9, 2024 at 0930. Instructions for test,current medication list, hold catapres the morning of test and hold cardizem for 24 hours before the test, and COVID-19 preprocedure information left on voice mail. Asked patient to call with any questions or if unable to keep appointment.

## 2024-04-09 ENCOUNTER — HOSPITAL ENCOUNTER (OUTPATIENT)
Dept: CARDIOLOGY | Age: 59
Discharge: HOME | End: 2024-04-11

## 2024-04-09 DIAGNOSIS — R07.9 CHEST PAIN, UNSPECIFIED TYPE: ICD-10-CM

## 2024-04-09 DIAGNOSIS — I10 ESSENTIAL HYPERTENSION: ICD-10-CM

## 2024-04-10 ENCOUNTER — HOSPITAL ENCOUNTER (OUTPATIENT)
Dept: CARDIOLOGY | Age: 59
Discharge: HOME | End: 2024-04-12
Payer: COMMERCIAL

## 2024-04-10 VITALS
BODY MASS INDEX: 31.22 KG/M2 | HEART RATE: 70 BPM | DIASTOLIC BLOOD PRESSURE: 90 MMHG | WEIGHT: 223 LBS | RESPIRATION RATE: 16 BRPM | HEIGHT: 71 IN | SYSTOLIC BLOOD PRESSURE: 132 MMHG

## 2024-04-10 DIAGNOSIS — R07.9 CHEST PAIN, UNSPECIFIED TYPE: Primary | ICD-10-CM

## 2024-04-10 LAB
ECHO BSA: 2.25 M2
NUC REST EJECTION FRACTION: 67 %
STRESS BASELINE DIAS BP: 90 MMHG
STRESS BASELINE HR: 71 BPM
STRESS BASELINE SYS BP: 132 MMHG
STRESS ESTIMATED WORKLOAD: 12 METS
STRESS EXERCISE DUR MIN: 9 MIN
STRESS EXERCISE DUR SEC: 37 SEC
STRESS O2 SAT PEAK: 97 %
STRESS O2 SAT REST: 97 %
STRESS PEAK DIAS BP: 80 MMHG
STRESS PEAK SYS BP: 182 MMHG
STRESS PERCENT HR ACHIEVED: 85 %
STRESS POST PEAK HR: 139 BPM
STRESS RATE PRESSURE PRODUCT: NORMAL BPM*MMHG
STRESS TARGET HR: 164 BPM

## 2024-04-10 PROCEDURE — 3430000000 HC RX DIAGNOSTIC RADIOPHARMACEUTICAL: Performed by: INTERNAL MEDICINE

## 2024-04-10 PROCEDURE — 93017 CV STRESS TEST TRACING ONLY: CPT

## 2024-04-10 PROCEDURE — 78452 HT MUSCLE IMAGE SPECT MULT: CPT | Performed by: INTERNAL MEDICINE

## 2024-04-10 PROCEDURE — A9500 TC99M SESTAMIBI: HCPCS | Performed by: INTERNAL MEDICINE

## 2024-04-10 PROCEDURE — 93018 CV STRESS TEST I&R ONLY: CPT | Performed by: INTERNAL MEDICINE

## 2024-04-10 PROCEDURE — 93016 CV STRESS TEST SUPVJ ONLY: CPT | Performed by: INTERNAL MEDICINE

## 2024-04-10 PROCEDURE — 2580000003 HC RX 258: Performed by: INTERNAL MEDICINE

## 2024-04-10 RX ORDER — SODIUM CHLORIDE 0.9 % (FLUSH) 0.9 %
10 SYRINGE (ML) INJECTION PRN
Status: DISCONTINUED | OUTPATIENT
Start: 2024-04-10 | End: 2024-04-13 | Stop reason: HOSPADM

## 2024-04-10 RX ORDER — LAMOTRIGINE 100 MG/1
100 TABLET ORAL DAILY
COMMUNITY

## 2024-04-10 RX ORDER — TETRAKIS(2-METHOXYISOBUTYLISOCYANIDE)COPPER(I) TETRAFLUOROBORATE 1 MG/ML
10.1 INJECTION, POWDER, LYOPHILIZED, FOR SOLUTION INTRAVENOUS
Status: COMPLETED | OUTPATIENT
Start: 2024-04-10 | End: 2024-04-10

## 2024-04-10 RX ORDER — TETRAKIS(2-METHOXYISOBUTYLISOCYANIDE)COPPER(I) TETRAFLUOROBORATE 1 MG/ML
32.2 INJECTION, POWDER, LYOPHILIZED, FOR SOLUTION INTRAVENOUS
Status: COMPLETED | OUTPATIENT
Start: 2024-04-10 | End: 2024-04-10

## 2024-04-10 RX ORDER — MULTIVIT-MIN/IRON/FOLIC ACID/K 18-600-40
1 CAPSULE ORAL DAILY
COMMUNITY

## 2024-04-10 RX ADMIN — SODIUM CHLORIDE, PRESERVATIVE FREE 10 ML: 5 INJECTION INTRAVENOUS at 10:45

## 2024-04-10 RX ADMIN — SODIUM CHLORIDE, PRESERVATIVE FREE 10 ML: 5 INJECTION INTRAVENOUS at 09:07

## 2024-04-10 RX ADMIN — Medication 10.1 MILLICURIE: at 09:06

## 2024-04-10 RX ADMIN — Medication 32.2 MILLICURIE: at 10:44

## 2024-04-11 ENCOUNTER — TELEPHONE (OUTPATIENT)
Dept: FAMILY MEDICINE CLINIC | Age: 59
End: 2024-04-11

## 2024-04-16 ENCOUNTER — PATIENT MESSAGE (OUTPATIENT)
Dept: FAMILY MEDICINE CLINIC | Age: 59
End: 2024-04-16

## 2024-04-16 DIAGNOSIS — E23.0 HYPOGONADOTROPIC HYPOGONADISM (HCC): Primary | ICD-10-CM

## 2024-04-16 NOTE — TELEPHONE ENCOUNTER
From: Anish Smith  To: Dr. Noah Pope  Sent: 4/16/2024 11:57 AM EDT  Subject: Testosterone    The stress test was good. I started back on several thousand units of vitamin D a day. All symptoms are gone. As far as the testosterone situation, I have put a lot of thought into it. And discussed it with Afia. If your still on board I would like to go back to the shot. No absorption issues that way. Twice weekly shots, as we discussed. Split dose to help further prevent estrogen problems.

## 2024-05-20 DIAGNOSIS — F51.01 PRIMARY INSOMNIA: ICD-10-CM

## 2024-05-20 NOTE — TELEPHONE ENCOUNTER
Last Appointment:  4/1/2024  Future Appointments   Date Time Provider Department Center   8/16/2024  9:40 AM [facility]

## 2024-05-21 RX ORDER — CLONAZEPAM 1 MG/1
1 TABLET ORAL NIGHTLY PRN
Qty: 30 TABLET | Refills: 2 | Status: SHIPPED | OUTPATIENT
Start: 2024-05-21 | End: 2024-08-19

## 2024-07-08 DIAGNOSIS — F33.1 MODERATE EPISODE OF RECURRENT MAJOR DEPRESSIVE DISORDER (HCC): ICD-10-CM

## 2024-07-08 RX ORDER — BUPROPION HYDROCHLORIDE 150 MG/1
150 TABLET, EXTENDED RELEASE ORAL 2 TIMES DAILY
Qty: 180 TABLET | Refills: 1 | Status: SHIPPED | OUTPATIENT
Start: 2024-07-08

## 2024-07-08 NOTE — TELEPHONE ENCOUNTER
Last Appointment:  4/1/2024  Future Appointments   Date Time Provider Department Center   8/16/2024  9:40 AM [facility]

## 2024-07-22 RX ORDER — ATORVASTATIN CALCIUM 40 MG/1
40 TABLET, FILM COATED ORAL NIGHTLY
Qty: 90 TABLET | Refills: 1 | Status: SHIPPED | OUTPATIENT
Start: 2024-07-22

## 2024-07-22 NOTE — TELEPHONE ENCOUNTER
Last Appointment:  4/1/2024  Future Appointments   Date Time Provider Department Center   8/27/2024  3:00 PM [facility]

## 2024-08-15 DIAGNOSIS — E23.0 HYPOGONADOTROPIC HYPOGONADISM (HCC): ICD-10-CM

## 2024-08-17 LAB
SEX HORMONE BINDING GLOBULIN: 22 NMOL/L (ref 19–76)
TESTOSTERONE FREE-NONMALE: 217.4 PG/ML (ref 47–244)
TESTOSTERONE TOTAL: 782 NG/DL (ref 193–740)
TESTOSTERONE, BIOAVAILABLE: 509.3 NG/DL (ref 130–680)

## 2024-08-26 SDOH — ECONOMIC STABILITY: TRANSPORTATION INSECURITY
IN THE PAST 12 MONTHS, HAS LACK OF TRANSPORTATION KEPT YOU FROM MEETINGS, WORK, OR FROM GETTING THINGS NEEDED FOR DAILY LIVING?: NO

## 2024-08-26 SDOH — ECONOMIC STABILITY: FOOD INSECURITY: WITHIN THE PAST 12 MONTHS, THE FOOD YOU BOUGHT JUST DIDN'T LAST AND YOU DIDN'T HAVE MONEY TO GET MORE.: NEVER TRUE

## 2024-08-26 SDOH — ECONOMIC STABILITY: INCOME INSECURITY: HOW HARD IS IT FOR YOU TO PAY FOR THE VERY BASICS LIKE FOOD, HOUSING, MEDICAL CARE, AND HEATING?: NOT HARD AT ALL

## 2024-09-09 DIAGNOSIS — F51.01 PRIMARY INSOMNIA: ICD-10-CM

## 2024-09-09 RX ORDER — CLONAZEPAM 1 MG/1
1 TABLET ORAL NIGHTLY PRN
Qty: 30 TABLET | Refills: 2 | Status: SHIPPED | OUTPATIENT
Start: 2024-09-09 | End: 2024-12-08

## 2024-09-14 SDOH — ECONOMIC STABILITY: FOOD INSECURITY: WITHIN THE PAST 12 MONTHS, YOU WORRIED THAT YOUR FOOD WOULD RUN OUT BEFORE YOU GOT MONEY TO BUY MORE.: NEVER TRUE

## 2024-09-14 SDOH — ECONOMIC STABILITY: FOOD INSECURITY: WITHIN THE PAST 12 MONTHS, THE FOOD YOU BOUGHT JUST DIDN'T LAST AND YOU DIDN'T HAVE MONEY TO GET MORE.: NEVER TRUE

## 2024-09-14 SDOH — ECONOMIC STABILITY: INCOME INSECURITY: HOW HARD IS IT FOR YOU TO PAY FOR THE VERY BASICS LIKE FOOD, HOUSING, MEDICAL CARE, AND HEATING?: NOT HARD AT ALL

## 2024-09-16 ENCOUNTER — OFFICE VISIT (OUTPATIENT)
Dept: FAMILY MEDICINE CLINIC | Age: 59
End: 2024-09-16

## 2024-09-16 VITALS
TEMPERATURE: 98.7 F | DIASTOLIC BLOOD PRESSURE: 78 MMHG | SYSTOLIC BLOOD PRESSURE: 134 MMHG | WEIGHT: 222 LBS | OXYGEN SATURATION: 95 % | HEART RATE: 91 BPM | BODY MASS INDEX: 31.08 KG/M2 | HEIGHT: 71 IN

## 2024-09-16 DIAGNOSIS — Z12.5 SCREENING FOR MALIGNANT NEOPLASM OF PROSTATE: ICD-10-CM

## 2024-09-16 DIAGNOSIS — E23.0 HYPOGONADOTROPIC HYPOGONADISM (HCC): ICD-10-CM

## 2024-09-16 DIAGNOSIS — Z23 NEED FOR INFLUENZA VACCINATION: Primary | ICD-10-CM

## 2024-09-16 DIAGNOSIS — I10 ESSENTIAL HYPERTENSION: ICD-10-CM

## 2024-09-16 LAB
ALBUMIN: 4.3 G/DL (ref 3.5–5.2)
ALP BLD-CCNC: 63 U/L (ref 40–129)
ALT SERPL-CCNC: 28 U/L (ref 0–40)
ANION GAP SERPL CALCULATED.3IONS-SCNC: 17 MMOL/L (ref 7–16)
AST SERPL-CCNC: 27 U/L (ref 0–39)
BASOPHILS ABSOLUTE: 0.07 K/UL (ref 0–0.2)
BASOPHILS RELATIVE PERCENT: 1 % (ref 0–2)
BILIRUB SERPL-MCNC: 0.5 MG/DL (ref 0–1.2)
BUN BLDV-MCNC: 15 MG/DL (ref 6–20)
CALCIUM SERPL-MCNC: 10.1 MG/DL (ref 8.6–10.2)
CHLORIDE BLD-SCNC: 103 MMOL/L (ref 98–107)
CO2: 24 MMOL/L (ref 22–29)
CREAT SERPL-MCNC: 1.2 MG/DL (ref 0.7–1.2)
EOSINOPHILS ABSOLUTE: 0.25 K/UL (ref 0.05–0.5)
EOSINOPHILS RELATIVE PERCENT: 4 % (ref 0–6)
GFR, ESTIMATED: 72 ML/MIN/1.73M2
GLUCOSE BLD-MCNC: 136 MG/DL (ref 74–99)
HCT VFR BLD CALC: 49.3 % (ref 37–54)
HEMOGLOBIN: 15.9 G/DL (ref 12.5–16.5)
IMMATURE GRANULOCYTES %: 0 % (ref 0–5)
IMMATURE GRANULOCYTES ABSOLUTE: <0.03 K/UL (ref 0–0.58)
LYMPHOCYTES ABSOLUTE: 1.89 K/UL (ref 1.5–4)
LYMPHOCYTES RELATIVE PERCENT: 29 % (ref 20–42)
MCH RBC QN AUTO: 31 PG (ref 26–35)
MCHC RBC AUTO-ENTMCNC: 32.3 G/DL (ref 32–34.5)
MCV RBC AUTO: 96.1 FL (ref 80–99.9)
MONOCYTES ABSOLUTE: 0.74 K/UL (ref 0.1–0.95)
MONOCYTES RELATIVE PERCENT: 11 % (ref 2–12)
NEUTROPHILS ABSOLUTE: 3.66 K/UL (ref 1.8–7.3)
NEUTROPHILS RELATIVE PERCENT: 55 % (ref 43–80)
PDW BLD-RTO: 14.9 % (ref 11.5–15)
PLATELET # BLD: 284 K/UL (ref 130–450)
PMV BLD AUTO: 10.3 FL (ref 7–12)
POTASSIUM SERPL-SCNC: 4.3 MMOL/L (ref 3.5–5)
PROSTATE SPECIFIC ANTIGEN: 2.95 NG/ML (ref 0–4)
RBC # BLD: 5.13 M/UL (ref 3.8–5.8)
SODIUM BLD-SCNC: 144 MMOL/L (ref 132–146)
TOTAL PROTEIN: 7.2 G/DL (ref 6.4–8.3)
WBC # BLD: 6.6 K/UL (ref 4.5–11.5)

## 2024-09-16 RX ORDER — DILTIAZEM HYDROCHLORIDE 180 MG/1
180 CAPSULE, COATED, EXTENDED RELEASE ORAL 2 TIMES DAILY
Qty: 180 CAPSULE | Refills: 1 | Status: SHIPPED | OUTPATIENT
Start: 2024-09-16

## 2024-09-16 RX ORDER — CLONIDINE HYDROCHLORIDE 0.1 MG/1
0.1 TABLET ORAL 2 TIMES DAILY
Qty: 60 TABLET | Refills: 5 | Status: SHIPPED | OUTPATIENT
Start: 2024-09-16

## 2024-09-16 RX ORDER — CYANOCOBALAMIN 1000 UG/ML
1000 INJECTION, SOLUTION INTRAMUSCULAR; SUBCUTANEOUS
COMMUNITY

## 2024-09-16 RX ORDER — TESTOSTERONE CYPIONATE 200 MG/ML
100 INJECTION, SOLUTION INTRAMUSCULAR WEEKLY
Qty: 6 ML | Refills: 0 | Status: SHIPPED | OUTPATIENT
Start: 2024-09-16 | End: 2024-12-03

## 2024-09-18 LAB
SEX HORMONE BINDING GLOBULIN: 21 NMOL/L (ref 19–76)
TESTOSTERONE FREE-NONMALE: 144.6 PG/ML (ref 47–244)
TESTOSTERONE TOTAL: 541 NG/DL (ref 193–740)
TESTOSTERONE, BIOAVAILABLE: 338.8 NG/DL (ref 130–680)

## 2024-09-21 LAB
ESTRADIOL LEVEL: 43.4 PG/ML (ref 10–42)
ESTROGEN TOTAL: 85.8 PG/ML (ref 19–69)
ESTRONE: 42.4 PG/ML (ref 9–36)

## 2024-09-29 DIAGNOSIS — F33.1 MODERATE EPISODE OF RECURRENT MAJOR DEPRESSIVE DISORDER (HCC): ICD-10-CM

## 2024-09-30 RX ORDER — ERGOCALCIFEROL 1.25 MG/1
50000 CAPSULE, LIQUID FILLED ORAL WEEKLY
Qty: 12 CAPSULE | Refills: 1 | Status: SHIPPED | OUTPATIENT
Start: 2024-09-30

## 2024-09-30 NOTE — TELEPHONE ENCOUNTER
Last Appointment:  9/16/2024  Future Appointments   Date Time Provider Department Center   11/18/2024 10:20 AM Noah Pope MD [facility]

## 2024-10-21 DIAGNOSIS — E23.0 HYPOGONADOTROPIC HYPOGONADISM (HCC): ICD-10-CM

## 2024-10-21 DIAGNOSIS — E23.0 HYPOGONADOTROPIC HYPOGONADISM (HCC): Primary | ICD-10-CM

## 2024-10-21 DIAGNOSIS — E53.8 B12 DEFICIENCY: ICD-10-CM

## 2024-10-21 LAB
ALBUMIN: 4.2 G/DL (ref 3.5–5.2)
ALP BLD-CCNC: 62 U/L (ref 40–129)
ALT SERPL-CCNC: 27 U/L (ref 0–40)
ANION GAP SERPL CALCULATED.3IONS-SCNC: 8 MMOL/L (ref 7–16)
AST SERPL-CCNC: 27 U/L (ref 0–39)
BASOPHILS ABSOLUTE: 0.07 K/UL (ref 0–0.2)
BASOPHILS RELATIVE PERCENT: 1 % (ref 0–2)
BILIRUB SERPL-MCNC: 0.3 MG/DL (ref 0–1.2)
BUN BLDV-MCNC: 15 MG/DL (ref 6–20)
CALCIUM SERPL-MCNC: 8.8 MG/DL (ref 8.6–10.2)
CHLORIDE BLD-SCNC: 105 MMOL/L (ref 98–107)
CO2: 25 MMOL/L (ref 22–29)
CREAT SERPL-MCNC: 0.8 MG/DL (ref 0.7–1.2)
EOSINOPHILS ABSOLUTE: 0.28 K/UL (ref 0.05–0.5)
EOSINOPHILS RELATIVE PERCENT: 5 % (ref 0–6)
FOLATE: >20 NG/ML (ref 4.8–24.2)
GFR, ESTIMATED: >90 ML/MIN/1.73M2
GLUCOSE BLD-MCNC: 88 MG/DL (ref 74–99)
HCT VFR BLD CALC: 51.5 % (ref 37–54)
HEMOGLOBIN: 16.2 G/DL (ref 12.5–16.5)
IMMATURE GRANULOCYTES %: 0 % (ref 0–5)
IMMATURE GRANULOCYTES ABSOLUTE: <0.03 K/UL (ref 0–0.58)
LYMPHOCYTES ABSOLUTE: 1.71 K/UL (ref 1.5–4)
LYMPHOCYTES RELATIVE PERCENT: 30 % (ref 20–42)
MCH RBC QN AUTO: 30.7 PG (ref 26–35)
MCHC RBC AUTO-ENTMCNC: 31.5 G/DL (ref 32–34.5)
MCV RBC AUTO: 97.5 FL (ref 80–99.9)
MONOCYTES ABSOLUTE: 0.61 K/UL (ref 0.1–0.95)
MONOCYTES RELATIVE PERCENT: 11 % (ref 2–12)
NEUTROPHILS ABSOLUTE: 2.97 K/UL (ref 1.8–7.3)
NEUTROPHILS RELATIVE PERCENT: 53 % (ref 43–80)
PDW BLD-RTO: 14.9 % (ref 11.5–15)
PLATELET # BLD: 259 K/UL (ref 130–450)
PMV BLD AUTO: 10.3 FL (ref 7–12)
POTASSIUM SERPL-SCNC: 4.8 MMOL/L (ref 3.5–5)
RBC # BLD: 5.28 M/UL (ref 3.8–5.8)
SODIUM BLD-SCNC: 138 MMOL/L (ref 132–146)
TOTAL PROTEIN: 7.2 G/DL (ref 6.4–8.3)
VITAMIN B-12: 854 PG/ML (ref 211–946)
WBC # BLD: 5.7 K/UL (ref 4.5–11.5)

## 2024-10-23 LAB
SEX HORMONE BINDING GLOBULIN: 21 NMOL/L (ref 19–76)
TESTOSTERONE FREE-NONMALE: 206.3 PG/ML (ref 47–244)
TESTOSTERONE TOTAL: 736 NG/DL (ref 193–740)
TESTOSTERONE, BIOAVAILABLE: 483.4 NG/DL (ref 130–680)

## 2024-11-20 ENCOUNTER — PATIENT MESSAGE (OUTPATIENT)
Dept: FAMILY MEDICINE CLINIC | Age: 59
End: 2024-11-20

## 2024-11-20 DIAGNOSIS — E23.0 HYPOGONADOTROPIC HYPOGONADISM (HCC): Primary | ICD-10-CM

## 2024-11-20 DIAGNOSIS — I10 PRIMARY HYPERTENSION: ICD-10-CM

## 2024-11-21 DIAGNOSIS — E23.0 HYPOGONADOTROPIC HYPOGONADISM (HCC): ICD-10-CM

## 2024-11-21 DIAGNOSIS — I10 PRIMARY HYPERTENSION: ICD-10-CM

## 2024-11-21 LAB
ALBUMIN: 4.1 G/DL (ref 3.5–5.2)
ALP BLD-CCNC: 77 U/L (ref 40–129)
ALT SERPL-CCNC: 42 U/L (ref 0–40)
ANION GAP SERPL CALCULATED.3IONS-SCNC: 12 MMOL/L (ref 7–16)
AST SERPL-CCNC: 26 U/L (ref 0–39)
BASOPHILS ABSOLUTE: 0.06 K/UL (ref 0–0.2)
BASOPHILS RELATIVE PERCENT: 1 % (ref 0–2)
BILIRUB SERPL-MCNC: 0.2 MG/DL (ref 0–1.2)
BUN BLDV-MCNC: 18 MG/DL (ref 6–20)
CALCIUM SERPL-MCNC: 9.4 MG/DL (ref 8.6–10.2)
CHLORIDE BLD-SCNC: 106 MMOL/L (ref 98–107)
CHOLESTEROL, TOTAL: 126 MG/DL
CO2: 26 MMOL/L (ref 22–29)
CREAT SERPL-MCNC: 1 MG/DL (ref 0.7–1.2)
EOSINOPHILS ABSOLUTE: 0.27 K/UL (ref 0.05–0.5)
EOSINOPHILS RELATIVE PERCENT: 4 % (ref 0–6)
FOLATE: 14.6 NG/ML (ref 4.8–24.2)
GFR, ESTIMATED: 88 ML/MIN/1.73M2
GLUCOSE BLD-MCNC: 124 MG/DL (ref 74–99)
HCT VFR BLD CALC: 50.9 % (ref 37–54)
HDLC SERPL-MCNC: 41 MG/DL
HEMOGLOBIN: 16.4 G/DL (ref 12.5–16.5)
IMMATURE GRANULOCYTES %: 0 % (ref 0–5)
IMMATURE GRANULOCYTES ABSOLUTE: <0.03 K/UL (ref 0–0.58)
LDL CHOLESTEROL: 17 MG/DL
LYMPHOCYTES ABSOLUTE: 2.19 K/UL (ref 1.5–4)
LYMPHOCYTES RELATIVE PERCENT: 30 % (ref 20–42)
MCH RBC QN AUTO: 30.7 PG (ref 26–35)
MCHC RBC AUTO-ENTMCNC: 32.2 G/DL (ref 32–34.5)
MCV RBC AUTO: 95.1 FL (ref 80–99.9)
MONOCYTES ABSOLUTE: 0.76 K/UL (ref 0.1–0.95)
MONOCYTES RELATIVE PERCENT: 11 % (ref 2–12)
NEUTROPHILS ABSOLUTE: 3.91 K/UL (ref 1.8–7.3)
NEUTROPHILS RELATIVE PERCENT: 54 % (ref 43–80)
PDW BLD-RTO: 14.3 % (ref 11.5–15)
PLATELET # BLD: 274 K/UL (ref 130–450)
PMV BLD AUTO: 10.5 FL (ref 7–12)
POTASSIUM SERPL-SCNC: 4.2 MMOL/L (ref 3.5–5)
RBC # BLD: 5.35 M/UL (ref 3.8–5.8)
SODIUM BLD-SCNC: 144 MMOL/L (ref 132–146)
TOTAL PROTEIN: 7 G/DL (ref 6.4–8.3)
TRIGL SERPL-MCNC: 340 MG/DL
TSH SERPL DL<=0.05 MIU/L-ACNC: 1.72 UIU/ML (ref 0.27–4.2)
VITAMIN B-12: 858 PG/ML (ref 211–946)
VLDLC SERPL CALC-MCNC: 68 MG/DL
WBC # BLD: 7.2 K/UL (ref 4.5–11.5)

## 2024-11-23 LAB
SEX HORMONE BINDING GLOBULIN: 14 NMOL/L (ref 19–76)
TESTOSTERONE FREE-NONMALE: 91.9 PG/ML (ref 47–244)
TESTOSTERONE TOTAL: 313 NG/DL (ref 193–740)
TESTOSTERONE, BIOAVAILABLE: 215.4 NG/DL (ref 130–680)

## 2024-11-27 LAB
ESTRADIOL LEVEL: 3.2 PG/ML (ref 10–42)
ESTROGEN TOTAL: ABNORMAL PG/ML (ref 19–69)
ESTRONE: <2 PG/ML (ref 9–36)

## 2024-12-03 ENCOUNTER — OFFICE VISIT (OUTPATIENT)
Dept: FAMILY MEDICINE CLINIC | Age: 59
End: 2024-12-03

## 2024-12-03 VITALS
BODY MASS INDEX: 32.76 KG/M2 | HEIGHT: 71 IN | WEIGHT: 234 LBS | OXYGEN SATURATION: 94 % | TEMPERATURE: 98.6 F | DIASTOLIC BLOOD PRESSURE: 86 MMHG | HEART RATE: 90 BPM | SYSTOLIC BLOOD PRESSURE: 140 MMHG

## 2024-12-03 DIAGNOSIS — E23.0 HYPOGONADOTROPIC HYPOGONADISM (HCC): ICD-10-CM

## 2024-12-03 DIAGNOSIS — G89.29 CHRONIC HEEL PAIN, LEFT: Primary | ICD-10-CM

## 2024-12-03 DIAGNOSIS — R20.2 HAND TINGLING: ICD-10-CM

## 2024-12-03 DIAGNOSIS — G47.33 OBSTRUCTIVE SLEEP APNEA: ICD-10-CM

## 2024-12-03 DIAGNOSIS — M79.672 CHRONIC HEEL PAIN, LEFT: Primary | ICD-10-CM

## 2024-12-03 RX ORDER — CLONAZEPAM 1 MG/1
1 TABLET ORAL NIGHTLY PRN
Qty: 30 TABLET | Refills: 2 | Status: SHIPPED | OUTPATIENT
Start: 2024-12-03 | End: 2025-03-03

## 2024-12-03 RX ORDER — TESTOSTERONE CYPIONATE 200 MG/ML
100 INJECTION, SOLUTION INTRAMUSCULAR WEEKLY
Qty: 6 ML | Refills: 0 | Status: SHIPPED | OUTPATIENT
Start: 2024-12-03 | End: 2025-02-19

## 2024-12-03 RX ORDER — CLONIDINE HYDROCHLORIDE 0.1 MG/1
0.1 TABLET ORAL 2 TIMES DAILY
Qty: 180 TABLET | Refills: 1 | Status: SHIPPED | OUTPATIENT
Start: 2024-12-03

## 2024-12-03 RX ORDER — ATORVASTATIN CALCIUM 40 MG/1
40 TABLET, FILM COATED ORAL NIGHTLY
Qty: 90 TABLET | Refills: 1 | Status: SHIPPED | OUTPATIENT
Start: 2024-12-03

## 2024-12-03 RX ORDER — BUPROPION HYDROCHLORIDE 150 MG/1
150 TABLET, EXTENDED RELEASE ORAL 2 TIMES DAILY
Qty: 180 TABLET | Refills: 1 | Status: SHIPPED | OUTPATIENT
Start: 2024-12-03

## 2024-12-03 NOTE — PROGRESS NOTES
[facility]  Office Progress Note - Dr. Pope  12/3/24    CC:   Chief Complaint   Patient presents with    Hormone Issues     Testosterone follow up.        BP (!) 140/86   Pulse 90   Temp 98.6 °F (37 °C) (Temporal)   Ht 1.803 m (5' 11\")   Wt 106.1 kg (234 lb)   SpO2 94%   BMI 32.64 kg/m²   Wt Readings from Last 3 Encounters:   12/03/24 106.1 kg (234 lb)   09/16/24 100.7 kg (222 lb)   04/10/24 101.2 kg (223 lb)       HPI: ZIA testing pending, complete but report awaiting.      Discussed T  He is frustrated with variable testosterone levels, no support from the on aromatase inhibitor, varying estrogen levels, and side effects that he feels which come along with that.  Ultimately we would like to get him set up with an endocrinologist who will give some opinions on management.    Blood pressure borderline today.  Similar to historical readings.  Continue same regimen.    He has been having some anterior heel pain which seem consistent with plantar fasciitis.  It is worse on standing after sitting for a while.    He is having some tingling and numbness in his hand usually upon waking but if he hangs his arm down it goes away.  Not particularly bothersome yet.  Sounds like carpal tunnel syndrome most likely.    _________________________________________________________    Assessment / Plan  Anish was seen today for hormone issues.    Diagnoses and all orders for this visit:    Chronic heel pain, left  -     XR CALCANEUS LEFT (MIN 2 VIEWS); Future  X-ray report reviewed and no significant spurs.  I think this is plantar fasciitis.  I will have him review some plantar fascia exercises and read about the condition and he can manage at home.    Hypogonadotropic hypogonadism (HCC)  -     testosterone cypionate (DEPOTESTOTERONE CYPIONATE) 200 MG/ML injection; Inject 0.5 mLs into the muscle once a week for 12 doses. Max Daily Amount: 100 mg    Obstructive sleep apnea  Awaiting report from home 
no

## 2024-12-05 PROBLEM — G45.9 TIA (TRANSIENT ISCHEMIC ATTACK): Status: RESOLVED | Noted: 2019-06-08 | Resolved: 2024-12-05

## 2024-12-06 ENCOUNTER — TELEPHONE (OUTPATIENT)
Dept: PRIMARY CARE CLINIC | Age: 59
End: 2024-12-06

## 2024-12-06 NOTE — TELEPHONE ENCOUNTER
Did not know this.  Thanks for the update.    Electronically signed by Brittni Montejo LPN on 12/6/2024 at 1:08 PM

## 2024-12-06 NOTE — TELEPHONE ENCOUNTER
Last Appointment:  12/3/2024  Future Appointments   Date Time Provider Department Center   2/11/2025  1:40 PM Noah Pope MD [facility]      Dr. Darrin Acevedo has retired.  Will need new referral order.    Electronically signed by Brittni Montejo LPN on 12/6/2024 at 12:29 PM

## 2025-02-03 DIAGNOSIS — I10 ESSENTIAL HYPERTENSION: ICD-10-CM

## 2025-02-04 RX ORDER — DILTIAZEM HYDROCHLORIDE 180 MG/1
CAPSULE, COATED, EXTENDED RELEASE ORAL
Qty: 180 CAPSULE | Refills: 1 | Status: SHIPPED | OUTPATIENT
Start: 2025-02-04

## 2025-02-04 NOTE — TELEPHONE ENCOUNTER
Name of Medication(s) Requested:  Requested Prescriptions     Pending Prescriptions Disp Refills    dilTIAZem (CARDIZEM CD) 180 MG extended release capsule [Pharmacy Med Name: DILTIAZEM CD CAP 180MG/24] 180 capsule 1     Sig: TAKE 1 CAPSULE TWICE DAILY FOR BLOOD PRESSURE       Medication is on current medication list Yes    Dosage and directions were verified? Yes    Quantity verified: 90 day supply     Pharmacy Verified?  Yes    Last Appointment:  12/3/2024    Future appts:  Future Appointments   Date Time Provider Department Center   2/17/2025  1:20 PM Noah Pope MD [facility]        (If no appt send self scheduling link. .REFILLAPPT)  Scheduling request sent?     [] Yes  [x] No    Does patient need updated?  [] Yes  [x] No

## 2025-03-05 DIAGNOSIS — F51.01 PRIMARY INSOMNIA: Primary | ICD-10-CM

## 2025-03-05 RX ORDER — CLONAZEPAM 1 MG/1
1 TABLET ORAL NIGHTLY PRN
Qty: 30 TABLET | Refills: 2 | Status: SHIPPED | OUTPATIENT
Start: 2025-03-05 | End: 2025-06-03

## 2025-03-05 NOTE — TELEPHONE ENCOUNTER
Name of Medication(s) Requested:  Requested Prescriptions     Pending Prescriptions Disp Refills    clonazePAM (KLONOPIN) 1 MG tablet 30 tablet 2     Sig: Take 1 tablet by mouth nightly as needed (sleep) for up to 90 days.       Medication is on current medication list Yes    Dosage and directions were verified? Yes    Quantity verified: 30 day supply     Pharmacy Verified?  Yes    Last Appointment:  12/3/2024    Future appts:  Future Appointments   Date Time Provider Department Center   3/18/2025  3:00 PM Noah Pope MD [facility] DE[facility]        (If no appt send self scheduling link. .REFILLAPPT)  Scheduling request sent?     [] Yes  [x] No    Does patient need updated?  [] Yes  [x] No

## 2025-03-15 SDOH — ECONOMIC STABILITY: FOOD INSECURITY: WITHIN THE PAST 12 MONTHS, THE FOOD YOU BOUGHT JUST DIDN'T LAST AND YOU DIDN'T HAVE MONEY TO GET MORE.: NEVER TRUE

## 2025-03-15 SDOH — ECONOMIC STABILITY: INCOME INSECURITY: IN THE LAST 12 MONTHS, WAS THERE A TIME WHEN YOU WERE NOT ABLE TO PAY THE MORTGAGE OR RENT ON TIME?: NO

## 2025-03-15 SDOH — ECONOMIC STABILITY: TRANSPORTATION INSECURITY
IN THE PAST 12 MONTHS, HAS THE LACK OF TRANSPORTATION KEPT YOU FROM MEDICAL APPOINTMENTS OR FROM GETTING MEDICATIONS?: NO

## 2025-03-15 ASSESSMENT — PATIENT HEALTH QUESTIONNAIRE - PHQ9
4. FEELING TIRED OR HAVING LITTLE ENERGY: MORE THAN HALF THE DAYS
10. IF YOU CHECKED OFF ANY PROBLEMS, HOW DIFFICULT HAVE THESE PROBLEMS MADE IT FOR YOU TO DO YOUR WORK, TAKE CARE OF THINGS AT HOME, OR GET ALONG WITH OTHER PEOPLE: SOMEWHAT DIFFICULT
6. FEELING BAD ABOUT YOURSELF - OR THAT YOU ARE A FAILURE OR HAVE LET YOURSELF OR YOUR FAMILY DOWN: NOT AT ALL
8. MOVING OR SPEAKING SO SLOWLY THAT OTHER PEOPLE COULD HAVE NOTICED. OR THE OPPOSITE, BEING SO FIGETY OR RESTLESS THAT YOU HAVE BEEN MOVING AROUND A LOT MORE THAN USUAL: NOT AT ALL
10. IF YOU CHECKED OFF ANY PROBLEMS, HOW DIFFICULT HAVE THESE PROBLEMS MADE IT FOR YOU TO DO YOUR WORK, TAKE CARE OF THINGS AT HOME, OR GET ALONG WITH OTHER PEOPLE: SOMEWHAT DIFFICULT
9. THOUGHTS THAT YOU WOULD BE BETTER OFF DEAD, OR OF HURTING YOURSELF: NOT AT ALL
2. FEELING DOWN, DEPRESSED OR HOPELESS: NOT AT ALL
1. LITTLE INTEREST OR PLEASURE IN DOING THINGS: NOT AT ALL
8. MOVING OR SPEAKING SO SLOWLY THAT OTHER PEOPLE COULD HAVE NOTICED. OR THE OPPOSITE - BEING SO FIDGETY OR RESTLESS THAT YOU HAVE BEEN MOVING AROUND A LOT MORE THAN USUAL: NOT AT ALL
SUM OF ALL RESPONSES TO PHQ QUESTIONS 1-9: 4
4. FEELING TIRED OR HAVING LITTLE ENERGY: MORE THAN HALF THE DAYS
SUM OF ALL RESPONSES TO PHQ QUESTIONS 1-9: 4
7. TROUBLE CONCENTRATING ON THINGS, SUCH AS READING THE NEWSPAPER OR WATCHING TELEVISION: NOT AT ALL
3. TROUBLE FALLING OR STAYING ASLEEP: MORE THAN HALF THE DAYS
1. LITTLE INTEREST OR PLEASURE IN DOING THINGS: NOT AT ALL
SUM OF ALL RESPONSES TO PHQ QUESTIONS 1-9: 4
6. FEELING BAD ABOUT YOURSELF - OR THAT YOU ARE A FAILURE OR HAVE LET YOURSELF OR YOUR FAMILY DOWN: NOT AT ALL
7. TROUBLE CONCENTRATING ON THINGS, SUCH AS READING THE NEWSPAPER OR WATCHING TELEVISION: NOT AT ALL
5. POOR APPETITE OR OVEREATING: NOT AT ALL
SUM OF ALL RESPONSES TO PHQ QUESTIONS 1-9: 4
3. TROUBLE FALLING OR STAYING ASLEEP: MORE THAN HALF THE DAYS
2. FEELING DOWN, DEPRESSED OR HOPELESS: NOT AT ALL
SUM OF ALL RESPONSES TO PHQ QUESTIONS 1-9: 4
9. THOUGHTS THAT YOU WOULD BE BETTER OFF DEAD, OR OF HURTING YOURSELF: NOT AT ALL
5. POOR APPETITE OR OVEREATING: NOT AT ALL

## 2025-03-18 ENCOUNTER — OFFICE VISIT (OUTPATIENT)
Dept: FAMILY MEDICINE CLINIC | Age: 60
End: 2025-03-18

## 2025-03-18 VITALS
WEIGHT: 228 LBS | HEART RATE: 99 BPM | BODY MASS INDEX: 31.92 KG/M2 | DIASTOLIC BLOOD PRESSURE: 72 MMHG | HEIGHT: 71 IN | TEMPERATURE: 98.9 F | SYSTOLIC BLOOD PRESSURE: 122 MMHG | OXYGEN SATURATION: 97 %

## 2025-03-18 DIAGNOSIS — I10 ESSENTIAL HYPERTENSION: ICD-10-CM

## 2025-03-18 DIAGNOSIS — F33.1 MODERATE EPISODE OF RECURRENT MAJOR DEPRESSIVE DISORDER (HCC): ICD-10-CM

## 2025-03-18 DIAGNOSIS — R73.09 ELEVATED GLUCOSE: Primary | ICD-10-CM

## 2025-03-18 DIAGNOSIS — E55.9 VITAMIN D DEFICIENCY: ICD-10-CM

## 2025-03-18 DIAGNOSIS — R73.09 ELEVATED GLUCOSE: ICD-10-CM

## 2025-03-18 DIAGNOSIS — F51.01 PRIMARY INSOMNIA: ICD-10-CM

## 2025-03-18 RX ORDER — CHLORTHALIDONE 25 MG/1
12.5 TABLET ORAL DAILY
Qty: 15 TABLET | Refills: 2 | Status: SHIPPED | OUTPATIENT
Start: 2025-03-18

## 2025-03-18 RX ORDER — TESTOSTERONE ENANTHATE 75 MG/.5ML
INJECTION SUBCUTANEOUS WEEKLY
COMMUNITY
Start: 2025-03-03

## 2025-03-18 RX ORDER — ANASTROZOLE 1 MG/1
1 TABLET ORAL DAILY
COMMUNITY
Start: 2025-02-04

## 2025-03-18 NOTE — PROGRESS NOTES
well-nourished.   Eyes:    Conjunctivae are normal.    Pupils are equal, round, and reactive to light.    EOMI.   Neck:    Normal range of motion.    No thyromegaly or nodules noted.    No bruit. No LAD.  Cardiovascular:    Normal rate, regular rhythm and normal heart sounds.     No murmur. No gallop and no friction rub.   Pulmonary/Chest:    Effort normal and breath sounds normal.    No wheezes. No rales or rhonchi.  Abdominal:    Soft. Bowel sounds are normal.    No distension. No tenderness.   Musculoskeletal:    Normal range of motion.     No joint swelling noted.    No peripheral edema.  Skin:    Skin is warm and dry.    No rashes, lesions.  Psychiatric:    He has a normal mood and affect.    Normal groom and dress. No SI or HI.   ________________________________________________________    This note may have been created using dictation software. Efforts were made to reduce errors, but some may persist.   The patient (or guardian, if applicable) and other individuals in attendance with the patient were advised that Artificial Intelligence will be utilized during this visit to record, process the conversation to generate a clinical note, and support improvement of the AI technology. The patient (or guardian, if applicable) and other individuals in attendance at the appointment consented to the use of AI, including the recording.

## 2025-03-19 ENCOUNTER — RESULTS FOLLOW-UP (OUTPATIENT)
Dept: FAMILY MEDICINE CLINIC | Age: 60
End: 2025-03-19

## 2025-03-19 LAB
HBA1C MFR BLD: 6.6 % (ref 4–5.6)
VITAMIN D 25-HYDROXY: 62.1 NG/ML (ref 30–100)

## 2025-03-26 ENCOUNTER — HOSPITAL ENCOUNTER (OUTPATIENT)
Dept: HOSPITAL 83 - LAB | Age: 60
Discharge: HOME | End: 2025-03-26
Payer: COMMERCIAL

## 2025-03-26 DIAGNOSIS — E29.1: Primary | ICD-10-CM

## 2025-04-03 ENCOUNTER — PATIENT MESSAGE (OUTPATIENT)
Dept: FAMILY MEDICINE CLINIC | Age: 60
End: 2025-04-03

## 2025-04-03 RX ORDER — CHLORTHALIDONE 25 MG/1
25 TABLET ORAL DAILY
Qty: 30 TABLET | Refills: 2 | Status: SHIPPED | OUTPATIENT
Start: 2025-04-03

## 2025-04-29 ENCOUNTER — HOSPITAL ENCOUNTER (OUTPATIENT)
Dept: HOSPITAL 83 - LAB | Age: 60
Discharge: HOME | End: 2025-04-29
Attending: INTERNAL MEDICINE
Payer: COMMERCIAL

## 2025-04-29 DIAGNOSIS — E29.1: Primary | ICD-10-CM

## 2025-05-06 ENCOUNTER — HOSPITAL ENCOUNTER (OUTPATIENT)
Dept: HOSPITAL 83 - LAB | Age: 60
Discharge: HOME | End: 2025-05-06
Attending: INTERNAL MEDICINE
Payer: COMMERCIAL

## 2025-05-06 DIAGNOSIS — R73.09: ICD-10-CM

## 2025-05-06 DIAGNOSIS — E78.2: ICD-10-CM

## 2025-05-06 DIAGNOSIS — Z12.5: Primary | ICD-10-CM

## 2025-05-06 LAB
ALP SERPL-CCNC: 71 U/L (ref 46–116)
ALT SERPL W P-5'-P-CCNC: 34 U/L (ref 5–49)
BASOPHILS # BLD AUTO: 0.1 10*3/UL (ref 0–0.1)
BASOPHILS NFR BLD AUTO: 0.9 % (ref 0–1)
BUN SERPL-MCNC: 12 MG/DL (ref 9–23)
CHLORIDE SERPL-SCNC: 101 MMOL/L (ref 98–107)
CHOLEST SERPL-MCNC: 129 MG/DL (ref ?–200)
EOSINOPHIL # BLD AUTO: 0.2 10*3/UL (ref 0–0.4)
EOSINOPHIL # BLD AUTO: 2.6 % (ref 1–4)
ERYTHROCYTE [DISTWIDTH] IN BLOOD BY AUTOMATED COUNT: 14.6 % (ref 0–14.5)
HCT VFR BLD AUTO: 51.7 % (ref 42–52)
LDLC SERPL DIRECT ASSAY-MCNC: 47 MG/DL (ref 9–159)
MCHC RBC AUTO-ENTMCNC: 32.7 G/DL (ref 33–37)
MCV RBC AUTO: 94.9 FL (ref 80–94)
MONOCYTES # BLD AUTO: 0.7 10*3/UL (ref 0.1–1)
NEUT #: 4.4 10*3/UL (ref 2.3–7.9)
NEUTROPHILS NFR BLD AUTO: 58.7 % (ref 47–73)
PLATELET # BLD AUTO: 301 10*3/UL (ref 130–400)
PMV BLD AUTO: 9.6 FL (ref 9.6–12.3)
POTASSIUM SERPL-SCNC: 3.7 MMOL/L (ref 3.4–5.1)
PROT SERPL-MCNC: 7.4 GM/DL (ref 6–8)
RBC # BLD AUTO: 5.45 10*6/UL (ref 4.5–5.9)
TRIGL SERPL-MCNC: 167 MG/DL (ref ?–150)
WBC NRBC COR # BLD AUTO: 7.4 10*3/UL (ref 4.8–10.8)

## 2025-06-18 DIAGNOSIS — F51.01 PRIMARY INSOMNIA: ICD-10-CM

## 2025-06-19 NOTE — TELEPHONE ENCOUNTER
Name of Medication(s) Requested:  Requested Prescriptions     Pending Prescriptions Disp Refills    buPROPion (WELLBUTRIN SR) 150 MG extended release tablet 180 tablet 1     Sig: Take 1 tablet by mouth 2 times daily    cloNIDine (CATAPRES) 0.1 MG tablet 180 tablet 1     Sig: Take 1 tablet by mouth 2 times daily       Medication is on current medication list Yes    Dosage and directions were verified? Yes    Quantity verified: 90 day supply     Pharmacy Verified?  Yes    Last Appointment:  3/18/2025    Future appts:  Future Appointments   Date Time Provider Department Center   7/22/2025  1:00 PM Noah Pope MD [facility]        (If no appt send self scheduling link. .REFILLAPPT)  Scheduling request sent?     [] Yes  [x] No    Does patient need updated?  [] Yes  [x] No

## 2025-06-19 NOTE — TELEPHONE ENCOUNTER
Name of Medication(s) Requested:  Requested Prescriptions     Pending Prescriptions Disp Refills    clonazePAM (KLONOPIN) 1 MG tablet 30 tablet 2     Sig: Take 1 tablet by mouth nightly as needed (sleep) for up to 90 days.       Medication is on current medication list Yes    Dosage and directions were verified? Yes    Quantity verified: 30 day supply     Pharmacy Verified?  Yes    Last Appointment:  3/18/2025    Future appts:  Future Appointments   Date Time Provider Department Center   7/22/2025  1:00 PM Noah Pope MD [facility]        (If no appt send self scheduling link. .REFILLAPPT)  Scheduling request sent?     [] Yes  [x] No    Does patient need updated?  [] Yes  [x] No

## 2025-06-20 RX ORDER — CLONIDINE HYDROCHLORIDE 0.1 MG/1
0.1 TABLET ORAL 2 TIMES DAILY
Qty: 180 TABLET | Refills: 1 | Status: SHIPPED | OUTPATIENT
Start: 2025-06-20

## 2025-06-20 RX ORDER — BUPROPION HYDROCHLORIDE 150 MG/1
150 TABLET, EXTENDED RELEASE ORAL 2 TIMES DAILY
Qty: 180 TABLET | Refills: 1 | Status: SHIPPED | OUTPATIENT
Start: 2025-06-20

## 2025-06-20 RX ORDER — CLONAZEPAM 1 MG/1
1 TABLET ORAL NIGHTLY PRN
Qty: 30 TABLET | Refills: 2 | Status: SHIPPED | OUTPATIENT
Start: 2025-06-20 | End: 2025-09-18

## 2025-07-21 DIAGNOSIS — I10 ESSENTIAL HYPERTENSION: ICD-10-CM

## 2025-07-22 ENCOUNTER — OFFICE VISIT (OUTPATIENT)
Dept: FAMILY MEDICINE CLINIC | Age: 60
End: 2025-07-22

## 2025-07-22 VITALS
WEIGHT: 237 LBS | HEIGHT: 71 IN | SYSTOLIC BLOOD PRESSURE: 124 MMHG | TEMPERATURE: 98.7 F | OXYGEN SATURATION: 92 % | BODY MASS INDEX: 33.18 KG/M2 | DIASTOLIC BLOOD PRESSURE: 80 MMHG | HEART RATE: 83 BPM

## 2025-07-22 DIAGNOSIS — E55.9 VITAMIN D DEFICIENCY: ICD-10-CM

## 2025-07-22 DIAGNOSIS — F42.2 MIXED OBSESSIONAL THOUGHTS AND ACTS: ICD-10-CM

## 2025-07-22 DIAGNOSIS — G89.29 CHRONIC BILATERAL LOW BACK PAIN WITH LEFT-SIDED SCIATICA: ICD-10-CM

## 2025-07-22 DIAGNOSIS — M54.42 CHRONIC BILATERAL LOW BACK PAIN WITH LEFT-SIDED SCIATICA: ICD-10-CM

## 2025-07-22 DIAGNOSIS — R73.03 PREDIABETES: ICD-10-CM

## 2025-07-22 DIAGNOSIS — I10 ESSENTIAL HYPERTENSION: Primary | ICD-10-CM

## 2025-07-22 DIAGNOSIS — I10 ESSENTIAL HYPERTENSION: ICD-10-CM

## 2025-07-22 LAB
BASOPHILS ABSOLUTE: 0.07 K/UL (ref 0–0.2)
BASOPHILS RELATIVE PERCENT: 1 % (ref 0–2)
EOSINOPHILS ABSOLUTE: 0.23 K/UL (ref 0.05–0.5)
EOSINOPHILS RELATIVE PERCENT: 3 % (ref 0–6)
HCT VFR BLD CALC: 50.8 % (ref 37–54)
HEMOGLOBIN: 16.4 G/DL (ref 12.5–16.5)
IMMATURE GRANULOCYTES %: 1 % (ref 0–5)
IMMATURE GRANULOCYTES ABSOLUTE: 0.04 K/UL (ref 0–0.58)
LYMPHOCYTES ABSOLUTE: 1.51 K/UL (ref 1.5–4)
LYMPHOCYTES RELATIVE PERCENT: 22 % (ref 20–42)
MCH RBC QN AUTO: 31.2 PG (ref 26–35)
MCHC RBC AUTO-ENTMCNC: 32.3 G/DL (ref 32–34.5)
MCV RBC AUTO: 96.6 FL (ref 80–99.9)
MONOCYTES ABSOLUTE: 0.7 K/UL (ref 0.1–0.95)
MONOCYTES RELATIVE PERCENT: 10 % (ref 2–12)
NEUTROPHILS ABSOLUTE: 4.37 K/UL (ref 1.8–7.3)
NEUTROPHILS RELATIVE PERCENT: 63 % (ref 43–80)
PDW BLD-RTO: 14.7 % (ref 11.5–15)
PLATELET # BLD: 267 K/UL (ref 130–450)
PMV BLD AUTO: 10.5 FL (ref 7–12)
RBC # BLD: 5.26 M/UL (ref 3.8–5.8)
WBC # BLD: 6.9 K/UL (ref 4.5–11.5)

## 2025-07-22 RX ORDER — ATORVASTATIN CALCIUM 40 MG/1
40 TABLET, FILM COATED ORAL NIGHTLY
Qty: 90 TABLET | Refills: 1 | Status: SHIPPED | OUTPATIENT
Start: 2025-07-22

## 2025-07-22 RX ORDER — CLONIDINE HYDROCHLORIDE 0.1 MG/1
0.2 TABLET ORAL 2 TIMES DAILY
Qty: 180 TABLET | Refills: 1
Start: 2025-07-22

## 2025-07-22 RX ORDER — DILTIAZEM HYDROCHLORIDE 180 MG/1
CAPSULE, COATED, EXTENDED RELEASE ORAL
Qty: 180 CAPSULE | Refills: 1 | OUTPATIENT
Start: 2025-07-22

## 2025-07-22 RX ORDER — DILTIAZEM HYDROCHLORIDE 180 MG/1
180 CAPSULE, COATED, EXTENDED RELEASE ORAL 2 TIMES DAILY
Qty: 180 CAPSULE | Refills: 1 | Status: SHIPPED | OUTPATIENT
Start: 2025-07-22

## 2025-07-22 NOTE — PROGRESS NOTES
[facility]  Office Progress Note - Dr. Pope  7/22/25    CC:   Chief Complaint   Patient presents with    Lower Back Pain     X 2 months.  No known injury.        BP (!) 140/82   Pulse 83   Temp 98.7 °F (37.1 °C) (Temporal)   Ht 1.803 m (5' 11\")   Wt 107.5 kg (237 lb)   SpO2 92%   BMI 33.05 kg/m²   Wt Readings from Last 3 Encounters:   07/22/25 107.5 kg (237 lb)   03/18/25 103.4 kg (228 lb)   12/03/24 106.1 kg (234 lb)       HPI:   VIKY generated note:  History of Present Illness  The patient presents for evaluation of hypertension, diabetes, and back pain.    He has been managing his blood pressure with clonidine 1.5 mg twice daily for about a month, which has been effective in maintaining his blood pressure around 140/80. He continues diltiazem as well. He is requesting a refill of clonidine. He reports no depression but continues to experience sleep disturbances. He has not tried the clonidine patch. He has previously used a testosterone patch but found it difficult to adhere due to his oily skin. He showers twice daily and applies sunscreen regularly. He has been monitoring his blood pressure at home and notes that it occasionally drops below 140/80. He also mentions that he did not bring his blood pressure monitor on his recent vacation. He had previously tried a higher dose of chlorthalidone but experienced side effects such as headaches and vomiting, which subsided upon discontinuation of the medication.    His A1c level was initially 6.6 but decreased to 6.0 after 7 weeks. He has been diligent in managing his diabetes, except for the last 2 weeks during his vacation. He has noticed that his weight remains stable despite dietary changes, and he suspects this may be due to his estrogen medication. He has been checking his blood sugar levels every few days, which have been consistently between 110 and 120. He has also noticed that his blood sugar levels return to normal within 3

## 2025-07-23 LAB
ALBUMIN: 4.1 G/DL (ref 3.5–5.2)
ALP BLD-CCNC: 70 U/L (ref 40–129)
ALT SERPL-CCNC: 34 U/L (ref 0–50)
ANION GAP SERPL CALCULATED.3IONS-SCNC: 12 MMOL/L (ref 7–16)
AST SERPL-CCNC: 30 U/L (ref 0–50)
BILIRUB SERPL-MCNC: 0.2 MG/DL (ref 0–1.2)
BUN BLDV-MCNC: 15 MG/DL (ref 6–20)
CALCIUM SERPL-MCNC: 9.4 MG/DL (ref 8.6–10)
CHLORIDE BLD-SCNC: 108 MMOL/L (ref 98–107)
CHOLESTEROL, TOTAL: 138 MG/DL
CO2: 21 MMOL/L (ref 22–29)
CREAT SERPL-MCNC: 0.8 MG/DL (ref 0.7–1.2)
GFR, ESTIMATED: >90 ML/MIN/1.73M2
GLUCOSE BLD-MCNC: 111 MG/DL (ref 74–99)
HDLC SERPL-MCNC: 48 MG/DL
LDL CHOLESTEROL: 38 MG/DL
POTASSIUM SERPL-SCNC: 4.6 MMOL/L (ref 3.5–5.1)
SODIUM BLD-SCNC: 141 MMOL/L (ref 136–145)
TOTAL PROTEIN: 7 G/DL (ref 6.4–8.3)
TRIGL SERPL-MCNC: 260 MG/DL
VITAMIN D 25-HYDROXY: 41.9 NG/ML (ref 30–100)
VLDLC SERPL CALC-MCNC: 52 MG/DL

## 2025-08-26 RX ORDER — CLONIDINE HYDROCHLORIDE 0.1 MG/1
0.15 TABLET ORAL 2 TIMES DAILY
Qty: 270 TABLET | Refills: 1 | Status: SHIPPED | OUTPATIENT
Start: 2025-08-26

## (undated) DEVICE — GRADUATE TRIANG MEASURE 1000ML BLK PRNT